# Patient Record
Sex: MALE | Race: WHITE | NOT HISPANIC OR LATINO | Employment: FULL TIME | URBAN - METROPOLITAN AREA
[De-identification: names, ages, dates, MRNs, and addresses within clinical notes are randomized per-mention and may not be internally consistent; named-entity substitution may affect disease eponyms.]

---

## 2017-01-03 ENCOUNTER — ALLSCRIPTS OFFICE VISIT (OUTPATIENT)
Dept: OTHER | Facility: OTHER | Age: 37
End: 2017-01-03

## 2017-01-09 ENCOUNTER — ANESTHESIA EVENT (OUTPATIENT)
Dept: PERIOP | Facility: HOSPITAL | Age: 37
End: 2017-01-09
Payer: COMMERCIAL

## 2017-01-09 ENCOUNTER — HOSPITAL ENCOUNTER (OUTPATIENT)
Facility: HOSPITAL | Age: 37
Setting detail: OUTPATIENT SURGERY
Discharge: HOME | End: 2017-01-09
Attending: SURGERY | Admitting: SURGERY
Payer: COMMERCIAL

## 2017-01-09 ENCOUNTER — ANESTHESIA (OUTPATIENT)
Dept: PERIOP | Facility: HOSPITAL | Age: 37
End: 2017-01-09
Payer: COMMERCIAL

## 2017-01-09 VITALS
BODY MASS INDEX: 34.22 KG/M2 | TEMPERATURE: 98.4 F | RESPIRATION RATE: 18 BRPM | DIASTOLIC BLOOD PRESSURE: 74 MMHG | OXYGEN SATURATION: 99 % | WEIGHT: 239 LBS | HEART RATE: 103 BPM | SYSTOLIC BLOOD PRESSURE: 120 MMHG | HEIGHT: 70 IN

## 2017-01-09 DIAGNOSIS — K80.20 CALCULUS OF GALLBLADDER WITHOUT CHOLECYSTITIS WITHOUT OBSTRUCTION: ICD-10-CM

## 2017-01-09 LAB
ALBUMIN SERPL BCP-MCNC: 4.4 G/DL (ref 3.5–5)
ALP SERPL-CCNC: 96 U/L (ref 46–116)
ALT SERPL W P-5'-P-CCNC: 50 U/L (ref 12–78)
AST SERPL W P-5'-P-CCNC: 52 U/L (ref 5–45)
BILIRUB DIRECT SERPL-MCNC: 0.09 MG/DL (ref 0–0.2)
BILIRUB SERPL-MCNC: 0.82 MG/DL (ref 0.2–1)
PROT SERPL-MCNC: 8.1 G/DL (ref 6.4–8.2)

## 2017-01-09 PROCEDURE — 88304 TISSUE EXAM BY PATHOLOGIST: CPT | Performed by: SURGERY

## 2017-01-09 PROCEDURE — 80076 HEPATIC FUNCTION PANEL: CPT | Performed by: SURGERY

## 2017-01-09 RX ORDER — FENTANYL CITRATE 50 UG/ML
INJECTION, SOLUTION INTRAMUSCULAR; INTRAVENOUS AS NEEDED
Status: DISCONTINUED | OUTPATIENT
Start: 2017-01-09 | End: 2017-01-09 | Stop reason: SURG

## 2017-01-09 RX ORDER — LIDOCAINE HYDROCHLORIDE 20 MG/ML
INJECTION, SOLUTION EPIDURAL; INFILTRATION; INTRACAUDAL; PERINEURAL AS NEEDED
Status: DISCONTINUED | OUTPATIENT
Start: 2017-01-09 | End: 2017-01-09 | Stop reason: SURG

## 2017-01-09 RX ORDER — ROCURONIUM BROMIDE 10 MG/ML
INJECTION, SOLUTION INTRAVENOUS AS NEEDED
Status: DISCONTINUED | OUTPATIENT
Start: 2017-01-09 | End: 2017-01-09 | Stop reason: SURG

## 2017-01-09 RX ORDER — MORPHINE SULFATE 2 MG/ML
2 INJECTION, SOLUTION INTRAMUSCULAR; INTRAVENOUS
Status: DISCONTINUED | OUTPATIENT
Start: 2017-01-09 | End: 2017-01-09 | Stop reason: HOSPADM

## 2017-01-09 RX ORDER — PROPOFOL 10 MG/ML
INJECTION, EMULSION INTRAVENOUS AS NEEDED
Status: DISCONTINUED | OUTPATIENT
Start: 2017-01-09 | End: 2017-01-09 | Stop reason: SURG

## 2017-01-09 RX ORDER — SODIUM CHLORIDE, SODIUM LACTATE, POTASSIUM CHLORIDE, CALCIUM CHLORIDE 600; 310; 30; 20 MG/100ML; MG/100ML; MG/100ML; MG/100ML
125 INJECTION, SOLUTION INTRAVENOUS CONTINUOUS
Status: DISCONTINUED | OUTPATIENT
Start: 2017-01-09 | End: 2017-01-09 | Stop reason: HOSPADM

## 2017-01-09 RX ORDER — MEPERIDINE HYDROCHLORIDE 50 MG/ML
12.5 INJECTION INTRAMUSCULAR; INTRAVENOUS; SUBCUTANEOUS AS NEEDED
Status: CANCELLED | OUTPATIENT
Start: 2017-01-09

## 2017-01-09 RX ORDER — MAGNESIUM HYDROXIDE 1200 MG/15ML
LIQUID ORAL AS NEEDED
Status: DISCONTINUED | OUTPATIENT
Start: 2017-01-09 | End: 2017-01-09 | Stop reason: HOSPADM

## 2017-01-09 RX ORDER — KETOROLAC TROMETHAMINE 30 MG/ML
INJECTION, SOLUTION INTRAMUSCULAR; INTRAVENOUS AS NEEDED
Status: DISCONTINUED | OUTPATIENT
Start: 2017-01-09 | End: 2017-01-09 | Stop reason: SURG

## 2017-01-09 RX ORDER — MIDAZOLAM HYDROCHLORIDE 1 MG/ML
INJECTION INTRAMUSCULAR; INTRAVENOUS AS NEEDED
Status: DISCONTINUED | OUTPATIENT
Start: 2017-01-09 | End: 2017-01-09 | Stop reason: SURG

## 2017-01-09 RX ORDER — FENTANYL CITRATE/PF 50 MCG/ML
50 SYRINGE (ML) INJECTION
Status: DISCONTINUED | OUTPATIENT
Start: 2017-01-09 | End: 2017-01-09 | Stop reason: HOSPADM

## 2017-01-09 RX ORDER — BUPIVACAINE HYDROCHLORIDE AND EPINEPHRINE 2.5; 5 MG/ML; UG/ML
INJECTION, SOLUTION EPIDURAL; INFILTRATION; INTRACAUDAL; PERINEURAL AS NEEDED
Status: DISCONTINUED | OUTPATIENT
Start: 2017-01-09 | End: 2017-01-09 | Stop reason: HOSPADM

## 2017-01-09 RX ORDER — SODIUM CHLORIDE 9 MG/ML
125 INJECTION, SOLUTION INTRAVENOUS CONTINUOUS
Status: DISCONTINUED | OUTPATIENT
Start: 2017-01-09 | End: 2017-01-09 | Stop reason: HOSPADM

## 2017-01-09 RX ORDER — FENTANYL CITRATE/PF 50 MCG/ML
50 SYRINGE (ML) INJECTION
Status: CANCELLED | OUTPATIENT
Start: 2017-01-09

## 2017-01-09 RX ORDER — HYDROCODONE BITARTRATE AND ACETAMINOPHEN 5; 325 MG/1; MG/1
2 TABLET ORAL EVERY 6 HOURS PRN
Status: DISCONTINUED | OUTPATIENT
Start: 2017-01-09 | End: 2017-01-09 | Stop reason: HOSPADM

## 2017-01-09 RX ORDER — HYDROCODONE BITARTRATE AND ACETAMINOPHEN 5; 325 MG/1; MG/1
1-2 TABLET ORAL EVERY 6 HOURS PRN
Qty: 40 TABLET | Refills: 0 | Status: SHIPPED | OUTPATIENT
Start: 2017-01-09 | End: 2017-01-19

## 2017-01-09 RX ORDER — ONDANSETRON 2 MG/ML
INJECTION INTRAMUSCULAR; INTRAVENOUS AS NEEDED
Status: DISCONTINUED | OUTPATIENT
Start: 2017-01-09 | End: 2017-01-09 | Stop reason: SURG

## 2017-01-09 RX ORDER — MEPERIDINE HYDROCHLORIDE 50 MG/ML
12.5 INJECTION INTRAMUSCULAR; INTRAVENOUS; SUBCUTANEOUS AS NEEDED
Status: DISCONTINUED | OUTPATIENT
Start: 2017-01-09 | End: 2017-01-09 | Stop reason: HOSPADM

## 2017-01-09 RX ORDER — GLYCOPYRROLATE 0.2 MG/ML
INJECTION INTRAMUSCULAR; INTRAVENOUS AS NEEDED
Status: DISCONTINUED | OUTPATIENT
Start: 2017-01-09 | End: 2017-01-09 | Stop reason: SURG

## 2017-01-09 RX ORDER — HYDROCODONE BITARTRATE AND ACETAMINOPHEN 5; 325 MG/1; MG/1
1 TABLET ORAL EVERY 4 HOURS PRN
Status: DISCONTINUED | OUTPATIENT
Start: 2017-01-09 | End: 2017-01-09 | Stop reason: HOSPADM

## 2017-01-09 RX ADMIN — FENTANYL CITRATE 50 MCG: 50 INJECTION, SOLUTION INTRAMUSCULAR; INTRAVENOUS at 09:10

## 2017-01-09 RX ADMIN — HYDROCODONE BITARTRATE AND ACETAMINOPHEN 1 TABLET: 5; 325 TABLET ORAL at 13:37

## 2017-01-09 RX ADMIN — LIDOCAINE HYDROCHLORIDE 100 MG: 20 INJECTION, SOLUTION EPIDURAL; INFILTRATION; INTRACAUDAL; PERINEURAL at 07:36

## 2017-01-09 RX ADMIN — SODIUM CHLORIDE 125 ML/HR: 0.9 INJECTION, SOLUTION INTRAVENOUS at 12:15

## 2017-01-09 RX ADMIN — FENTANYL CITRATE 50 MCG: 50 INJECTION, SOLUTION INTRAMUSCULAR; INTRAVENOUS at 08:34

## 2017-01-09 RX ADMIN — PROPOFOL 200 MG: 10 INJECTION, EMULSION INTRAVENOUS at 07:36

## 2017-01-09 RX ADMIN — METRONIDAZOLE 500 MG: 500 INJECTION, SOLUTION INTRAVENOUS at 07:46

## 2017-01-09 RX ADMIN — MIDAZOLAM HYDROCHLORIDE 2 MG: 1 INJECTION, SOLUTION INTRAMUSCULAR; INTRAVENOUS at 07:27

## 2017-01-09 RX ADMIN — DEXAMETHASONE SODIUM PHOSPHATE 4 MG: 10 INJECTION INTRAMUSCULAR; INTRAVENOUS at 07:50

## 2017-01-09 RX ADMIN — KETOROLAC TROMETHAMINE 30 MG: 30 INJECTION, SOLUTION INTRAMUSCULAR at 08:50

## 2017-01-09 RX ADMIN — CEFAZOLIN SODIUM 2000 MG: 2 SOLUTION INTRAVENOUS at 07:41

## 2017-01-09 RX ADMIN — ONDANSETRON HYDROCHLORIDE 4 MG: 2 INJECTION, SOLUTION INTRAVENOUS at 07:50

## 2017-01-09 RX ADMIN — HYDROCODONE BITARTRATE AND ACETAMINOPHEN 1 TABLET: 5; 325 TABLET ORAL at 14:13

## 2017-01-09 RX ADMIN — ROCURONIUM BROMIDE 40 MG: 10 INJECTION, SOLUTION INTRAVENOUS at 07:36

## 2017-01-09 RX ADMIN — NEOSTIGMINE METHYLSULFATE 2.5 MG: 1 INJECTION INTRAMUSCULAR; INTRAVENOUS; SUBCUTANEOUS at 08:51

## 2017-01-09 RX ADMIN — FENTANYL CITRATE 150 MCG: 50 INJECTION, SOLUTION INTRAMUSCULAR; INTRAVENOUS at 07:36

## 2017-01-09 RX ADMIN — SODIUM CHLORIDE 125 ML/HR: 0.9 INJECTION, SOLUTION INTRAVENOUS at 06:43

## 2017-01-09 RX ADMIN — FENTANYL CITRATE 100 MCG: 50 INJECTION, SOLUTION INTRAMUSCULAR; INTRAVENOUS at 07:28

## 2017-01-09 RX ADMIN — GLYCOPYRROLATE 0.4 MG: 0.2 INJECTION, SOLUTION INTRAMUSCULAR; INTRAVENOUS at 08:51

## 2017-01-24 ENCOUNTER — ALLSCRIPTS OFFICE VISIT (OUTPATIENT)
Dept: OTHER | Facility: OTHER | Age: 37
End: 2017-01-24

## 2017-05-31 ENCOUNTER — ALLSCRIPTS OFFICE VISIT (OUTPATIENT)
Dept: OTHER | Facility: OTHER | Age: 37
End: 2017-05-31

## 2018-01-12 VITALS
DIASTOLIC BLOOD PRESSURE: 76 MMHG | HEART RATE: 76 BPM | TEMPERATURE: 98.3 F | BODY MASS INDEX: 34.22 KG/M2 | HEIGHT: 70 IN | SYSTOLIC BLOOD PRESSURE: 120 MMHG | RESPIRATION RATE: 16 BRPM | WEIGHT: 239.04 LBS

## 2018-01-12 VITALS
BODY MASS INDEX: 33.79 KG/M2 | HEIGHT: 70 IN | HEART RATE: 76 BPM | SYSTOLIC BLOOD PRESSURE: 120 MMHG | RESPIRATION RATE: 16 BRPM | DIASTOLIC BLOOD PRESSURE: 76 MMHG | WEIGHT: 236 LBS | TEMPERATURE: 97.5 F

## 2018-01-12 VITALS
SYSTOLIC BLOOD PRESSURE: 118 MMHG | RESPIRATION RATE: 18 BRPM | BODY MASS INDEX: 32.69 KG/M2 | DIASTOLIC BLOOD PRESSURE: 84 MMHG | HEART RATE: 72 BPM | WEIGHT: 229.8 LBS

## 2018-03-09 ENCOUNTER — OFFICE VISIT (OUTPATIENT)
Dept: FAMILY MEDICINE CLINIC | Facility: CLINIC | Age: 38
End: 2018-03-09
Payer: COMMERCIAL

## 2018-03-09 VITALS
BODY MASS INDEX: 30.78 KG/M2 | HEIGHT: 71 IN | RESPIRATION RATE: 18 BRPM | SYSTOLIC BLOOD PRESSURE: 120 MMHG | DIASTOLIC BLOOD PRESSURE: 86 MMHG | WEIGHT: 219.9 LBS | HEART RATE: 72 BPM

## 2018-03-09 DIAGNOSIS — R07.9 CHEST PAIN, UNSPECIFIED TYPE: Primary | ICD-10-CM

## 2018-03-09 PROCEDURE — 93000 ELECTROCARDIOGRAM COMPLETE: CPT | Performed by: PHYSICIAN ASSISTANT

## 2018-03-09 PROCEDURE — 99214 OFFICE O/P EST MOD 30 MIN: CPT | Performed by: PHYSICIAN ASSISTANT

## 2018-03-09 NOTE — PROGRESS NOTES
Assessment/Plan:    1  Chest pain, unspecified type    - EKG normal, will order CXR, get labs done  Most likely muscular in nature  Will call with results  - POCT ECG - NSR no change from previous  - Lipid Panel with Direct LDL reflex; Future  - Comprehensive metabolic panel; Future  - CBC and differential; Future  - TSH, 3rd generation with T4 reflex; Future  - XR chest pa & lateral; Future      F/u as needed  F/u physical at age 34      Subjective:   Chief Complaint   Patient presents with    Chest Pain     since mid January occuring more frequently      Patient ID: Scar Ojeda II is a 42 y o  male  Patient had a history of atypical chest pain, would come and go and then had cholecystectomy and symptoms resolved for over a year  Then January they returned  Happens every 3rd day, now happens daily at least once sometimes twice  Feels like a pressing feeling dull pain, lasts about 5-10 minutes  No relationship to food, activity  Can happen at rest, home, work  Random  Has recently taken up volleyball and has no pain with playing and no pain after, no pain after eating like before  Denies shortness of breath, radiation, cough  Tried Prilosec for two weeks with no change at all  Has lost 40 lbs in past two years by watching portion size and playing volleyball  Dad smoker in house, patient never smoked  Dad had multiple MI, paternal grandfather MI          The following portions of the patient's history were reviewed and updated as appropriate: allergies, current medications, past family history, past medical history, past social history, past surgical history and problem list     Past Medical History:   Diagnosis Date    Cholelithiasis     GERD (gastroesophageal reflux disease)     Herniation of intervertebral disc of lumbar spine     IBS (irritable bowel syndrome)      Past Surgical History:   Procedure Laterality Date    COLONOSCOPY      [place] LAP,CHOLECYSTECTOMY N/A 1/9/2017 Procedure: CHOLECYSTECTOMY LAPAROSCOPIC;  Surgeon: Tiffani Graham MD;  Location: [facility] OR;  Service: General    WISDOM TOOTH EXTRACTION       Family History   Problem Relation Age of Onset    Nephrolithiasis Mother     Heart attack Father     Coronary artery disease Maternal Grandfather      Social History     Social History    Marital status: Single     Spouse name: N/A    Number of children: N/A    Years of education: N/A     Occupational History    Not on file  Social History Main Topics    Smoking status: Never Smoker    Smokeless tobacco: Never Used    Alcohol use No    Drug use: No    Sexual activity: Not on file     Other Topics Concern    Not on file     Social History Narrative    No narrative on file     No current outpatient prescriptions on file  Review of Systems          Objective:    Vitals:    03/09/18 1303   BP: 120/86   BP Location: Left arm   Patient Position: Sitting   Cuff Size: Adult   Pulse: 72   Resp: 18   Weight: 99 7 kg (219 lb 14 4 oz)   Height: 5' 10 5" (1 791 m)        Physical Exam   Constitutional: He is oriented to person, place, and time  He appears well-developed and well-nourished  Neck: Neck supple  Cardiovascular: Normal rate, regular rhythm and normal heart sounds  Pulmonary/Chest: Effort normal and breath sounds normal    Chest wall nontender in perceived areas   Neurological: He is alert and oriented to person, place, and time  Skin: Skin is warm  Psychiatric: He has a normal mood and affect

## 2018-03-12 ENCOUNTER — LAB (OUTPATIENT)
Dept: LAB | Facility: CLINIC | Age: 38
End: 2018-03-12
Payer: COMMERCIAL

## 2018-03-12 DIAGNOSIS — R07.9 CHEST PAIN, UNSPECIFIED TYPE: ICD-10-CM

## 2018-03-12 LAB
ALBUMIN SERPL BCP-MCNC: 4.3 G/DL (ref 3.5–5)
ALP SERPL-CCNC: 99 U/L (ref 46–116)
ALT SERPL W P-5'-P-CCNC: 66 U/L (ref 12–78)
ANION GAP SERPL CALCULATED.3IONS-SCNC: 8 MMOL/L (ref 4–13)
AST SERPL W P-5'-P-CCNC: 24 U/L (ref 5–45)
BASOPHILS # BLD AUTO: 0.03 THOUSANDS/ΜL (ref 0–0.1)
BASOPHILS NFR BLD AUTO: 1 % (ref 0–1)
BILIRUB SERPL-MCNC: 1.2 MG/DL (ref 0.2–1)
BUN SERPL-MCNC: 13 MG/DL (ref 5–25)
CALCIUM SERPL-MCNC: 8.9 MG/DL (ref 8.3–10.1)
CHLORIDE SERPL-SCNC: 104 MMOL/L (ref 100–108)
CHOLEST SERPL-MCNC: 181 MG/DL (ref 50–200)
CO2 SERPL-SCNC: 28 MMOL/L (ref 21–32)
CREAT SERPL-MCNC: 1.07 MG/DL (ref 0.6–1.3)
EOSINOPHIL # BLD AUTO: 0.09 THOUSAND/ΜL (ref 0–0.61)
EOSINOPHIL NFR BLD AUTO: 2 % (ref 0–6)
ERYTHROCYTE [DISTWIDTH] IN BLOOD BY AUTOMATED COUNT: 13.5 % (ref 11.6–15.1)
GFR SERPL CREATININE-BSD FRML MDRD: 87 ML/MIN/1.73SQ M
GLUCOSE P FAST SERPL-MCNC: 85 MG/DL (ref 65–99)
HCT VFR BLD AUTO: 45.8 % (ref 36.5–49.3)
HDLC SERPL-MCNC: 47 MG/DL (ref 40–60)
HGB BLD-MCNC: 15.9 G/DL (ref 12–17)
LDLC SERPL CALC-MCNC: 115 MG/DL (ref 0–100)
LYMPHOCYTES # BLD AUTO: 1.52 THOUSANDS/ΜL (ref 0.6–4.47)
LYMPHOCYTES NFR BLD AUTO: 26 % (ref 14–44)
MCH RBC QN AUTO: 29.8 PG (ref 26.8–34.3)
MCHC RBC AUTO-ENTMCNC: 34.7 G/DL (ref 31.4–37.4)
MCV RBC AUTO: 86 FL (ref 82–98)
MONOCYTES # BLD AUTO: 0.43 THOUSAND/ΜL (ref 0.17–1.22)
MONOCYTES NFR BLD AUTO: 7 % (ref 4–12)
NEUTROPHILS # BLD AUTO: 3.7 THOUSANDS/ΜL (ref 1.85–7.62)
NEUTS SEG NFR BLD AUTO: 64 % (ref 43–75)
NRBC BLD AUTO-RTO: 0 /100 WBCS
PLATELET # BLD AUTO: 290 THOUSANDS/UL (ref 149–390)
PMV BLD AUTO: 10.9 FL (ref 8.9–12.7)
POTASSIUM SERPL-SCNC: 4.4 MMOL/L (ref 3.5–5.3)
PROT SERPL-MCNC: 7.5 G/DL (ref 6.4–8.2)
RBC # BLD AUTO: 5.33 MILLION/UL (ref 3.88–5.62)
SODIUM SERPL-SCNC: 140 MMOL/L (ref 136–145)
TRIGL SERPL-MCNC: 97 MG/DL
TSH SERPL DL<=0.05 MIU/L-ACNC: 3.35 UIU/ML (ref 0.36–3.74)
WBC # BLD AUTO: 5.78 THOUSAND/UL (ref 4.31–10.16)

## 2018-03-12 PROCEDURE — 84443 ASSAY THYROID STIM HORMONE: CPT

## 2018-03-12 PROCEDURE — 80061 LIPID PANEL: CPT

## 2018-03-12 PROCEDURE — 80053 COMPREHEN METABOLIC PANEL: CPT

## 2018-03-12 PROCEDURE — 36415 COLL VENOUS BLD VENIPUNCTURE: CPT

## 2018-03-12 PROCEDURE — 85025 COMPLETE CBC W/AUTO DIFF WBC: CPT

## 2018-03-14 ENCOUNTER — APPOINTMENT (OUTPATIENT)
Dept: RADIOLOGY | Facility: CLINIC | Age: 38
End: 2018-03-14
Payer: COMMERCIAL

## 2018-03-14 DIAGNOSIS — R07.9 CHEST PAIN, UNSPECIFIED TYPE: ICD-10-CM

## 2018-03-14 PROCEDURE — 71046 X-RAY EXAM CHEST 2 VIEWS: CPT

## 2018-08-22 ENCOUNTER — TRANSCRIBE ORDERS (OUTPATIENT)
Dept: ADMINISTRATIVE | Facility: HOSPITAL | Age: 38
End: 2018-08-22

## 2018-08-22 ENCOUNTER — OFFICE VISIT (OUTPATIENT)
Dept: CARDIOLOGY CLINIC | Facility: CLINIC | Age: 38
End: 2018-08-22
Payer: COMMERCIAL

## 2018-08-22 VITALS
DIASTOLIC BLOOD PRESSURE: 86 MMHG | HEART RATE: 86 BPM | SYSTOLIC BLOOD PRESSURE: 130 MMHG | HEIGHT: 72 IN | WEIGHT: 223.8 LBS | BODY MASS INDEX: 30.31 KG/M2

## 2018-08-22 DIAGNOSIS — R07.9 CHEST PAIN, UNSPECIFIED TYPE: Primary | ICD-10-CM

## 2018-08-22 PROCEDURE — 93000 ELECTROCARDIOGRAM COMPLETE: CPT | Performed by: INTERNAL MEDICINE

## 2018-08-22 PROCEDURE — 99243 OFF/OP CNSLTJ NEW/EST LOW 30: CPT | Performed by: INTERNAL MEDICINE

## 2018-08-22 NOTE — PROGRESS NOTES
Cardiology Consultation     Moni Mccord  [id number]  1980  HEART & VASCULAR  [facility] Emperatriz Arredondo  [address] [place] [address]  1  Chest pain, unspecified type       Patient Active Problem List   Diagnosis    Acid reflux    Irritable bowel syndrome       HPI patient is here for a cardiology evaluation  He has been having issues with dull chest discomfort  There is a family history of coronary disease  His father has had 2 heart attacks and  after his third heart attack at the age of 71  He had also had a stroke  His paternal grandfather had a heart attack and  after his second at the age of 67  Megha Madden His father was a remote tobacco user  Patient had a lipid profile in March which demonstrated a total cholesterol of 181 with an HDL of 47 and an LDL of 115  The patient's EKG today demonstrates sinus rhythm and is normal   His blood pressure is 130/86 with a pulse of 86  Patient has been having intermittent left upper pectoral discomfort  It occurs randomly  It occurs on a daily basis  It is not related to anything in particular  He is here for device in reference to further management  He does recall having a cardiac workup about three years ago and eventually was found to have gallbladder disease but was not having this type of discomfort at that time  PMH-  Past Medical History:   Diagnosis Date    Cholelithiasis     GERD (gastroesophageal reflux disease)     Herniation of intervertebral disc of lumbar spine     IBS (irritable bowel syndrome)         SOCIAL HISTORY-  Social History     Social History    Marital status: Single     Spouse name: N/A    Number of children: N/A    Years of education: N/A     Occupational History    Not on file       Social History Main Topics    Smoking status: Never Smoker    Smokeless tobacco: Never Used    Alcohol use No    Drug use: No    Sexual activity: Not on file Other Topics Concern    Not on file     Social History Narrative    No narrative on file        FAMILY HISTORY-  Family History   Problem Relation Age of Onset    Nephrolithiasis Mother     Heart attack Father     Coronary artery disease Maternal Grandfather        SURGICAL HISTORY-  Past Surgical History:   Procedure Laterality Date    COLONOSCOPY      [place] LAP,CHOLECYSTECTOMY N/A 1/9/2017    Procedure: CHOLECYSTECTOMY LAPAROSCOPIC;  Surgeon: Deandra Crocker MD;  Location: [facility] OR;  Service: General    WISDOM TOOTH EXTRACTION         No current outpatient prescriptions on file  Allergies   Allergen Reactions    Other      Annotation - [id number]: poison ivy/oak     Vitals:    08/22/18 1431   Weight: 102 kg (223 lb 12 8 oz)   Height: 6' (1 829 m)         Review of Systems:  Review of Systems   Cardiovascular: Positive for chest pain  All other systems reviewed and are negative  Physical Exam:  Physical Exam   Constitutional: He is oriented to person, place, and time  He appears well-developed and well-nourished  HENT:   Head: Normocephalic and atraumatic  Eyes: Conjunctivae are normal  Pupils are equal, round, and reactive to light  Neck: Normal range of motion  Neck supple  Cardiovascular: Normal rate and normal heart sounds  Pulmonary/Chest: Effort normal and breath sounds normal    Neurological: He is alert and oriented to person, place, and time  Skin: Skin is warm and dry  Psychiatric: He has a normal mood and affect  Vitals reviewed  Discussion/Summary:  I will schedule a standard treadmill test and an echocardiogram   I have asked the patient to call in the interim if there is a problem otherwise I will see him in follow-up in six months time

## 2018-08-22 NOTE — PATIENT INSTRUCTIONS
I will check a stress test and echocardiogram   Please call if there is a problem in the interim otherwise I will see you at the follow-up visit

## 2018-12-05 ENCOUNTER — HOSPITAL ENCOUNTER (OUTPATIENT)
Dept: NON INVASIVE DIAGNOSTICS | Facility: CLINIC | Age: 38
Discharge: HOME | End: 2018-12-05
Payer: COMMERCIAL

## 2018-12-05 DIAGNOSIS — R07.9 CHEST PAIN, UNSPECIFIED TYPE: ICD-10-CM

## 2018-12-05 PROCEDURE — 93306 TTE W/DOPPLER COMPLETE: CPT

## 2018-12-05 PROCEDURE — 93306 TTE W/DOPPLER COMPLETE: CPT | Performed by: INTERNAL MEDICINE

## 2018-12-05 PROCEDURE — 93018 CV STRESS TEST I&R ONLY: CPT | Performed by: INTERNAL MEDICINE

## 2018-12-05 PROCEDURE — 93016 CV STRESS TEST SUPVJ ONLY: CPT | Performed by: INTERNAL MEDICINE

## 2018-12-05 PROCEDURE — 93017 CV STRESS TEST TRACING ONLY: CPT

## 2018-12-13 LAB
CHEST PAIN STATEMENT: NORMAL
MAX DIASTOLIC BP: 80 MMHG
MAX HEART RATE: 184 BPM
MAX PREDICTED HEART RATE: 180 BPM
MAX. SYSTOLIC BP: 190 MMHG
PROTOCOL NAME: NORMAL
REASON FOR TERMINATION: NORMAL
TARGET HR FORMULA: NORMAL
TEST INDICATION: NORMAL
TIME IN EXERCISE PHASE: NORMAL

## 2019-02-09 NOTE — PROGRESS NOTES
Cardiology Follow Up    Garett Letters  1980  [id number]  [address] [facility] [place] [address]  [phone]    1  Chest pain, unspecified type         Interval History:  Patient is here for a follow-up visit  He was most recently seen by me in August of last year  Since that visit with me he had an exercise treadmill test done December 6th which showed no evidence of prognostically important ischemia  He exercised for 10 minutes and 35 seconds  An echocardiogram demonstrated preserved LV systolic function and no significant valvular heart disease  He had a family history of coronary disease and at that time was having atypical chest discomfort  He has been fine  He has had no chest pain or significant dyspnea      Patient Active Problem List   Diagnosis    Acid reflux    Irritable bowel syndrome     Past Medical History:   Diagnosis Date    Cholelithiasis     GERD (gastroesophageal reflux disease)     Herniation of intervertebral disc of lumbar spine     IBS (irritable bowel syndrome)      Social History     Socioeconomic History    Marital status: Single     Spouse name: Not on file    Number of children: Not on file    Years of education: Not on file    Highest education level: Not on file   Occupational History    Not on file   Social Needs    Financial resource strain: Not on file    Food insecurity:     Worry: Not on file     Inability: Not on file    Transportation needs:     Medical: Not on file     Non-medical: Not on file   Tobacco Use    Smoking status: Never Smoker    Smokeless tobacco: Never Used   Substance and Sexual Activity    Alcohol use: No    Drug use: No    Sexual activity: Not on file   Lifestyle    Physical activity:     Days per week: Not on file     Minutes per session: Not on file    Stress: Not on file   Relationships    Social connections: Talks on phone: Not on file     Gets together: Not on file     Attends [employer] service: Not on file     Active member of club or organization: Not on file     Attends meetings of clubs or organizations: Not on file     Relationship status: Not on file    Intimate partner violence:     Fear of current or ex partner: Not on file     Emotionally abused: Not on file     Physically abused: Not on file     Forced sexual activity: Not on file   Other Topics Concern    Not on file   Social History Narrative    Not on file      Family History   Problem Relation Age of Onset    Nephrolithiasis Mother     Arthritis Mother     Hyperlipidemia Mother     Heart attack Father     Hypertension Father     Stroke Father     Sudden death Father     Hyperlipidemia Father     Coronary artery disease Maternal Grandfather      Past Surgical History:   Procedure Laterality Date    COLONOSCOPY      [place] LAP,CHOLECYSTECTOMY N/A 1/9/2017    Procedure: CHOLECYSTECTOMY LAPAROSCOPIC;  Surgeon: Farhan Walker MD;  [facility];  Service: General    WISDOM TOOTH EXTRACTION       No current outpatient medications on file  Allergies   Allergen Reactions    Other      Annotation - [id number]: poison ivy/oak       Labs:not applicable  Imaging: No results found  Review of Systems:  Review of Systems   All other systems reviewed and are negative  Physical Exam:  /82 (BP Location: Left arm, Patient Position: Sitting, Cuff Size: Large)   Pulse 84   Ht 6' (1 829 m)   Wt 106 kg (234 lb)   BMI 31 74 kg/m²   Physical Exam   Constitutional: He is oriented to person, place, and time  He appears well-developed and well-nourished  HENT:   Head: Normocephalic and atraumatic  Eyes: Pupils are equal, round, and reactive to light  Conjunctivae are normal    Neck: Normal range of motion  Neck supple  Cardiovascular: Normal rate and normal heart sounds     Pulmonary/Chest: Effort normal and breath sounds normal  Neurological: He is alert and oriented to person, place, and time  Skin: Skin is warm and dry  Psychiatric: He has a normal mood and affect  Vitals reviewed  Discussion/Summary:I will continue the patient's present medical regimen  Patient appears well compensated  I have asked the patient to call if there is a problem in the interim otherwise I will see the patient in one years time

## 2019-02-13 ENCOUNTER — OFFICE VISIT (OUTPATIENT)
Dept: CARDIOLOGY CLINIC | Facility: CLINIC | Age: 39
End: 2019-02-13
Payer: COMMERCIAL

## 2019-02-13 VITALS
SYSTOLIC BLOOD PRESSURE: 122 MMHG | DIASTOLIC BLOOD PRESSURE: 82 MMHG | WEIGHT: 234 LBS | HEART RATE: 84 BPM | HEIGHT: 72 IN | BODY MASS INDEX: 31.69 KG/M2

## 2019-02-13 DIAGNOSIS — R07.9 CHEST PAIN, UNSPECIFIED TYPE: Primary | ICD-10-CM

## 2019-02-13 PROCEDURE — 99214 OFFICE O/P EST MOD 30 MIN: CPT | Performed by: INTERNAL MEDICINE

## 2019-03-25 ENCOUNTER — OFFICE VISIT (OUTPATIENT)
Dept: FAMILY MEDICINE CLINIC | Facility: CLINIC | Age: 39
End: 2019-03-25
Payer: COMMERCIAL

## 2019-03-25 VITALS
HEART RATE: 78 BPM | DIASTOLIC BLOOD PRESSURE: 82 MMHG | SYSTOLIC BLOOD PRESSURE: 120 MMHG | RESPIRATION RATE: 14 BRPM | HEIGHT: 72 IN | WEIGHT: 234 LBS | BODY MASS INDEX: 31.69 KG/M2
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
## 2019-03-25 DIAGNOSIS — S70.361A TICK BITE OF THIGH, RIGHT, INITIAL ENCOUNTER: Primary | ICD-10-CM

## 2019-03-25 DIAGNOSIS — R07.89 OTHER CHEST PAIN: ICD-10-CM

## 2019-03-25 PROBLEM — S80.862A TICK BITE OF LEFT LOWER LEG: Status: ACTIVE | Noted: 2019-03-25

## 2019-03-25 PROCEDURE — 99214 OFFICE O/P EST MOD 30 MIN: CPT | Performed by: PHYSICIAN ASSISTANT

## 2019-03-25 PROCEDURE — 3008F BODY MASS INDEX DOCD: CPT | Performed by: PHYSICIAN ASSISTANT

## 2019-03-25 RX ORDER — DOXYCYCLINE HYCLATE 100 MG/1
100 CAPSULE ORAL EVERY 12 HOURS SCHEDULED
Qty: 2 CAPSULE | Refills: 0 | Status: SHIPPED | OUTPATIENT
Start: 2019-03-25 | End: 2019-03-26

## 2019-03-25 NOTE — PROGRESS NOTES
Assessment/Plan:    1  Tick bite of thigh, right, initial encounter    - tick removed by patient, wound care to area covered with bandaid, doxy 200 mg now with food then check titers in 1 month  - doxycycline hyclate (VIBRAMYCIN) 100 mg capsule; Take 1 capsule (100 mg total) by mouth every 12 (twelve) hours for 1 day  Dispense: 2 capsule; Refill: 0  - Lyme Antibody Profile with reflex to WB; Future    2  Other chest pain    - cleared by cardiology, will refer to GI  - Ambulatory referral to Gastroenterology; Future    F/u as needed    Subjective:   Chief Complaint   Patient presents with    embedded tick      Patient ID: Kin Arellano is a 34 y o  male  Patient here noted a tick on leg, tried to remove him and only got half out  Looking to get rid of rest of it  Also was cleared by cardiology but still gets random chest pain, squeezing, comes and goes  Sometimes related to food, has tried omeprazole in the past, had gallbladder removed but still with chest pain  Wondering next step "before volleyball season begins"  Denies cough, wheeze, sob, sob with exertion         The following portions of the patient's history were reviewed and updated as appropriate: allergies, current medications, past family history, past medical history, past social history, past surgical history and problem list     Past Medical History:   Diagnosis Date    Cholelithiasis     GERD (gastroesophageal reflux disease)     Herniation of intervertebral disc of lumbar spine     IBS (irritable bowel syndrome)      Past Surgical History:   Procedure Laterality Date    COLONOSCOPY      [place] LAP,CHOLECYSTECTOMY N/A 1/9/2017    Procedure: CHOLECYSTECTOMY LAPAROSCOPIC;  Surgeon: Tara Duong MD;  [facility];  Service: General    WISDOM TOOTH EXTRACTION       Family History   Problem Relation Age of Onset    Nephrolithiasis Mother    Bend Birgit Arthritis Mother     Hyperlipidemia Mother     Heart attack Father     Hypertension Father     Stroke Father     Sudden death Father     Hyperlipidemia Father     Coronary artery disease Maternal Grandfather      Social History     Socioeconomic History    Marital status: Single     Spouse name: Not on file    Number of children: Not on file    Years of education: Not on file    Highest education level: Not on file   Occupational History    Not on file   Social Needs    Financial resource strain: Not on file    Food insecurity:     Worry: Not on file     Inability: Not on file    Transportation needs:     Medical: Not on file     Non-medical: Not on file   Tobacco Use    Smoking status: Never Smoker    Smokeless tobacco: Never Used   Substance and Sexual Activity    Alcohol use: No    Drug use: No    Sexual activity: Not on file   Lifestyle    Physical activity:     Days per week: Not on file     Minutes per session: Not on file    Stress: Not on file   Relationships    Social connections:     Talks on phone: Not on file     Gets together: Not on file     Attends [employer] service: Not on file     Active member of club or organization: Not on file     Attends meetings of clubs or organizations: Not on file     Relationship status: Not on file    Intimate partner violence:     Fear of current or ex partner: Not on file     Emotionally abused: Not on file     Physically abused: Not on file     Forced sexual activity: Not on file   Other Topics Concern    Not on file   Social History Narrative    Not on file     No current outpatient medications on file  Review of Systems          Objective:    Vitals:    03/25/19 1217   BP: 120/82   BP Location: Left arm   Patient Position: Sitting   Pulse: 78   Resp: 14   Weight: 106 kg (234 lb)   Height: 6' (1 829 m)        Physical Exam   Constitutional: He is oriented to person, place, and time  He appears well-developed and well-nourished  Neck: Neck supple     Cardiovascular: Normal rate, regular rhythm, normal heart sounds and intact distal pulses  Pulmonary/Chest: Effort normal and breath sounds normal    Musculoskeletal: He exhibits no edema  Neurological: He is alert and oriented to person, place, and time  Skin: Skin is warm  Right upper inner thigh with excoriated 2 mm lesion, no obvious tick remainders   Psychiatric: He has a normal mood and affect

## 2019-04-02 ENCOUNTER — CONSULT (OUTPATIENT)
Dept: GASTROENTEROLOGY | Facility: CLINIC | Age: 39
End: 2019-04-02
Payer: COMMERCIAL

## 2019-04-02 VITALS
HEART RATE: 73 BPM | SYSTOLIC BLOOD PRESSURE: 135 MMHG | TEMPERATURE: 97.7 F | HEIGHT: 71 IN | BODY MASS INDEX: 33.1 KG/M2 | WEIGHT: 236.4 LBS | DIASTOLIC BLOOD PRESSURE: 90 MMHG

## 2019-04-02 DIAGNOSIS — K58.0 IRRITABLE BOWEL SYNDROME WITH DIARRHEA: ICD-10-CM

## 2019-04-02 DIAGNOSIS — R07.89 OTHER CHEST PAIN: ICD-10-CM

## 2019-04-02 DIAGNOSIS — K21.9 GASTROESOPHAGEAL REFLUX DISEASE, ESOPHAGITIS PRESENCE NOT SPECIFIED: Primary | ICD-10-CM

## 2019-04-02 PROCEDURE — 99244 OFF/OP CNSLTJ NEW/EST MOD 40: CPT | Performed by: INTERNAL MEDICINE

## 2019-04-22 ENCOUNTER — ANESTHESIA EVENT (OUTPATIENT)
Dept: PERIOP | Facility: AMBULARY SURGERY CENTER | Age: 39
End: 2019-04-22
Payer: COMMERCIAL

## 2019-04-29 LAB
B BURGDOR AB SER IA-ACNC: <0.9 INDEX
IGA SERPL-MCNC: 219 MG/DL (ref 81–463)
TTG IGA SER-ACNC: 1 U/ML

## 2019-04-30 ENCOUNTER — TELEPHONE (OUTPATIENT)
Dept: FAMILY MEDICINE CLINIC | Facility: CLINIC | Age: 39
End: 2019-04-30

## 2019-05-02 ENCOUNTER — ANESTHESIA (OUTPATIENT)
Dept: PERIOP | Facility: AMBULARY SURGERY CENTER | Age: 39
End: 2019-05-02
Payer: COMMERCIAL

## 2019-05-02 ENCOUNTER — HOSPITAL ENCOUNTER (OUTPATIENT)
Facility: AMBULARY SURGERY CENTER | Age: 39
Setting detail: OUTPATIENT SURGERY
Discharge: HOME | End: 2019-05-02
Attending: INTERNAL MEDICINE | Admitting: INTERNAL MEDICINE
Payer: COMMERCIAL

## 2019-05-02 VITALS
HEART RATE: 95 BPM | TEMPERATURE: 97.5 F | WEIGHT: 228 LBS | BODY MASS INDEX: 31.92 KG/M2 | OXYGEN SATURATION: 96 % | DIASTOLIC BLOOD PRESSURE: 85 MMHG | RESPIRATION RATE: 18 BRPM | SYSTOLIC BLOOD PRESSURE: 127 MMHG | HEIGHT: 71 IN

## 2019-05-02 DIAGNOSIS — K21.9 GASTROESOPHAGEAL REFLUX DISEASE, ESOPHAGITIS PRESENCE NOT SPECIFIED: ICD-10-CM

## 2019-05-02 DIAGNOSIS — K58.0 IRRITABLE BOWEL SYNDROME WITH DIARRHEA: ICD-10-CM

## 2019-05-02 DIAGNOSIS — R07.89 OTHER CHEST PAIN: ICD-10-CM

## 2019-05-02 PROCEDURE — NC001 PR NO CHARGE: Performed by: INTERNAL MEDICINE

## 2019-05-02 PROCEDURE — 88305 TISSUE EXAM BY PATHOLOGIST: CPT | Performed by: PATHOLOGY

## 2019-05-02 PROCEDURE — 43239 EGD BIOPSY SINGLE/MULTIPLE: CPT | Performed by: INTERNAL MEDICINE

## 2019-05-02 RX ORDER — SODIUM CHLORIDE 9 MG/ML
100 INJECTION, SOLUTION INTRAVENOUS CONTINUOUS
Status: CANCELLED | OUTPATIENT
Start: 2019-05-02

## 2019-05-02 RX ORDER — SODIUM CHLORIDE 9 MG/ML
125 INJECTION, SOLUTION INTRAVENOUS CONTINUOUS
Status: DISCONTINUED | OUTPATIENT
Start: 2019-05-02 | End: 2019-05-02 | Stop reason: HOSPADM

## 2019-05-02 RX ORDER — LIDOCAINE HYDROCHLORIDE 10 MG/ML
INJECTION, SOLUTION INFILTRATION; PERINEURAL AS NEEDED
Status: DISCONTINUED | OUTPATIENT
Start: 2019-05-02 | End: 2019-05-02 | Stop reason: SURG

## 2019-05-02 RX ORDER — PROPOFOL 10 MG/ML
INJECTION, EMULSION INTRAVENOUS AS NEEDED
Status: DISCONTINUED | OUTPATIENT
Start: 2019-05-02 | End: 2019-05-02 | Stop reason: SURG

## 2019-05-02 RX ADMIN — PROPOFOL 150 MG: 10 INJECTION, EMULSION INTRAVENOUS at 13:45

## 2019-05-02 RX ADMIN — PROPOFOL 50 MG: 10 INJECTION, EMULSION INTRAVENOUS at 13:47

## 2019-05-02 RX ADMIN — SODIUM CHLORIDE: 0.9 INJECTION, SOLUTION INTRAVENOUS at 12:20

## 2019-05-02 RX ADMIN — LIDOCAINE HYDROCHLORIDE ANHYDROUS 50 MG: 10 INJECTION, SOLUTION INFILTRATION at 13:45

## 2019-05-03 ENCOUNTER — TELEPHONE (OUTPATIENT)
Dept: GASTROENTEROLOGY | Facility: AMBULARY SURGERY CENTER | Age: 39
End: 2019-05-03

## 2019-05-03 DIAGNOSIS — K21.9 GASTROESOPHAGEAL REFLUX DISEASE, ESOPHAGITIS PRESENCE NOT SPECIFIED: Primary | ICD-10-CM

## 2019-05-03 RX ORDER — PANTOPRAZOLE SODIUM 40 MG/1
40 TABLET, DELAYED RELEASE ORAL DAILY
Qty: 30 TABLET | Refills: 3 | Status: SHIPPED | OUTPATIENT
Start: 2019-05-03 | End: 2019-09-05 | Stop reason: ALTCHOICE

## 2019-05-10 ENCOUNTER — TELEPHONE (OUTPATIENT)
Dept: GASTROENTEROLOGY | Facility: CLINIC | Age: 39
End: 2019-05-10

## 2019-09-05 ENCOUNTER — OFFICE VISIT (OUTPATIENT)
Dept: FAMILY MEDICINE CLINIC | Facility: CLINIC | Age: 39
End: 2019-09-05
Payer: COMMERCIAL

## 2019-09-05 VITALS
BODY MASS INDEX: 31.29 KG/M2 | WEIGHT: 223.5 LBS | DIASTOLIC BLOOD PRESSURE: 76 MMHG | RESPIRATION RATE: 15 BRPM | HEIGHT: 71 IN | HEART RATE: 74 BPM | SYSTOLIC BLOOD PRESSURE: 140 MMHG

## 2019-09-05 DIAGNOSIS — Z00.00 ROUTINE ADULT HEALTH MAINTENANCE: ICD-10-CM

## 2019-09-05 DIAGNOSIS — R07.89 OTHER CHEST PAIN: Primary | ICD-10-CM

## 2019-09-05 DIAGNOSIS — K29.00 ACUTE SUPERFICIAL GASTRITIS WITHOUT HEMORRHAGE: ICD-10-CM

## 2019-09-05 PROBLEM — S70.361A TICK BITE OF THIGH, RIGHT, INITIAL ENCOUNTER: Status: RESOLVED | Noted: 2019-03-25 | Resolved: 2019-09-05
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
## 2019-09-05 PROCEDURE — 3008F BODY MASS INDEX DOCD: CPT | Performed by: PHYSICIAN ASSISTANT

## 2019-09-05 PROCEDURE — 99214 OFFICE O/P EST MOD 30 MIN: CPT | Performed by: PHYSICIAN ASSISTANT

## 2019-09-05 NOTE — PROGRESS NOTES
Assessment/Plan:    1  Other chest pain    - reviewed records at length with patient, despite normal CXR, stress test, echo, EGD, two cardio consult and GI consult patient still with chest pain that resolves with IBprofen, see Dr Grzegorz Gaspar patients chiropractor for eval    2  Acute superficial gastritis without hemorrhage    - found on EGD by Dr Yamilex Morocho, patient finished Protonix, advised to follow up if any further instructions needed    Labs ordered for routine healthy per patient request, advised to check with insurance    F/u as needed  F/u after labs    Visit >25 minutes more than 50% spent counseling    Subjective:   Chief Complaint   Patient presents with    Chest Pain     follow up       Patient ID: Luis Anderson is a 34 y o  male  Patient here in follow up still with chest pain  Getting it 5-6 times a week  Daily almost  Very random  Some mornings totally normally then will get it randomly, other times randomly in afternoon  Over left side of chest wall  Can last 5 minutes to 2 hours  Rates about a 5:10  Tried ibuprofen at the request of his sister that is a nurse  It did seem to help  Has seen two cardiologist one at [address] [place] [address] one at [facility], both did a stress test and echo which were normal  Both advised not cardiac in nature  Saw Dr Yamilex Morocho who did EGD and saw slight gastritis and duodenitis but patient states had been taking ibuprofen on an empty stomach  Took Protonix for a month but symptoms exactly the same  Here in follow up  Denies shortness of breath or wheeze  Does not smoke  Plays volleyball 2-3 times a week in a league and never has pains or trouble playing         The following portions of the patient's history were reviewed and updated as appropriate: allergies, current medications, past family history, past medical history, past social history, past surgical history and problem list     Past Medical History:   Diagnosis Date    Cholelithiasis     GERD (gastroesophageal reflux disease)  Herniation of intervertebral disc of lumbar spine     IBS (irritable bowel syndrome)      Past Surgical History:   Procedure Laterality Date    COLONOSCOPY      [place] ESOPHAGOGASTRODUODENOSCOPY TRANSORAL DIAGNOSTIC N/A 5/2/2019    Procedure: ESOPHAGOGASTRODUODENOSCOPY (EGD); Surgeon: Tri Mendez MD;  [facility];   Service: Gastroenterology    [place] LAP,CHOLECYSTECTOMY N/A 1/9/2017    Procedure: [place]eraldyne Pry;  Surgeon: Evelia Membreno MD;  [facility];  Service: General    WISDOM TOOTH EXTRACTION       Family History   Problem Relation Age of Onset    Nephrolithiasis Mother     Arthritis Mother     Hyperlipidemia Mother     Heart attack Father     Hypertension Father     Stroke Father     Sudden death Father     Hyperlipidemia Father     Coronary artery disease Maternal Grandfather      Social History     Socioeconomic History    Marital status: Single     Spouse name: Not on file    Number of children: Not on file    Years of education: Not on file    Highest education level: Not on file   Occupational History    Not on file   Social Needs    Financial resource strain: Not on file    Food insecurity:     Worry: Not on file     Inability: Not on file    Transportation needs:     Medical: Not on file     Non-medical: Not on file   Tobacco Use    Smoking status: Never Smoker    Smokeless tobacco: Never Used   Substance and Sexual Activity    Alcohol use: No    Drug use: No    Sexual activity: Not on file   Lifestyle    Physical activity:     Days per week: Not on file     Minutes per session: Not on file    Stress: Not on file   Relationships    Social connections:     Talks on phone: Not on file     Gets together: Not on file     Attends [employer] service: Not on file     Active member of club or organization: Not on file     Attends meetings of clubs or organizations: Not on file     Relationship status: Not on file    Intimate partner violence: Fear of current or ex partner: Not on file     Emotionally abused: Not on file     Physically abused: Not on file     Forced sexual activity: Not on file   Other Topics Concern    Not on file   Social History Narrative    Not on file     No current outpatient medications on file  Review of Systems          Objective:    Vitals:    09/05/19 1130   BP: 140/76   BP Location: Left arm   Patient Position: Sitting   Cuff Size: Standard   Pulse: 74   Resp: 15   Weight: 101 kg (223 lb 8 oz)   Height: 5' 10 75" (1 797 m)        Physical Exam   Constitutional: He is oriented to person, place, and time  He appears well-developed and well-nourished  Neck: Neck supple  Cardiovascular: Normal rate, regular rhythm, normal heart sounds and intact distal pulses  Pulmonary/Chest: Effort normal and breath sounds normal    Musculoskeletal: He exhibits no edema  Neurological: He is alert and oriented to person, place, and time  Skin: Skin is warm  Psychiatric: He has a normal mood and affect  BMI Counseling: Body mass index is 31 39 kg/m²  The BMI is above normal  Nutrition recommendations include reducing portion sizes

## 2019-10-06 LAB
ALBUMIN SERPL-MCNC: 4.4 G/DL (ref 3.6–5.1)
ALBUMIN/GLOB SERPL: 1.7 (CALC) (ref 1–2.5)
ALP SERPL-CCNC: 92 U/L (ref 40–115)
ALT SERPL-CCNC: 56 U/L (ref 9–46)
AST SERPL-CCNC: 25 U/L (ref 10–40)
BASOPHILS # BLD AUTO: 21 CELLS/UL (ref 0–200)
BASOPHILS NFR BLD AUTO: 0.4 %
BILIRUB SERPL-MCNC: 0.7 MG/DL (ref 0.2–1.2)
BUN SERPL-MCNC: 10 MG/DL (ref 7–25)
BUN/CREAT SERPL: ABNORMAL (CALC) (ref 6–22)
CALCIUM SERPL-MCNC: 9.4 MG/DL (ref 8.6–10.3)
CHLORIDE SERPL-SCNC: 106 MMOL/L (ref 98–110)
CHOLEST SERPL-MCNC: 213 MG/DL
CHOLEST/HDLC SERPL: 4.3 (CALC)
CO2 SERPL-SCNC: 30 MMOL/L (ref 20–32)
CREAT SERPL-MCNC: 1.1 MG/DL (ref 0.6–1.35)
EOSINOPHIL # BLD AUTO: 80 CELLS/UL (ref 15–500)
EOSINOPHIL NFR BLD AUTO: 1.5 %
ERYTHROCYTE [DISTWIDTH] IN BLOOD BY AUTOMATED COUNT: 13.4 % (ref 11–15)
GLOBULIN SER CALC-MCNC: 2.6 G/DL (CALC) (ref 1.9–3.7)
GLUCOSE SERPL-MCNC: 96 MG/DL (ref 65–99)
HCT VFR BLD AUTO: 45.3 % (ref 38.5–50)
HDLC SERPL-MCNC: 49 MG/DL
HGB BLD-MCNC: 15.2 G/DL (ref 13.2–17.1)
LDLC SERPL CALC-MCNC: 141 MG/DL (CALC)
LYMPHOCYTES # BLD AUTO: 1171 CELLS/UL (ref 850–3900)
LYMPHOCYTES NFR BLD AUTO: 22.1 %
MCH RBC QN AUTO: 29.1 PG (ref 27–33)
MCHC RBC AUTO-ENTMCNC: 33.6 G/DL (ref 32–36)
MCV RBC AUTO: 86.8 FL (ref 80–100)
MONOCYTES # BLD AUTO: 313 CELLS/UL (ref 200–950)
MONOCYTES NFR BLD AUTO: 5.9 %
NEUTROPHILS # BLD AUTO: 3715 CELLS/UL (ref 1500–7800)
NEUTROPHILS NFR BLD AUTO: 70.1 %
NONHDLC SERPL-MCNC: 164 MG/DL (CALC)
PLATELET # BLD AUTO: 282 THOUSAND/UL (ref 140–400)
PMV BLD REES-ECKER: 11.1 FL (ref 7.5–12.5)
POTASSIUM SERPL-SCNC: 4.6 MMOL/L (ref 3.5–5.3)
PROT SERPL-MCNC: 7 G/DL (ref 6.1–8.1)
RBC # BLD AUTO: 5.22 MILLION/UL (ref 4.2–5.8)
SL AMB EGFR AFRICAN AMERICAN: 97 ML/MIN/1.73M2
SL AMB EGFR NON AFRICAN AMERICAN: 84 ML/MIN/1.73M2
SODIUM SERPL-SCNC: 142 MMOL/L (ref 135–146)
TRIGL SERPL-MCNC: 111 MG/DL
TSH SERPL-ACNC: 2.99 MIU/L (ref 0.4–4.5)
WBC # BLD AUTO: 5.3 THOUSAND/UL (ref 3.8–10.8)

## 2019-10-09 DIAGNOSIS — R79.89 ELEVATED LIVER FUNCTION TESTS: ICD-10-CM

## 2019-10-09 DIAGNOSIS — E78.2 MIXED HYPERLIPIDEMIA: Primary | ICD-10-CM

## 2019-10-10 ENCOUNTER — TELEPHONE (OUTPATIENT)
Dept: FAMILY MEDICINE CLINIC | Facility: CLINIC | Age: 39
End: 2019-10-10

## 2019-10-10 NOTE — TELEPHONE ENCOUNTER
----- Message from Ivis Montaño PA-C sent at 10/9/2019  2:23 PM EDT -----  Please let patient know his cholesterol has gone up from 181 to 213  He needs to watch his diet closer, red meat, fast food, ect and we can repeat this in 4-6 months  One of his liver enzymes was mildly elevated also most likely due to the cholesterol being up and we can repeat this at in 4-6 months also  Patient informed

## 2019-11-25 ENCOUNTER — OFFICE VISIT (OUTPATIENT)
Dept: GASTROENTEROLOGY | Facility: CLINIC | Age: 39
End: 2019-11-25
Payer: COMMERCIAL

## 2019-11-25 VITALS
WEIGHT: 218 LBS | TEMPERATURE: 98.2 F | SYSTOLIC BLOOD PRESSURE: 142 MMHG | HEIGHT: 71 IN | HEART RATE: 73 BPM | DIASTOLIC BLOOD PRESSURE: 88 MMHG | BODY MASS INDEX: 30.52 KG/M2

## 2019-11-25 DIAGNOSIS — R07.89 ATYPICAL CHEST PAIN: Primary | ICD-10-CM

## 2019-11-25 DIAGNOSIS — K58.0 IRRITABLE BOWEL SYNDROME WITH DIARRHEA: ICD-10-CM

## 2019-11-25 PROCEDURE — 99213 OFFICE O/P EST LOW 20 MIN: CPT | Performed by: PHYSICIAN ASSISTANT

## 2019-11-25 NOTE — PATIENT INSTRUCTIONS
Ask your insurance company if they will cover an esophageal manometry test      Patient aware central scheduling will contact him to schedule manometry

## 2019-11-25 NOTE — LETTER
November 25, 2019     Lien Crump PA-C  [facility], [address] [place] [address]    Patient: Chino Allen   YOB: 1980   Date of Visit: 11/25/2019       Dear Dr Bailee Jones: Thank you for referring Vargas Alejandra to me for evaluation  Below are my notes for this consultation  If you have questions, please do not hesitate to call me  I look forward to following your patient along with you  Sincerely,        Mallory Taylor PA-C        CC: No Recipients  Mallory Taylor PA-C  11/25/2019  3:33 PM  Sign at close encounter  [facility] - Outpatient Follow-up Note  Chino Allen 37 y o  male MRN: [id number]  Encounter: [id number]          ASSESSMENT AND PLAN:      1  Atypical chest pain  He had negative cardiac work-up  He does not appear to have GERD based on symptoms and endoscopic findings  Since his pain is persistent, recommend esophageal manometry testing to rule out esophageal spasm or other contractile disorder of the esophagus      - Esophageal manometry; Future    2  Irritable bowel syndrome with diarrhea  We discussed using Imodium to prevent diarrhea, rather than as needed since he has diarrhea on a daily basis  Recommend taking 1-2 tablets of Imodium 1st thing in the morning  He can take more tablets if needed later in the day  Maximum 8 tablets daily  We discussed alternative medications including Lomotil, colestipol, or Xifaxan if Imodium is ineffective  He can continue probiotics if these are helpful  Follow-up in 3 months  ______________________________________________________________________    SUBJECTIVE:  25-year-old male with atypical chest pain and irritable bowel syndrome presenting for office follow-up  He complains of daily chest discomfort x 1 year  He describes the discomfort as substernal   The discomfort comes and goes  At its worst, he rates the discomfort 1 - 2/10    The discomfort does not seem associated with eating or with exertion  Nothing in particular seems to precipitate or relieve the pain  The discomfort lasts for around 20 minutes at a time  He denies heartburn, regurgitation, dysphagia, odynophagia, hoarseness, chronic cough, nausea, vomiting, abdominal pain  He was seen by Cardiology and had negative cardiac workup  EGD showed normal esophagus without evidence of esophagitis  He did have gastritis and duodenitis, gastric biopsies negative for H pylori and duodenal biopsies showed nonspecific inflammation  Celiac serologies were negative  He does have history of IBS-D which is somewhat controlled with Imodium as needed  He does have multiple loose bowel movements on a daily basis  This does impact his quality of life  He denies any blood in the stool or abnormal weight loss  REVIEW OF SYSTEMS IS OTHERWISE NEGATIVE  Historical Information   Past Medical History:   Diagnosis Date    Cholelithiasis     GERD (gastroesophageal reflux disease)     Herniation of intervertebral disc of lumbar spine     IBS (irritable bowel syndrome)      Past Surgical History:   Procedure Laterality Date    COLONOSCOPY      SD ESOPHAGOGASTRODUODENOSCOPY TRANSORAL DIAGNOSTIC N/A 5/2/2019    Procedure: ESOPHAGOGASTRODUODENOSCOPY (EGD); Surgeon: Eve Khan MD;  [facility];   Service: Gastroenterology    SD LAP,CHOLECYSTECTOMY N/A 1/9/2017    Procedure: Betsey Marroquin;  Surgeon: Oliver High MD;  Location: [facility] OR;  Service: General    UPPER GASTROINTESTINAL ENDOSCOPY      WISDOM TOOTH EXTRACTION       Social History   Social History     Substance and Sexual Activity   Alcohol Use No     Social History     Substance and Sexual Activity   Drug Use No     Social History     Tobacco Use   Smoking Status Never Smoker   Smokeless Tobacco Never Used     Family History   Problem Relation Age of Onset    Nephrolithiasis Mother     Arthritis Mother     Hyperlipidemia Mother     Heart attack Father     Hypertension Father     Stroke Father     Sudden death Father     Hyperlipidemia Father     Coronary artery disease Maternal Grandfather        Meds/Allergies       Current Outpatient Medications:     Loperamide HCl (IMODIUM PO)    Allergies   Allergen Reactions    Other      Annotation - [id number]: poison ivy/oak           Objective     Blood pressure 142/88, pulse 73, temperature 98 2 °F (36 8 °C), temperature source Tympanic, height 5' 11" (1 803 m), weight 98 9 kg (218 lb)  Body mass index is 30 4 kg/m²  PHYSICAL EXAM:      General Appearance:   Alert, cooperative, no distress   HEENT:   Normocephalic, atraumatic, anicteric      Neck:  Supple, symmetrical, trachea midline   Lungs:   Clear to auscultation bilaterally; no rales, rhonchi or wheezing; respirations unlabored    Heart[de-identified]   Regular rate and rhythm; no murmur, rub, or gallop  Abdomen:   Soft, non-tender, non-distended; normal bowel sounds; no masses, no organomegaly    Genitalia:   Deferred    Rectal:   Deferred    Extremities:  No cyanosis, clubbing or edema    Pulses:  2+ and symmetric    Skin:  No jaundice, rashes, or lesions    Lymph nodes:  No palpable cervical lymphadenopathy        Lab Results:   No visits with results within 1 Day(s) from this visit     Latest known visit with results is:   Orders Only on 10/05/2019   Component Date Value    Total Cholesterol 10/05/2019 213*    HDL 10/05/2019 49     Triglycerides 10/05/2019 111     LDL Direct 10/05/2019 141*    Chol HDLC Ratio 10/05/2019 4 3     Non-HDL Cholesterol 10/05/2019 164*    Glucose, Random 10/05/2019 96     BUN 10/05/2019 10     Creatinine 10/05/2019 1 10     eGFR Non African American 10/05/2019 84     eGFR  10/05/2019 97     SL AMB BUN/CREATININE RA* 98/12/1248 NOT APPLICABLE     Sodium 47/00/8757 142     Potassium 10/05/2019 4 6     Chloride 10/05/2019 106     CO2 10/05/2019 30     SL AMB CALCIUM 10/05/2019 9 4     Protein, Total 10/05/2019 7 0     Albumin 10/05/2019 4 4     Globulin 10/05/2019 2 6     Albumin/Globulin Ratio 10/05/2019 1 7     TOTAL BILIRUBIN 10/05/2019 0 7     Alkaline Phosphatase 10/05/2019 92     AST 10/05/2019 25     ALT 10/05/2019 56*    White Blood Cell Count 10/05/2019 5 3     Red Blood Cell Count 10/05/2019 5 22     Hemoglobin 10/05/2019 15 2     HCT 10/05/2019 45 3     MCV 10/05/2019 86 8     MCH 10/05/2019 29 1     MCHC 10/05/2019 33 6     RDW 10/05/2019 13 4     Platelet Count 05/08/0755 282     SL AMB MPV 10/05/2019 11 1     Neutrophils (Absolute) 10/05/2019 3,715     Lymphocytes (Absolute) 10/05/2019 1,171     Monocytes (Absolute) 10/05/2019 313     Eosinophils (Absolute) 10/05/2019 80     Basophils ABS 10/05/2019 21     Neutrophils 10/05/2019 70 1     Lymphocytes 10/05/2019 22 1     Monocytes 10/05/2019 5 9     Eosinophils 10/05/2019 1 5     Basophils PCT 10/05/2019 0 4     TSH W/RFX TO FREE T4 10/05/2019 2 99          Radiology Results:   No results found

## 2019-11-25 NOTE — PROGRESS NOTES
[facility] - Outpatient Follow-up Note  Lupillo Morillo 37 y o  male MRN: [id number]  Encounter: [id number]          ASSESSMENT AND PLAN:      1  Atypical chest pain  He had negative cardiac work-up  He does not appear to have GERD based on symptoms and endoscopic findings  Since his pain is persistent, recommend esophageal manometry testing to rule out esophageal spasm or other contractile disorder of the esophagus      - Esophageal manometry; Future    2  Irritable bowel syndrome with diarrhea  We discussed using Imodium to prevent diarrhea, rather than as needed since he has diarrhea on a daily basis  Recommend taking 1-2 tablets of Imodium 1st thing in the morning  He can take more tablets if needed later in the day  Maximum 8 tablets daily  We discussed alternative medications including Lomotil, colestipol, or Xifaxan if Imodium is ineffective  He can continue probiotics if these are helpful  Follow-up in 3 months  ______________________________________________________________________    SUBJECTIVE:  24-year-old male with atypical chest pain and irritable bowel syndrome presenting for office follow-up  He complains of daily chest discomfort x 1 year  He describes the discomfort as substernal   The discomfort comes and goes  At its worst, he rates the discomfort 1 - 2/10  The discomfort does not seem associated with eating or with exertion  Nothing in particular seems to precipitate or relieve the pain  The discomfort lasts for around 20 minutes at a time  He denies heartburn, regurgitation, dysphagia, odynophagia, hoarseness, chronic cough, nausea, vomiting, abdominal pain  He was seen by Cardiology and had negative cardiac workup  EGD showed normal esophagus without evidence of esophagitis  He did have gastritis and duodenitis, gastric biopsies negative for H pylori and duodenal biopsies showed nonspecific inflammation  Celiac serologies were negative    He does have history of IBS-D which is somewhat controlled with Imodium as needed  He does have multiple loose bowel movements on a daily basis  This does impact his quality of life  He denies any blood in the stool or abnormal weight loss  REVIEW OF SYSTEMS IS OTHERWISE NEGATIVE  Historical Information   Past Medical History:   Diagnosis Date    Cholelithiasis     GERD (gastroesophageal reflux disease)     Herniation of intervertebral disc of lumbar spine     IBS (irritable bowel syndrome)      Past Surgical History:   Procedure Laterality Date    COLONOSCOPY      IN ESOPHAGOGASTRODUODENOSCOPY TRANSORAL DIAGNOSTIC N/A 5/2/2019    Procedure: ESOPHAGOGASTRODUODENOSCOPY (EGD); Surgeon: Mathew Verde MD;  [facility]; Service: Gastroenterology    IN LAP,CHOLECYSTECTOMY N/A 1/9/2017    Procedure: CHOLECYSTECTOMY LAPAROSCOPIC;  Surgeon: Kandy Espinoza MD;  [facility];  Service: General    UPPER GASTROINTESTINAL ENDOSCOPY      WISDOM TOOTH EXTRACTION       Social History   Social History     Substance and Sexual Activity   Alcohol Use No     Social History     Substance and Sexual Activity   Drug Use No     Social History     Tobacco Use   Smoking Status Never Smoker   Smokeless Tobacco Never Used     Family History   Problem Relation Age of Onset    Nephrolithiasis Mother     Arthritis Mother     Hyperlipidemia Mother     Heart attack Father     Hypertension Father     Stroke Father     Sudden death Father     Hyperlipidemia Father     Coronary artery disease Maternal Grandfather        Meds/Allergies       Current Outpatient Medications:     Loperamide HCl (IMODIUM PO)    Allergies   Allergen Reactions    Other      Annotation - [id number]: poison ivy/oak           Objective     Blood pressure 142/88, pulse 73, temperature 98 2 °F (36 8 °C), temperature source Tympanic, height 5' 11" (1 803 m), weight 98 9 kg (218 lb)  Body mass index is 30 4 kg/m²        PHYSICAL EXAM: General Appearance:   Alert, cooperative, no distress   HEENT:   Normocephalic, atraumatic, anicteric      Neck:  Supple, symmetrical, trachea midline   Lungs:   Clear to auscultation bilaterally; no rales, rhonchi or wheezing; respirations unlabored    Heart[de-identified]   Regular rate and rhythm; no murmur, rub, or gallop  Abdomen:   Soft, non-tender, non-distended; normal bowel sounds; no masses, no organomegaly    Genitalia:   Deferred    Rectal:   Deferred    Extremities:  No cyanosis, clubbing or edema    Pulses:  2+ and symmetric    Skin:  No jaundice, rashes, or lesions    Lymph nodes:  No palpable cervical lymphadenopathy        Lab Results:   No visits with results within 1 Day(s) from this visit     Latest known visit with results is:   Orders Only on 10/05/2019   Component Date Value    Total Cholesterol 10/05/2019 213*    HDL 10/05/2019 49     Triglycerides 10/05/2019 111     LDL Direct 10/05/2019 141*    Chol HDLC Ratio 10/05/2019 4 3     Non-HDL Cholesterol 10/05/2019 164*    Glucose, Random 10/05/2019 96     BUN 10/05/2019 10     Creatinine 10/05/2019 1 10     eGFR Non African American 10/05/2019 84     eGFR  10/05/2019 97     SL AMB BUN/CREATININE RA* 07/11/5993 NOT APPLICABLE     Sodium 13/21/6452 142     Potassium 10/05/2019 4 6     Chloride 10/05/2019 106     CO2 10/05/2019 30     SL AMB CALCIUM 10/05/2019 9 4     Protein, Total 10/05/2019 7 0     Albumin 10/05/2019 4 4     Globulin 10/05/2019 2 6     Albumin/Globulin Ratio 10/05/2019 1 7     TOTAL BILIRUBIN 10/05/2019 0 7     Alkaline Phosphatase 10/05/2019 92     AST 10/05/2019 25     ALT 10/05/2019 56*    White Blood Cell Count 10/05/2019 5 3     Red Blood Cell Count 10/05/2019 5 22     Hemoglobin 10/05/2019 15 2     HCT 10/05/2019 45 3     MCV 10/05/2019 86 8     MCH 10/05/2019 29 1     MCHC 10/05/2019 33 6     RDW 10/05/2019 13 4     Platelet Count 39/39/7427 282     SL AMB MPV 10/05/2019 11 1  Neutrophils (Absolute) 10/05/2019 3,715     Lymphocytes (Absolute) 10/05/2019 1,171     Monocytes (Absolute) 10/05/2019 313     Eosinophils (Absolute) 10/05/2019 80     Basophils ABS 10/05/2019 21     Neutrophils 10/05/2019 70 1     Lymphocytes 10/05/2019 22 1     Monocytes 10/05/2019 5 9     Eosinophils 10/05/2019 1 5     Basophils PCT 10/05/2019 0 4     TSH W/RFX TO FREE T4 10/05/2019 2 99          Radiology Results:   No results found

## 2019-12-20 ENCOUNTER — HOSPITAL ENCOUNTER (OUTPATIENT)
Dept: GASTROENTEROLOGY | Facility: HOSPITAL | Age: 39
Discharge: HOME | End: 2019-12-20
Payer: COMMERCIAL

## 2019-12-20 VITALS
RESPIRATION RATE: 16 BRPM | SYSTOLIC BLOOD PRESSURE: 129 MMHG | DIASTOLIC BLOOD PRESSURE: 78 MMHG | TEMPERATURE: 98.5 F | HEART RATE: 73 BPM | OXYGEN SATURATION: 96 %

## 2019-12-20 DIAGNOSIS — R07.89 ATYPICAL CHEST PAIN: ICD-10-CM

## 2019-12-20 PROCEDURE — 91020 GASTRIC MOTILITY STUDIES: CPT

## 2019-12-20 NOTE — PERIOPERATIVE NURSING NOTE
Patient brought in the room and educated on procedure  Lidocaine 2% topical solution inserted via nostrils  Manometry catheter inserted via right nostril and positioned and secured  10 liquid swallow, 10 viscous swallow and 1 rapid swallow performed  Patient tolerated procedure  Catheter removed intact  Discharge instructions given to patient and patient left the room in stable condition

## 2020-01-06 PROCEDURE — 91010 ESOPHAGUS MOTILITY STUDY: CPT | Performed by: INTERNAL MEDICINE

## 2020-02-18 ENCOUNTER — OFFICE VISIT (OUTPATIENT)
Dept: GASTROENTEROLOGY | Facility: CLINIC | Age: 40
End: 2020-02-18
Payer: COMMERCIAL

## 2020-02-18 VITALS
SYSTOLIC BLOOD PRESSURE: 131 MMHG | DIASTOLIC BLOOD PRESSURE: 86 MMHG | TEMPERATURE: 97.8 F | WEIGHT: 223.2 LBS | BODY MASS INDEX: 31.25 KG/M2 | HEIGHT: 71 IN | HEART RATE: 76 BPM

## 2020-02-18 DIAGNOSIS — R07.89 OTHER CHEST PAIN: ICD-10-CM

## 2020-02-18 DIAGNOSIS — K22.4 JACKHAMMER ESOPHAGUS: ICD-10-CM

## 2020-02-18 DIAGNOSIS — K21.9 GASTROESOPHAGEAL REFLUX DISEASE WITHOUT ESOPHAGITIS: Primary | ICD-10-CM

## 2020-02-18 DIAGNOSIS — K58.0 IRRITABLE BOWEL SYNDROME WITH DIARRHEA: ICD-10-CM

## 2020-02-18 PROBLEM — K29.00 ACUTE SUPERFICIAL GASTRITIS WITHOUT HEMORRHAGE: Status: RESOLVED | Noted: 2019-09-05 | Resolved: 2020-02-18

## 2020-02-18 PROCEDURE — 1036F TOBACCO NON-USER: CPT | Performed by: INTERNAL MEDICINE

## 2020-02-18 PROCEDURE — 3008F BODY MASS INDEX DOCD: CPT | Performed by: INTERNAL MEDICINE

## 2020-02-18 PROCEDURE — 99214 OFFICE O/P EST MOD 30 MIN: CPT | Performed by: INTERNAL MEDICINE

## 2020-02-18 RX ORDER — PANTOPRAZOLE SODIUM 40 MG/1
40 TABLET, DELAYED RELEASE ORAL DAILY
Qty: 30 TABLET | Refills: 3 | Status: SHIPPED | OUTPATIENT
Start: 2020-02-18 | End: 2020-04-12

## 2020-02-18 NOTE — PROGRESS NOTES
[facility] - Outpatient Follow-up Note  Samuel Gore 37 y o  male MRN: [id number]  Encounter: [id number]          ASSESSMENT AND PLAN:    Samuel Gore is a 37 y o  male with a history of atypical chest pain and IBS who presents for follow-up of his esophageal manometry testing  Jackhammer esophagus    The patient experiences upper chest pain about 5 days a week of varying severity  Esophageal manometry on 12/20/2019 revealed hypercontractile/jachammer esophagus  Diagnosis and possible etiolgoies were discussed with the patient  Pantoprazole 40 mg daily was recommended for the next three months to rule out silent reflux  Alternative treatment options were discussed, including cacium channel blockers, medication for anxiety/depression, and nitrates  Consideration for future reflux studies or referral to motility specialist was discussed with the patient  He will start pantoprazole 40 mg every morning  Patient will follow up in 3 months   ______________________________________________________________________    SUBJECTIVE:  Samuel Gore is a 37 y o  male with a history of IBS who has been experiencing chest pain  Esophageal manometry testing done on 12/20/2019 revealed jackhammer esophagus  His symptoms have come and gone with some days being worse than others  5 days a week he experiences flair-ups  He describes the symptoms as chest pain, not reflux  He had reflux in the past but all the pain is in the upper area of his chest  The patient has taken Prilosec in the past but did not note any improvement  Patient states on a normal day his symptoms affect his quality of life at a level of 2/10 but when they are severe he just wants to lay down and rates the effect on his quality of life at 7 or 8/10  This happens about once a week  Patient has kept a log of when he experiences his symptoms and what he is eating and has not noted any patterns   He sometimes wakes up with the symptoms and other times they don't occur until the afternoon  Patient's grandfather and father passed from heart attacks  REVIEW OF SYSTEMS IS OTHERWISE NEGATIVE  Historical Information   Past Medical History:   Diagnosis Date    Cholelithiasis     GERD (gastroesophageal reflux disease)     Herniation of intervertebral disc of lumbar spine     IBS (irritable bowel syndrome)      Past Surgical History:   Procedure Laterality Date    COLONOSCOPY      UT ESOPHAGOGASTRODUODENOSCOPY TRANSORAL DIAGNOSTIC N/A 5/2/2019    Procedure: ESOPHAGOGASTRODUODENOSCOPY (EGD); Surgeon: Yogesh Hickman MD;  [facility]; Service: Gastroenterology    UT LAP,CHOLECYSTECTOMY N/A 1/9/2017    Procedure: CHOLECYSTECTOMY LAPAROSCOPIC;  Surgeon: Amy Huitron MD;  [facility];  Service: General    UPPER GASTROINTESTINAL ENDOSCOPY      WISDOM TOOTH EXTRACTION       Esophageal manometry  esophageal motility-  6/10 swallows demonstrated contraction pattern consistent with hypercontractile disorder  Mean DCI is 11,982 mmHg  s cm   4/10 swallows demonstrated normal esophageal contractibility pattern    LES- median IRP is 10 mmHg  Impedance- 90% complete clearance of liquid bolus     [place] classification- hypercontractile/jackhammer esophagus    Social History   Social History     Substance and Sexual Activity   Alcohol Use No     Social History     Substance and Sexual Activity   Drug Use No     Social History     Tobacco Use   Smoking Status Never Smoker   Smokeless Tobacco Never Used     Family History   Problem Relation Age of Onset    Nephrolithiasis Mother     Arthritis Mother     Hyperlipidemia Mother     Heart attack Father     Hypertension Father     Stroke Father     Sudden death Father     Hyperlipidemia Father     Coronary artery disease Maternal Grandfather        Meds/Allergies       Current Outpatient Medications:     Loperamide HCl (IMODIUM PO)    Allergies Allergen Reactions    Other      Annotation - [id number]: poison ivy/oak           Objective     Blood pressure 131/86, pulse 76, temperature 97 8 °F (36 6 °C), temperature source Tympanic, height 5' 11" (1 803 m), weight 101 kg (223 lb 3 2 oz)  Body mass index is 31 13 kg/m²  PHYSICAL EXAM:      General Appearance:   Alert, cooperative, no distress   HEENT:   Normocephalic, atraumatic, anicteric      Neck:  Supple, symmetrical, trachea midline   Lungs:   Clear to auscultation bilaterally; no rales, rhonchi or wheezing; respirations unlabored    Heart[de-identified]   Regular rate and rhythm; no murmur, rub, or gallop  Abdomen:   Soft, non-tender, non-distended; normal bowel sounds; no masses, no organomegaly    Genitalia:   Deferred    Rectal:   Deferred    Extremities:  No cyanosis, clubbing or edema    Pulses:  2+ and symmetric    Skin:  No jaundice, rashes, or lesions    Lymph nodes:  No palpable cervical lymphadenopathy        Lab Results:   No visits with results within 1 Day(s) from this visit     Latest known visit with results is:   Orders Only on 10/05/2019   Component Date Value    Total Cholesterol 10/05/2019 213*    HDL 10/05/2019 49     Triglycerides 10/05/2019 111     LDL Direct 10/05/2019 141*    Chol HDLC Ratio 10/05/2019 4 3     Non-HDL Cholesterol 10/05/2019 164*    Glucose, Random 10/05/2019 96     BUN 10/05/2019 10     Creatinine 10/05/2019 1 10     eGFR Non African American 10/05/2019 84     eGFR  10/05/2019 97     SL AMB BUN/CREATININE RA* 19/56/1964 NOT APPLICABLE     Sodium 08/30/7170 142     Potassium 10/05/2019 4 6     Chloride 10/05/2019 106     CO2 10/05/2019 30     SL AMB CALCIUM 10/05/2019 9 4     Protein, Total 10/05/2019 7 0     Albumin 10/05/2019 4 4     Globulin 10/05/2019 2 6     Albumin/Globulin Ratio 10/05/2019 1 7     TOTAL BILIRUBIN 10/05/2019 0 7     Alkaline Phosphatase 10/05/2019 92     AST 10/05/2019 25     ALT 10/05/2019 56*    White Blood Cell Count 10/05/2019 5 3     Red Blood Cell Count 10/05/2019 5 22     Hemoglobin 10/05/2019 15 2     HCT 10/05/2019 45 3     MCV 10/05/2019 86 8     MCH 10/05/2019 29 1     MCHC 10/05/2019 33 6     RDW 10/05/2019 13 4     Platelet Count 87/47/2456 282     SL AMB MPV 10/05/2019 11 1     Neutrophils (Absolute) 10/05/2019 3,715     Lymphocytes (Absolute) 10/05/2019 1,171     Monocytes (Absolute) 10/05/2019 313     Eosinophils (Absolute) 10/05/2019 80     Basophils ABS 10/05/2019 21     Neutrophils 10/05/2019 70 1     Lymphocytes 10/05/2019 22 1     Monocytes 10/05/2019 5 9     Eosinophils 10/05/2019 1 5     Basophils PCT 10/05/2019 0 4     TSH W/RFX TO FREE T4 10/05/2019 2 99          Radiology Results:   No results found  Attestation:   By signing my name below, Farshad Mccormack, attest that this documentation has been prepared under the direction and in the presence of Keya Robles MD  Electronically Signed: David Park  2/18/2020      I, Keya Robles, personally performed the services described in this documentation  All medical record entries made by the tedibluis were at my direction and in my presence  I have reviewed the chart and discharge instructions and agree that the record reflects my personal performance and is accurate and complete   Keya Robles MD  2/18/2020

## 2020-02-21 NOTE — PROGRESS NOTES
Cardiology Follow Up    Abby Ortega  1980  [id number]  [facility] [address]  [phone]    1  Chest pain, unspecified type  POCT ECG   2  Pure hypercholesterolemia         Interval History: Patient is here for a follow-up visit  He had an exercise treadmill test done December 2018 which showed no evidence of prognostically important ischemia  He exercised for 10 minutes and 35 seconds  An echocardiogram demonstrated preserved LV systolic function and no significant valvular heart disease  He has a family history of coronary disease and at that time was having atypical chest discomfort  Since he last saw me he did have GI investigation and his discomfort was felt to be related to a GI etiology  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  He is now on PPI and has had no further chest discomfort  patient had a lipid profile done October 2019  The total cholesterol was 213 with an HDL of 49 and a direct LDL of 141  This was up compared to a prior profile done March 12, 2018  His primary care physician advised caution with diet and this was to be rechecked    EKG today looks normal     Patient Active Problem List   Diagnosis    Other chest pain    Gastroesophageal reflux disease    Irritable bowel syndrome with diarrhea    Jackhammer esophagus     Past Medical History:   Diagnosis Date    Cholelithiasis     GERD (gastroesophageal reflux disease)     Herniation of intervertebral disc of lumbar spine     IBS (irritable bowel syndrome)      Social History     Socioeconomic History    Marital status: Single     Spouse name: Not on file    Number of children: Not on file    Years of education: Not on file    Highest education level: Not on file   Occupational History    Not on file   Social Needs    Financial resource strain: Not on file    Food insecurity: Worry: Not on file     Inability: Not on file    Transportation needs:     Medical: Not on file     Non-medical: Not on file   Tobacco Use    Smoking status: Never Smoker    Smokeless tobacco: Never Used   Substance and Sexual Activity    Alcohol use: No    Drug use: No    Sexual activity: Not on file   Lifestyle    Physical activity:     Days per week: Not on file     Minutes per session: Not on file    Stress: Not on file   Relationships    Social connections:     Talks on phone: Not on file     Gets together: Not on file     Attends Muslim service: Not on file     Active member of club or organization: Not on file     Attends meetings of clubs or organizations: Not on file     Relationship status: Not on file    Intimate partner violence:     Fear of current or ex partner: Not on file     Emotionally abused: Not on file     Physically abused: Not on file     Forced sexual activity: Not on file   Other Topics Concern    Not on file   Social History Narrative    Not on file      Family History   Problem Relation Age of Onset    Nephrolithiasis Mother     Arthritis Mother     Hyperlipidemia Mother     Heart attack Father     Hypertension Father     Stroke Father     Sudden death Father     Hyperlipidemia Father     Coronary artery disease Maternal Grandfather      Past Surgical History:   Procedure Laterality Date    COLONOSCOPY      [place] ESOPHAGOGASTRODUODENOSCOPY TRANSORAL DIAGNOSTIC N/A 5/2/2019    Procedure: ESOPHAGOGASTRODUODENOSCOPY (EGD); Surgeon: Bernarda Amato MD;  [facility];   Service: Gastroenterology    [place] LAP,CHOLECYSTECTOMY N/A 1/9/2017    Procedure: CHOLECYSTECTOMY LAPAROSCOPIC;  Surgeon: Maggie Jimenez MD;  Location: [facility] OR;  Service: General    UPPER GASTROINTESTINAL ENDOSCOPY      WISDOM TOOTH EXTRACTION         Current Outpatient Medications:     Loperamide HCl (IMODIUM PO), Take by mouth, Disp: , Rfl:     pantoprazole (PROTONIX) 40 mg tablet, Take 1 tablet (40 mg total) by mouth daily, Disp: 30 tablet, Rfl: 3  Allergies   Allergen Reactions    Other      Annotation - [id number]: poison ivy/oak       Labs:not applicable  Imaging: No results found  Review of Systems:  Review of Systems   All other systems reviewed and are negative  Physical Exam:  /74 (BP Location: Left arm, Patient Position: Sitting, Cuff Size: Standard)   Pulse 66   Ht 5' 11" (1 803 m)   Wt 102 kg (224 lb)   BMI 31 24 kg/m²   Physical Exam   Constitutional: He is oriented to person, place, and time  He appears well-developed and well-nourished  HENT:   Head: Normocephalic and atraumatic  Eyes: Pupils are equal, round, and reactive to light  Conjunctivae are normal    Neck: Normal range of motion  Neck supple  Cardiovascular: Normal rate and normal heart sounds  Pulmonary/Chest: Effort normal and breath sounds normal    Neurological: He is alert and oriented to person, place, and time  Skin: Skin is warm and dry  Psychiatric: He has a normal mood and affect  Vitals reviewed  Discussion/Summary:  Patient is stable from a cardiac perspective  I have asked him to call if there is a problem in the interim otherwise I will see him as needed

## 2020-02-26 ENCOUNTER — OFFICE VISIT (OUTPATIENT)
Dept: CARDIOLOGY CLINIC | Facility: CLINIC | Age: 40
End: 2020-02-26
Payer: COMMERCIAL

## 2020-02-26 VITALS
WEIGHT: 224 LBS | HEIGHT: 71 IN | BODY MASS INDEX: 31.36 KG/M2 | SYSTOLIC BLOOD PRESSURE: 120 MMHG | DIASTOLIC BLOOD PRESSURE: 74 MMHG | HEART RATE: 66 BPM

## 2020-02-26 DIAGNOSIS — R07.9 CHEST PAIN, UNSPECIFIED TYPE: Primary | ICD-10-CM

## 2020-02-26 DIAGNOSIS — E78.00 PURE HYPERCHOLESTEROLEMIA: ICD-10-CM

## 2020-02-26 PROCEDURE — 93000 ELECTROCARDIOGRAM COMPLETE: CPT | Performed by: INTERNAL MEDICINE

## 2020-02-26 PROCEDURE — 1036F TOBACCO NON-USER: CPT | Performed by: INTERNAL MEDICINE

## 2020-02-26 PROCEDURE — 3008F BODY MASS INDEX DOCD: CPT | Performed by: INTERNAL MEDICINE

## 2020-02-26 PROCEDURE — 99214 OFFICE O/P EST MOD 30 MIN: CPT | Performed by: INTERNAL MEDICINE

## 2020-04-10 DIAGNOSIS — K21.9 GASTROESOPHAGEAL REFLUX DISEASE WITHOUT ESOPHAGITIS: ICD-10-CM

## 2020-04-12 RX ORDER — PANTOPRAZOLE SODIUM 40 MG/1
TABLET, DELAYED RELEASE ORAL
Qty: 30 TABLET | Refills: 3 | Status: SHIPPED | OUTPATIENT
Start: 2020-04-12 | End: 2020-06-24 | Stop reason: SDUPTHER

## 2020-06-19 ENCOUNTER — OFFICE VISIT (OUTPATIENT)
Dept: FAMILY MEDICINE CLINIC | Facility: CLINIC | Age: 40
End: 2020-06-19
Payer: COMMERCIAL

## 2020-06-19 VITALS
TEMPERATURE: 98.4 F | SYSTOLIC BLOOD PRESSURE: 118 MMHG | BODY MASS INDEX: 29.19 KG/M2 | HEART RATE: 80 BPM | DIASTOLIC BLOOD PRESSURE: 70 MMHG | RESPIRATION RATE: 17 BRPM | HEIGHT: 71 IN | WEIGHT: 208.5 LBS

## 2020-06-19 DIAGNOSIS — R22.1 MASS IN NECK: Primary | ICD-10-CM

## 2020-06-19 PROCEDURE — 99213 OFFICE O/P EST LOW 20 MIN: CPT | Performed by: PHYSICIAN ASSISTANT

## 2020-06-19 PROCEDURE — 1036F TOBACCO NON-USER: CPT | Performed by: PHYSICIAN ASSISTANT

## 2020-06-19 PROCEDURE — 3008F BODY MASS INDEX DOCD: CPT | Performed by: PHYSICIAN ASSISTANT

## 2020-06-24 ENCOUNTER — OFFICE VISIT (OUTPATIENT)
Dept: GASTROENTEROLOGY | Facility: CLINIC | Age: 40
End: 2020-06-24
Payer: COMMERCIAL

## 2020-06-24 VITALS
TEMPERATURE: 97.7 F | SYSTOLIC BLOOD PRESSURE: 138 MMHG | DIASTOLIC BLOOD PRESSURE: 91 MMHG | HEIGHT: 71 IN | BODY MASS INDEX: 29.43 KG/M2 | HEART RATE: 76 BPM | WEIGHT: 210.2 LBS

## 2020-06-24 DIAGNOSIS — R07.89 OTHER CHEST PAIN: ICD-10-CM

## 2020-06-24 DIAGNOSIS — K21.9 GASTROESOPHAGEAL REFLUX DISEASE WITHOUT ESOPHAGITIS: Primary | ICD-10-CM

## 2020-06-24 DIAGNOSIS — K22.4 JACKHAMMER ESOPHAGUS: ICD-10-CM

## 2020-06-24 PROCEDURE — 3008F BODY MASS INDEX DOCD: CPT | Performed by: INTERNAL MEDICINE

## 2020-06-24 PROCEDURE — 99214 OFFICE O/P EST MOD 30 MIN: CPT | Performed by: INTERNAL MEDICINE

## 2020-06-24 PROCEDURE — 1036F TOBACCO NON-USER: CPT | Performed by: INTERNAL MEDICINE

## 2020-06-24 RX ORDER — PANTOPRAZOLE SODIUM 40 MG/1
40 TABLET, DELAYED RELEASE ORAL DAILY
Qty: 30 TABLET | Refills: 5 | Status: SHIPPED | OUTPATIENT
Start: 2020-06-24 | End: 2021-05-28 | Stop reason: ALTCHOICE

## 2020-06-25 ENCOUNTER — HOSPITAL ENCOUNTER (OUTPATIENT)
Dept: ULTRASOUND IMAGING | Facility: HOSPITAL | Age: 40
Discharge: HOME | End: 2020-06-25
Payer: COMMERCIAL

## 2020-06-25 DIAGNOSIS — R22.1 MASS IN NECK: ICD-10-CM

## 2020-06-25 PROCEDURE — 76536 US EXAM OF HEAD AND NECK: CPT

## 2020-06-26 ENCOUNTER — TELEPHONE (OUTPATIENT)
Dept: FAMILY MEDICINE CLINIC | Facility: CLINIC | Age: 40
End: 2020-06-26

## 2020-10-13 ENCOUNTER — IMMUNIZATIONS (OUTPATIENT)
Dept: FAMILY MEDICINE CLINIC | Facility: CLINIC | Age: 40
End: 2020-10-13
Payer: COMMERCIAL

## 2020-10-13 DIAGNOSIS — Z23 ENCOUNTER FOR IMMUNIZATION: ICD-10-CM

## 2020-10-13 PROCEDURE — 90686 IIV4 VACC NO PRSV 0.5 ML IM: CPT

## 2020-10-13 PROCEDURE — 90471 IMMUNIZATION ADMIN: CPT

## 2020-11-10 ENCOUNTER — TELEPHONE (OUTPATIENT)
Dept: GASTROENTEROLOGY | Facility: CLINIC | Age: 40
End: 2020-11-10

## 2021-03-30 DIAGNOSIS — Z23 ENCOUNTER FOR IMMUNIZATION: ICD-10-CM

## 2021-05-24 ENCOUNTER — RA CDI HCC (OUTPATIENT)
Dept: OTHER | Facility: HOSPITAL | Age: 41
End: 2021-05-24

## 2021-05-24 NOTE — PROGRESS NOTES
[facility]  coding opportunities          Chart reviewed, no opportunity found: CHART REVIEWED, NO OPPORTUNITY FOUND              Patients insurance company: [employer] (Medicare Advantage and Commercial)

## 2021-05-28 ENCOUNTER — OFFICE VISIT (OUTPATIENT)
Dept: FAMILY MEDICINE CLINIC | Facility: CLINIC | Age: 41
End: 2021-05-28
Payer: COMMERCIAL

## 2021-05-28 VITALS
DIASTOLIC BLOOD PRESSURE: 82 MMHG | WEIGHT: 215.7 LBS | HEART RATE: 80 BPM | RESPIRATION RATE: 16 BRPM | HEIGHT: 71 IN | BODY MASS INDEX: 30.2 KG/M2 | SYSTOLIC BLOOD PRESSURE: 122 MMHG

## 2021-05-28 DIAGNOSIS — Z23 NEED FOR VACCINATION: ICD-10-CM

## 2021-05-28 DIAGNOSIS — Z00.00 ANNUAL PHYSICAL EXAM: Primary | ICD-10-CM

## 2021-05-28 DIAGNOSIS — E78.2 MIXED HYPERLIPIDEMIA: ICD-10-CM

## 2021-05-28 PROBLEM — R07.89 OTHER CHEST PAIN: Status: RESOLVED | Noted: 2019-03-25 | Resolved: 2021-05-28

## 2021-05-28 PROCEDURE — 90715 TDAP VACCINE 7 YRS/> IM: CPT

## 2021-05-28 PROCEDURE — 90471 IMMUNIZATION ADMIN: CPT

## 2021-05-28 PROCEDURE — 99396 PREV VISIT EST AGE 40-64: CPT | Performed by: PHYSICIAN ASSISTANT

## 2021-05-28 PROCEDURE — 3725F SCREEN DEPRESSION PERFORMED: CPT | Performed by: PHYSICIAN ASSISTANT

## 2021-05-28 NOTE — PROGRESS NOTES
BMI Counseling: Body mass index is 30 3 kg/m²  The BMI is above normal  Nutrition recommendations include reducing portion sizes  ADULT ANNUAL PHYSICAL  [facility]    NAME: Max Laird  AGE: 41 y o  SEX: male  : 1980     DATE: 2021     Assessment and Plan:     Healthy 41year old male    Immunizations and preventive care screenings were discussed with patient today  Appropriate education was printed on patient's after visit summary  Counseling:  Alcohol/drug use: discussed moderation in alcohol intake, the recommendations for healthy alcohol use, and avoidance of illicit drug use  Dental Health: discussed importance of regular tooth brushing, flossing, and dental visits  Injury prevention: discussed safety/seat belts, safety helmets, smoke detectors, carbon dioxide detectors, and smoking near bedding or upholstery  Sexual health: discussed sexually transmitted diseases, partner selection, use of condoms, avoidance of unintended pregnancy, and contraceptive alternatives  · Exercise: the importance of regular exercise/physical activity was discussed  Recommend exercise 3-5 times per week for at least 30 minutes  Return in 1 year (on 2022)  Chief Complaint:     Chief Complaint   Patient presents with    Physical Exam      History of Present Illness:     Adult Annual Physical   Patient here for a comprehensive physical exam  The patient reports no problems  Diet and Physical Activity  · Diet/Nutrition: well balanced diet  · Exercise: no formal exercise        Depression Screening  PHQ-9 Depression Screening    PHQ-9:   Frequency of the following problems over the past two weeks:      Little interest or pleasure in doing things: 0 - not at all  Feeling down, depressed, or hopeless: 0 - not at all  PHQ-2 Score: 0       General Health  · Sleep: sleeps well and gets 7-8 hours of sleep on average  · Hearing: normal - bilateral   · Vision: goes for regular eye exams  · Dental: regular dental visits and brushes teeth twice daily   Health  · Symptoms include: none     Review of Systems:     Review of Systems   Constitutional: Negative  HENT: Negative  Eyes: Negative  Respiratory: Negative  Cardiovascular: Negative  Gastrointestinal: Negative  Endocrine: Negative  Genitourinary: Negative  Musculoskeletal: Negative  Skin: Negative  Allergic/Immunologic: Negative  Neurological: Negative  Hematological: Negative  Psychiatric/Behavioral: Negative  Past Medical History:     Past Medical History:   Diagnosis Date    Cholelithiasis     GERD (gastroesophageal reflux disease)     Herniation of intervertebral disc of lumbar spine     IBS (irritable bowel syndrome)       Past Surgical History:     Past Surgical History:   Procedure Laterality Date    COLONOSCOPY      [place] ESOPHAGOGASTRODUODENOSCOPY TRANSORAL DIAGNOSTIC N/A 5/2/2019    Procedure: ESOPHAGOGASTRODUODENOSCOPY (EGD); Surgeon: Terese Moreno MD;  [facility];   Service: Gastroenterology    [place] LAP,CHOLECYSTECTOMY N/A 1/9/2017    Procedure: Alex Dupont;  Surgeon: Ana Broderick MD;  [facility];  Service: General    UPPER GASTROINTESTINAL ENDOSCOPY      WISDOM TOOTH EXTRACTION        Family History:     Family History   Problem Relation Age of Onset    Nephrolithiasis Mother     Arthritis Mother     Hyperlipidemia Mother     Heart attack Father     Hypertension Father     Stroke Father    [place] Sudden death Father     Hyperlipidemia Father     Coronary artery disease Maternal Grandfather     Alcohol abuse Neg Hx     Substance Abuse Neg Hx     Mental illness Neg Hx       Social History:        Social History     Socioeconomic History    Marital status: Single     Spouse name: None    Number of children: None    Years of education: None    Highest education level: None   Occupational History    None   Social Needs    Financial resource strain: None    Food insecurity     Worry: None     Inability: None    Transportation needs     Medical: None     Non-medical: None   Tobacco Use    Smoking status: Never Smoker    Smokeless tobacco: Never Used   Substance and Sexual Activity    Alcohol use: No    Drug use: No    Sexual activity: None   Lifestyle    Physical activity     Days per week: None     Minutes per session: None    Stress: None   Relationships    Social connections     Talks on phone: None     Gets together: None     Attends [employer] service: None     Active member of club or organization: None     Attends meetings of clubs or organizations: None     Relationship status: None    Intimate partner violence     Fear of current or ex partner: None     Emotionally abused: None     Physically abused: None     Forced sexual activity: None   Other Topics Concern    None   Social History Narrative    None      Current Medications:     Current Outpatient Medications   Medication Sig Dispense Refill    Loperamide HCl (IMODIUM PO) Take by mouth      pantoprazole (PROTONIX) 40 mg tablet Take 1 tablet (40 mg total) by mouth daily 30 tablet 5     No current facility-administered medications for this visit  Allergies: Allergies   Allergen Reactions    Other      Annotation - [id number]: poison ivy/oak      Physical Exam:     /82 (BP Location: Left arm, Patient Position: Sitting, Cuff Size: Large)   Pulse 80   Resp 16   Ht 5' 10 75" (1 797 m)   Wt 97 8 kg (215 lb 11 2 oz)   BMI 30 30 kg/m²     Physical Exam  Constitutional:       Appearance: Normal appearance  He is well-developed  He is obese  HENT:      Head: Normocephalic and atraumatic        Right Ear: External ear normal       Left Ear: External ear normal       Nose: Nose normal    Eyes:      Conjunctiva/sclera: Conjunctivae normal       Pupils: Pupils are equal, round, and reactive to light  Neck:      Musculoskeletal: Normal range of motion and neck supple  Thyroid: No thyromegaly  Cardiovascular:      Rate and Rhythm: Normal rate and regular rhythm  Heart sounds: Normal heart sounds  No murmur  Pulmonary:      Effort: Pulmonary effort is normal       Breath sounds: Normal breath sounds  No wheezing or rales  Abdominal:      General: Abdomen is flat  Bowel sounds are normal       Palpations: Abdomen is soft  There is no mass  Tenderness: There is no abdominal tenderness  There is no rebound  Musculoskeletal: Normal range of motion  Lymphadenopathy:      Cervical: No cervical adenopathy  Skin:     General: Skin is warm  Neurological:      General: No focal deficit present  Mental Status: He is alert and oriented to person, place, and time  Cranial Nerves: No cranial nerve deficit  Deep Tendon Reflexes: Reflexes normal    Psychiatric:         Mood and Affect: Mood normal          Behavior: Behavior normal          Thought Content: Thought content normal          Judgment: Judgment normal           Ryan Mora PA-C     BMI Counseling: Body mass index is 30 3 kg/m²  The BMI is above normal  Nutrition recommendations include reducing portion sizes    [place]

## 2021-05-28 NOTE — PATIENT INSTRUCTIONS

## 2021-06-07 ENCOUNTER — OFFICE VISIT (OUTPATIENT)
Dept: FAMILY MEDICINE CLINIC | Facility: CLINIC | Age: 41
End: 2021-06-07
Payer: COMMERCIAL

## 2021-06-07 ENCOUNTER — APPOINTMENT (OUTPATIENT)
Dept: LAB | Facility: CLINIC | Age: 41
End: 2021-06-07
Payer: COMMERCIAL

## 2021-06-07 VITALS
HEART RATE: 70 BPM | RESPIRATION RATE: 18 BRPM | WEIGHT: 215.3 LBS | SYSTOLIC BLOOD PRESSURE: 120 MMHG | TEMPERATURE: 98.6 F | DIASTOLIC BLOOD PRESSURE: 78 MMHG | BODY MASS INDEX: 30.14 KG/M2 | HEIGHT: 71 IN

## 2021-06-07 DIAGNOSIS — Z00.00 ANNUAL PHYSICAL EXAM: ICD-10-CM

## 2021-06-07 DIAGNOSIS — R31.0 GROSS HEMATURIA: Primary | ICD-10-CM

## 2021-06-07 DIAGNOSIS — R39.9 URINARY SYMPTOM OR SIGN: ICD-10-CM

## 2021-06-07 LAB
ALBUMIN SERPL BCP-MCNC: 4.5 G/DL (ref 3.5–5)
ALP SERPL-CCNC: 94 U/L (ref 46–116)
ALT SERPL W P-5'-P-CCNC: 94 U/L (ref 12–78)
ANION GAP SERPL CALCULATED.3IONS-SCNC: 6 MMOL/L (ref 4–13)
AST SERPL W P-5'-P-CCNC: 69 U/L (ref 5–45)
BASOPHILS # BLD AUTO: 0.03 THOUSANDS/ΜL (ref 0–0.1)
BASOPHILS NFR BLD AUTO: 1 % (ref 0–1)
BILIRUB SERPL-MCNC: 1.25 MG/DL (ref 0.2–1)
BUN SERPL-MCNC: 12 MG/DL (ref 5–25)
CALCIUM SERPL-MCNC: 9.4 MG/DL (ref 8.3–10.1)
CHLORIDE SERPL-SCNC: 110 MMOL/L (ref 100–108)
CHOLEST SERPL-MCNC: 197 MG/DL (ref 50–200)
CO2 SERPL-SCNC: 27 MMOL/L (ref 21–32)
CREAT SERPL-MCNC: 1.05 MG/DL (ref 0.6–1.3)
EOSINOPHIL # BLD AUTO: 0.02 THOUSAND/ΜL (ref 0–0.61)
EOSINOPHIL NFR BLD AUTO: 0 % (ref 0–6)
ERYTHROCYTE [DISTWIDTH] IN BLOOD BY AUTOMATED COUNT: 13.2 % (ref 11.6–15.1)
GFR SERPL CREATININE-BSD FRML MDRD: 87 ML/MIN/1.73SQ M
GLUCOSE P FAST SERPL-MCNC: 85 MG/DL (ref 65–99)
HCT VFR BLD AUTO: 44.2 % (ref 36.5–49.3)
HDLC SERPL-MCNC: 62 MG/DL
HGB BLD-MCNC: 15 G/DL (ref 12–17)
IMM GRANULOCYTES # BLD AUTO: 0.02 THOUSAND/UL (ref 0–0.2)
IMM GRANULOCYTES NFR BLD AUTO: 0 % (ref 0–2)
LDLC SERPL CALC-MCNC: 123 MG/DL (ref 0–100)
LYMPHOCYTES # BLD AUTO: 1 THOUSANDS/ΜL (ref 0.6–4.47)
LYMPHOCYTES NFR BLD AUTO: 18 % (ref 14–44)
MCH RBC QN AUTO: 30.2 PG (ref 26.8–34.3)
MCHC RBC AUTO-ENTMCNC: 33.9 G/DL (ref 31.4–37.4)
MCV RBC AUTO: 89 FL (ref 82–98)
MONOCYTES # BLD AUTO: 0.34 THOUSAND/ΜL (ref 0.17–1.22)
MONOCYTES NFR BLD AUTO: 6 % (ref 4–12)
NEUTROPHILS # BLD AUTO: 4.06 THOUSANDS/ΜL (ref 1.85–7.62)
NEUTS SEG NFR BLD AUTO: 75 % (ref 43–75)
NRBC BLD AUTO-RTO: 0 /100 WBCS
PLATELET # BLD AUTO: 294 THOUSANDS/UL (ref 149–390)
PMV BLD AUTO: 11.2 FL (ref 8.9–12.7)
POTASSIUM SERPL-SCNC: 3.9 MMOL/L (ref 3.5–5.3)
PROT SERPL-MCNC: 7.5 G/DL (ref 6.4–8.2)
RBC # BLD AUTO: 4.97 MILLION/UL (ref 3.88–5.62)
SL AMB  POCT GLUCOSE, UA: NEGATIVE
SL AMB LEUKOCYTE ESTERASE,UA: NEGATIVE
SL AMB POCT BILIRUBIN,UA: NEGATIVE
SL AMB POCT BLOOD,UA: ABNORMAL
SL AMB POCT CLARITY,UA: ABNORMAL
SL AMB POCT COLOR,UA: YELLOW
SL AMB POCT KETONES,UA: NEGATIVE
SL AMB POCT NITRITE,UA: NEGATIVE
SL AMB POCT PH,UA: 5
SL AMB POCT SPECIFIC GRAVITY,UA: 1.03
SL AMB POCT URINE PROTEIN: NEGATIVE
SL AMB POCT UROBILINOGEN: 0.2
SODIUM SERPL-SCNC: 143 MMOL/L (ref 136–145)
TRIGL SERPL-MCNC: 60 MG/DL
TSH SERPL DL<=0.05 MIU/L-ACNC: 2.78 UIU/ML (ref 0.36–3.74)
WBC # BLD AUTO: 5.47 THOUSAND/UL (ref 4.31–10.16)

## 2021-06-07 PROCEDURE — 99214 OFFICE O/P EST MOD 30 MIN: CPT | Performed by: PHYSICIAN ASSISTANT

## 2021-06-07 PROCEDURE — 3008F BODY MASS INDEX DOCD: CPT | Performed by: PHYSICIAN ASSISTANT

## 2021-06-07 PROCEDURE — 81002 URINALYSIS NONAUTO W/O SCOPE: CPT | Performed by: PHYSICIAN ASSISTANT

## 2021-06-07 PROCEDURE — 84443 ASSAY THYROID STIM HORMONE: CPT

## 2021-06-07 PROCEDURE — 80053 COMPREHEN METABOLIC PANEL: CPT

## 2021-06-07 PROCEDURE — 85025 COMPLETE CBC W/AUTO DIFF WBC: CPT

## 2021-06-07 PROCEDURE — 1036F TOBACCO NON-USER: CPT | Performed by: PHYSICIAN ASSISTANT

## 2021-06-07 PROCEDURE — 36415 COLL VENOUS BLD VENIPUNCTURE: CPT

## 2021-06-07 PROCEDURE — 80061 LIPID PANEL: CPT

## 2021-06-07 NOTE — PROGRESS NOTES
Assessment/Plan:    1  Gross hematuria    - painless gross hematuria, will send urine for culture, check CMP, order u/s kidney and bladder, stressed the importance of hydration with WATER, call if any changes  - US kidney and bladder; Future    F/u as needed    Subjective:   Chief Complaint   Patient presents with    Blood in Urine      Patient ID: Garett Jeffrey is a 41 y o  male  Patricia Cohen went to urinate and urine looked like coca cola, woke up Sunday morning urinated twice normally, went out side did some work and urine coca cola again at 2 pm  Before dinner at 6:30 pm and was light again but brown  Notes he wiped his penis with toilette paper and thought that looked like blood on the white paper  This morning light urine  No pain  Denies fever, chills, dysuria, abdominal pain, flank pain, nausea, vomiting, diarrhea, constipation  No history of stones  Althought both mom and dad get kidney stones  No history of liver disease      The following portions of the patient's history were reviewed and updated as appropriate: allergies, current medications, past family history, past medical history, past social history, past surgical history and problem list     Past Medical History:   Diagnosis Date    Cholelithiasis     GERD (gastroesophageal reflux disease)     Herniation of intervertebral disc of lumbar spine     IBS (irritable bowel syndrome)      Past Surgical History:   Procedure Laterality Date    COLONOSCOPY      [place] ESOPHAGOGASTRODUODENOSCOPY TRANSORAL DIAGNOSTIC N/A 5/2/2019    Procedure: ESOPHAGOGASTRODUODENOSCOPY (EGD); Surgeon: Tamra Daugherty MD;  [facility];   Service: Gastroenterology    [place] LAP,CHOLECYSTECTOMY N/A 1/9/2017    Procedure: Harrison Luna;  Surgeon: Zahraa Heller MD;  [facility];  Service: General    UPPER GASTROINTESTINAL ENDOSCOPY      WISDOM TOOTH EXTRACTION       Family History   Problem Relation Age of Onset    Nephrolithiasis Mother     Arthritis Mother     Hyperlipidemia Mother     Heart attack Father     Hypertension Father     Stroke Father     Sudden death Father     Hyperlipidemia Father     Coronary artery disease Maternal Grandfather     Alcohol abuse Neg Hx     Substance Abuse Neg Hx     Mental illness Neg Hx      Social History     Socioeconomic History    Marital status: Single     Spouse name: Not on file    Number of children: Not on file    Years of education: Not on file    Highest education level: Not on file   Occupational History    Not on file   Social Needs    Financial resource strain: Not on file    Food insecurity     Worry: Not on file     Inability: Not on file    Transportation needs     Medical: Not on file     Non-medical: Not on file   Tobacco Use    Smoking status: Never Smoker    Smokeless tobacco: Never Used   Substance and Sexual Activity    Alcohol use: No    Drug use: No    Sexual activity: Not on file   Lifestyle    Physical activity     Days per week: Not on file     Minutes per session: Not on file    Stress: Not on file   Relationships    Social connections     Talks on phone: Not on file     Gets together: Not on file     Attends Restoration service: Not on file     Active member of club or organization: Not on file     Attends meetings of clubs or organizations: Not on file     Relationship status: Not on file    Intimate partner violence     Fear of current or ex partner: Not on file     Emotionally abused: Not on file     Physically abused: Not on file     Forced sexual activity: Not on file   Other Topics Concern    Not on file   Social History Narrative    Not on file       Current Outpatient Medications:     Loperamide HCl (IMODIUM PO), Take by mouth, Disp: , Rfl:     Review of Systems          Objective:    Vitals:    06/07/21 1044   BP: 120/78   BP Location: Left arm   Patient Position: Sitting   Cuff Size: Standard   Pulse: 70   Resp: 18   Temp: 98 6 °F (37 °C) TempSrc: Oral   Weight: 97 7 kg (215 lb 4 8 oz)   Height: 5' 10 75" (1 797 m)        Physical Exam  Constitutional:       Appearance: Normal appearance  Cardiovascular:      Rate and Rhythm: Normal rate and regular rhythm  Pulses: Normal pulses  Heart sounds: Normal heart sounds  Pulmonary:      Effort: Pulmonary effort is normal       Breath sounds: Normal breath sounds  Neurological:      General: No focal deficit present  Mental Status: He is alert and oriented to person, place, and time  Psychiatric:         Mood and Affect: Mood normal          Behavior: Behavior normal          Thought Content:  Thought content normal          Judgment: Judgment normal            Office Visit on 06/07/2021   Component Date Value Ref Range Status    LEUKOCYTE ESTERASE,UA 06/07/2021 NEGATIVE   Final    NITRITE,UA 06/07/2021 NEGATIVE   Final    SL AMB POCT UROBILINOGEN 06/07/2021 0 2   Final    POCT URINE PROTEIN 06/07/2021 NEGATIVE   Final     PH,UA 06/07/2021 5 0   Final    BLOOD,UA 06/07/2021 LARGE   Final    SPECIFIC GRAVITY,UA 06/07/2021 1 030   Final    KETONES,UA 06/07/2021 NEGATIVE   Final    BILIRUBIN,UA 06/07/2021 NEGATIVE   Final    GLUCOSE, UA 06/07/2021 NEGATIVE   Final     COLOR,UA 06/07/2021 YELLOW   Final    CLARITY,UA 06/07/2021 DARK   Final   ]

## 2021-06-09 ENCOUNTER — TELEPHONE (OUTPATIENT)
Dept: FAMILY MEDICINE CLINIC | Facility: CLINIC | Age: 41
End: 2021-06-09

## 2021-06-09 ENCOUNTER — HOSPITAL ENCOUNTER (OUTPATIENT)
Dept: ULTRASOUND IMAGING | Facility: HOSPITAL | Age: 41
Discharge: HOME | End: 2021-06-09
Payer: COMMERCIAL

## 2021-06-09 DIAGNOSIS — R79.89 ELEVATED LFTS: Primary | ICD-10-CM

## 2021-06-09 DIAGNOSIS — R31.0 GROSS HEMATURIA: ICD-10-CM

## 2021-06-09 PROCEDURE — 76770 US EXAM ABDO BACK WALL COMP: CPT

## 2021-06-09 NOTE — TELEPHONE ENCOUNTER
Patient was informed  He states he feels good and he is noticing that 1 out of 4 urines are coming back dark   MRP

## 2021-06-09 NOTE — TELEPHONE ENCOUNTER
----- Message from Emiliano France PA-C sent at 6/9/2021  1:34 PM EDT -----  Please let patient know his liver function tests were slightly elevated, his urine culture was negative for bacteria  I want him to go to get an u/s of his liver  How is his urine? Any more symptoms?

## 2021-06-10 ENCOUNTER — TELEPHONE (OUTPATIENT)
Dept: FAMILY MEDICINE CLINIC | Facility: CLINIC | Age: 41
End: 2021-06-10

## 2021-06-10 NOTE — TELEPHONE ENCOUNTER
----- Message from Ryan Mora PA-C sent at 6/9/2021  8:14 PM EDT -----  Please let patient know there is no stone   Needs to get u/s liver done

## 2021-06-14 ENCOUNTER — HOSPITAL ENCOUNTER (OUTPATIENT)
Dept: ULTRASOUND IMAGING | Facility: HOSPITAL | Age: 41
Discharge: HOME | End: 2021-06-14
Payer: COMMERCIAL

## 2021-06-14 DIAGNOSIS — R79.89 ELEVATED LFTS: ICD-10-CM

## 2021-06-14 PROCEDURE — 76705 ECHO EXAM OF ABDOMEN: CPT

## 2021-06-15 ENCOUNTER — TELEPHONE (OUTPATIENT)
Dept: FAMILY MEDICINE CLINIC | Facility: CLINIC | Age: 41
End: 2021-06-15

## 2021-06-15 NOTE — TELEPHONE ENCOUNTER
Lmom regarding decreasing his coffee and soda intake, along with increasing his water intake for a week to see if it helps with the polyuria and urgency  I also gave him the number to [facility] for Urology   MRP

## 2021-06-15 NOTE — TELEPHONE ENCOUNTER
Patient was informed  He states that he is still experiencing polyuria and urgency  He also states that he will make an appointment with GI   MRP

## 2021-06-15 NOTE — TELEPHONE ENCOUNTER
See me please about this  Thanks  Nutrition Assessment warranted for length of stay on 8ICU.  Information obtained from: patient, medical record  Per chart: "76 y.o. F with PMHx of HTN, HLD, lung Ca s/p lobectomy now s/p L2- L5 laminectomy and fusion admitted to SICU for hemodynamic monitoring."

## 2021-06-15 NOTE — TELEPHONE ENCOUNTER
----- Message from [place] Louise PA-C sent at 6/15/2021 12:20 PM EDT -----  Please call patient  Let him know his ultrasound only shows mild fatty liver  Is he still having the urinary symptoms? If so due to that, along with the elevated LFT and mild fatty liver I would like him to see GI for further evaluation

## 2021-06-22 ENCOUNTER — TELEPHONE (OUTPATIENT)
Dept: FAMILY MEDICINE CLINIC | Facility: CLINIC | Age: 41
End: 2021-06-22

## 2021-06-22 NOTE — TELEPHONE ENCOUNTER
Patient wanted you to know that he passed a kidney stone, he has n fever and feels great since that happened   He wants to know what to do next if anything    [phone]

## 2021-06-22 NOTE — TELEPHONE ENCOUNTER
I would just repeat the urine in 2-3 weeks to make sure the urine is normal now  Did he keep the stone? He can bring it in for analyzation  He should still see GI due to elevated LFT  Thanks

## 2021-07-08 ENCOUNTER — CLINICAL SUPPORT (OUTPATIENT)
Dept: FAMILY MEDICINE CLINIC | Facility: CLINIC | Age: 41
End: 2021-07-08

## 2021-07-08 DIAGNOSIS — N20.0 KIDNEY STONE: Primary | ICD-10-CM

## 2021-07-08 DIAGNOSIS — R31.0 GROSS HEMATURIA: ICD-10-CM

## 2021-07-11 LAB
APPEARANCE UR: CLEAR
BILIRUB UR QL STRIP: NEGATIVE
COLOR UR: YELLOW
GLUCOSE UR QL STRIP: NEGATIVE
HGB UR QL STRIP: NEGATIVE
KETONES UR QL STRIP: NEGATIVE
LEUKOCYTE ESTERASE UR QL STRIP: NEGATIVE
NITRITE UR QL STRIP: NEGATIVE
ORIGIN STONE: NORMAL
PH UR STRIP: 5.5 [PH] (ref 5–8)
PROT UR QL STRIP: NEGATIVE
SP GR UR STRIP: 1.02 (ref 1–1.03)
WT STONE: 0.01 G

## 2021-07-14 NOTE — PROGRESS NOTES
7/15/2021    Danielle Finn  1980  [id number]      Assessment  -Nephrolithiasis     Discussion/Plan  Miguel Navarrete is a 41 y o  male who presents in consultation    1  Nephrolithiasis-   We reviewed the results of his recent stone analysis which identified calcium oxalate  Discussed that cause of his recent urinary symptoms were likely secondary to spontaneously passed stone  He is currently asymptomatic  Urine dip in the office today appears negative infection  Review dietary recommendations for preventing future stone episodes  Patient consumes large quantities of [employer]  Recent renal ultrasound showed no evidence of hydronephrosis or additional renal calculi  Prostate cancer screening can begin age 52 years following AUA guidelines  He will follow up with our office on as-needed basis  Patient was instructed to call with any issues      -All questions answered, patient agrees with plan      History of Present Illness  41 y o  male  Who presents in consultation today for evaluation of nephrolithiasis  He was referred by his PCP  Patient initially experienced one episode of painless gross hematuria in early June  Renal ultrasound was performed which was unremarkable, however patient states he spontaneously passed a stone on 06/22/2021  This was sent for stone analysis and confirmed  Patient states he has a strong family history of kidney stones  Both his mother and father suffered from nephrolithiasis  He has been asymptomatic since passing stone  Patient states he drinks at least 3-4 cans of Coca-[employer] daily  He denies any additional urologic history  Patient denies any strong family history of prostate or urologic malignancy  Review of Systems  Review of Systems   Constitutional: Negative  HENT: Negative  Respiratory: Negative  Cardiovascular: Negative  Gastrointestinal: Negative      Genitourinary: Negative for decreased urine volume, difficulty urinating, dysuria, flank pain, frequency, hematuria and urgency  Musculoskeletal: Negative  Skin: Negative  Neurological: Negative  Psychiatric/Behavioral: Negative  AUA SYMPTOM SCORE      Most Recent Value   AUA SYMPTOM SCORE   How often have you had a sensation of not emptying your bladder completely after you finished urinating? 2   How often have you had to urinate again less than two hours after you finished urinating? 3   How often have you found you stopped and started again several times when you urinate?  0   How often have you found it difficult to postpone urination? 0   How often have you had a weak urinary stream?  0   How often have you had to push or strain to begin urination? 0   How many times did you most typically get up to urinate from the time you went to bed at night until the time you got up in the morning? 1   Quality of Life: If you were to spend the rest of your life with your urinary condition just the way it is now, how would you feel about that?  2   AUA SYMPTOM SCORE  6          Past Medical History  Past Medical History:   Diagnosis Date    Cholelithiasis     GERD (gastroesophageal reflux disease)     Herniation of intervertebral disc of lumbar spine     IBS (irritable bowel syndrome)        Past Social History  Past Surgical History:   Procedure Laterality Date    COLONOSCOPY      [place] ESOPHAGOGASTRODUODENOSCOPY TRANSORAL DIAGNOSTIC N/A 5/2/2019    Procedure: ESOPHAGOGASTRODUODENOSCOPY (EGD); Surgeon: Jonathon De Santiago MD;  [facility];   Service: Gastroenterology    [place] LAP,CHOLECYSTECTOMY N/A 1/9/2017    Procedure: Sharonda Yoon;  Surgeon: Lori Monroy MD;  Location: [facility] OR;  Service: General    UPPER GASTROINTESTINAL ENDOSCOPY      WISDOM TOOTH EXTRACTION         Past Family History  Family History   Problem Relation Age of Onset    Nephrolithiasis Mother     Arthritis Mother     Hyperlipidemia Mother     Heart attack Father    Hal Moss Hypertension Father     Stroke Father    Jcarlos Ragnel Sudden death Father     Hyperlipidemia Father     Coronary artery disease Maternal Grandfather     Alcohol abuse Neg Hx     Substance Abuse Neg Hx     Mental illness Neg Hx        Past Social history  Social History     Socioeconomic History    Marital status: Single     Spouse name: Not on file    Number of children: Not on file    Years of education: Not on file    Highest education level: Not on file   Occupational History    Not on file   Tobacco Use    Smoking status: Never Smoker    Smokeless tobacco: Never Used   Vaping Use    Vaping Use: Never used   Substance and Sexual Activity    Alcohol use: No    Drug use: No    Sexual activity: Not on file   Other Topics Concern    Not on file   Social History Narrative    Not on file     Social Determinants of Health     Financial Resource Strain:     Difficulty of Paying Living Expenses:    Food Insecurity:     Worried About Running Out of Food in the Last Year:     Ran Out of Food in the Last Year:    Transportation Needs:     Lack of Transportation (Medical):  Lack of Transportation (Non-Medical):    Physical Activity:     Days of Exercise per Week:     Minutes of Exercise per Session:    Stress:     Feeling of Stress :    Social Connections:     Frequency of Communication with Friends and Family:     Frequency of Social Gatherings with Friends and Family:     Attends Orthodoxy Services:     Active Member of Clubs or Organizations:     Attends Club or Organization Meetings:     Marital Status:    Intimate Partner Violence:     Fear of Current or Ex-Partner:     Emotionally Abused:     Physically Abused:     Sexually Abused:        Current Medications  Current Outpatient Medications   Medication Sig Dispense Refill    Loperamide HCl (IMODIUM PO) Take by mouth       No current facility-administered medications for this visit         Allergies  Allergies   Allergen Reactions    Other Annotation - [id number]: poison ivy/oak       Past Medical History, Social History, Family History, medications and allergies were reviewed  Vitals  Vitals:    07/15/21 1259   BP: 118/80   BP Location: Left arm   Patient Position: Sitting   Cuff Size: Adult   Pulse: 80   Weight: 96 6 kg (213 lb)   Height: 5' 10 75" (1 797 m)       Physical Exam  Physical Exam  Constitutional:       Appearance: Normal appearance  He is well-developed  HENT:      Head: Normocephalic  Eyes:      Pupils: Pupils are equal, round, and reactive to light  Pulmonary:      Effort: Pulmonary effort is normal    Abdominal:      Palpations: Abdomen is soft  Tenderness: There is no right CVA tenderness or left CVA tenderness  Musculoskeletal:         General: Normal range of motion  Cervical back: Normal range of motion  Skin:     General: Skin is warm and dry  Neurological:      General: No focal deficit present  Mental Status: He is alert and oriented to person, place, and time  Psychiatric:         Mood and Affect: Mood normal          Behavior: Behavior normal          Thought Content: Thought content normal          Judgment: Judgment normal          Results    I have personally reviewed all pertinent lab results and reviewed with patient  No results found for: PSA  Lab Results   Component Value Date    CALCIUM 9 4 06/07/2021    K 3 9 06/07/2021    CO2 27 06/07/2021     (H) 06/07/2021    BUN 12 06/07/2021    CREATININE 1 05 06/07/2021     Lab Results   Component Value Date    WBC 5 47 06/07/2021    HGB 15 0 06/07/2021    HCT 44 2 06/07/2021    MCV 89 06/07/2021     06/07/2021     No results found for this or any previous visit (from the past 1 hour(s))

## 2021-07-15 ENCOUNTER — OFFICE VISIT (OUTPATIENT)
Dept: UROLOGY | Facility: HOSPITAL | Age: 41
End: 2021-07-15
Payer: COMMERCIAL

## 2021-07-15 VITALS
WEIGHT: 213 LBS | HEART RATE: 80 BPM | SYSTOLIC BLOOD PRESSURE: 118 MMHG | DIASTOLIC BLOOD PRESSURE: 80 MMHG | HEIGHT: 71 IN | BODY MASS INDEX: 29.82 KG/M2

## 2021-07-15 DIAGNOSIS — N20.0 NEPHROLITHIASIS: Primary | ICD-10-CM

## 2021-07-15 PROCEDURE — 99244 OFF/OP CNSLTJ NEW/EST MOD 40: CPT | Performed by: NURSE PRACTITIONER

## 2021-07-15 PROCEDURE — 3008F BODY MASS INDEX DOCD: CPT | Performed by: NURSE PRACTITIONER

## 2021-10-04 ENCOUNTER — OFFICE VISIT (OUTPATIENT)
Dept: GASTROENTEROLOGY | Facility: CLINIC | Age: 41
End: 2021-10-04
Payer: COMMERCIAL

## 2021-10-04 VITALS
HEIGHT: 71 IN | DIASTOLIC BLOOD PRESSURE: 80 MMHG | BODY MASS INDEX: 29.18 KG/M2 | SYSTOLIC BLOOD PRESSURE: 118 MMHG | WEIGHT: 208.4 LBS

## 2021-10-04 DIAGNOSIS — R79.89 LFT ELEVATION: Primary | ICD-10-CM

## 2021-10-04 DIAGNOSIS — K21.9 GASTROESOPHAGEAL REFLUX DISEASE WITHOUT ESOPHAGITIS: ICD-10-CM

## 2021-10-04 DIAGNOSIS — K58.0 IRRITABLE BOWEL SYNDROME WITH DIARRHEA: ICD-10-CM

## 2021-10-04 PROCEDURE — 99214 OFFICE O/P EST MOD 30 MIN: CPT | Performed by: INTERNAL MEDICINE

## 2021-10-04 PROCEDURE — 3008F BODY MASS INDEX DOCD: CPT | Performed by: INTERNAL MEDICINE

## 2021-10-04 PROCEDURE — 1036F TOBACCO NON-USER: CPT | Performed by: INTERNAL MEDICINE

## 2021-10-05 PROBLEM — R79.89 LFT ELEVATION: Status: ACTIVE | Noted: 2021-10-05

## 2021-10-07 ENCOUNTER — IMMUNIZATIONS (OUTPATIENT)
Dept: FAMILY MEDICINE CLINIC | Facility: CLINIC | Age: 41
End: 2021-10-07
Payer: COMMERCIAL

## 2021-10-07 DIAGNOSIS — Z23 ENCOUNTER FOR IMMUNIZATION: Primary | ICD-10-CM

## 2021-10-07 PROCEDURE — 90471 IMMUNIZATION ADMIN: CPT

## 2021-10-07 PROCEDURE — 90686 IIV4 VACC NO PRSV 0.5 ML IM: CPT

## 2021-10-18 ENCOUNTER — LAB (OUTPATIENT)
Dept: LAB | Facility: CLINIC | Age: 41
End: 2021-10-18
Payer: COMMERCIAL

## 2021-10-18 DIAGNOSIS — R79.89 LFT ELEVATION: ICD-10-CM

## 2021-10-18 LAB
ALBUMIN SERPL BCP-MCNC: 4.1 G/DL (ref 3.5–5)
ALP SERPL-CCNC: 75 U/L (ref 46–116)
ALT SERPL W P-5'-P-CCNC: 45 U/L (ref 12–78)
AST SERPL W P-5'-P-CCNC: 19 U/L (ref 5–45)
BILIRUB DIRECT SERPL-MCNC: 0.21 MG/DL (ref 0–0.2)
BILIRUB SERPL-MCNC: 0.75 MG/DL (ref 0.2–1)
FERRITIN SERPL-MCNC: 180 NG/ML (ref 8–388)
HBV CORE AB SER QL: NORMAL
HBV CORE IGM SER QL: NORMAL
HBV SURFACE AG SER QL: NORMAL
HCV AB SER QL: NORMAL
IRON SATN MFR SERPL: 23 % (ref 20–50)
IRON SERPL-MCNC: 75 UG/DL (ref 65–175)
PROT SERPL-MCNC: 7.4 G/DL (ref 6.4–8.2)
TIBC SERPL-MCNC: 328 UG/DL (ref 250–450)

## 2021-10-18 PROCEDURE — 86235 NUCLEAR ANTIGEN ANTIBODY: CPT

## 2021-10-18 PROCEDURE — 83010 ASSAY OF HAPTOGLOBIN QUANT: CPT

## 2021-10-18 PROCEDURE — 83550 IRON BINDING TEST: CPT

## 2021-10-18 PROCEDURE — 86705 HEP B CORE ANTIBODY IGM: CPT

## 2021-10-18 PROCEDURE — 86038 ANTINUCLEAR ANTIBODIES: CPT

## 2021-10-18 PROCEDURE — 82728 ASSAY OF FERRITIN: CPT

## 2021-10-18 PROCEDURE — 82977 ASSAY OF GGT: CPT

## 2021-10-18 PROCEDURE — 86803 HEPATITIS C AB TEST: CPT

## 2021-10-18 PROCEDURE — 87340 HEPATITIS B SURFACE AG IA: CPT

## 2021-10-18 PROCEDURE — 83883 ASSAY NEPHELOMETRY NOT SPEC: CPT

## 2021-10-18 PROCEDURE — 36415 COLL VENOUS BLD VENIPUNCTURE: CPT

## 2021-10-18 PROCEDURE — 82247 BILIRUBIN TOTAL: CPT

## 2021-10-18 PROCEDURE — 86704 HEP B CORE ANTIBODY TOTAL: CPT

## 2021-10-18 PROCEDURE — 80076 HEPATIC FUNCTION PANEL: CPT

## 2021-10-18 PROCEDURE — 82172 ASSAY OF APOLIPOPROTEIN: CPT

## 2021-10-18 PROCEDURE — 84460 ALANINE AMINO (ALT) (SGPT): CPT

## 2021-10-18 PROCEDURE — 83540 ASSAY OF IRON: CPT

## 2021-10-19 LAB
ACTIN IGG SERPL-ACNC: 11 UNITS (ref 0–19)
RYE IGE QN: NEGATIVE

## 2021-10-21 LAB
A2 MACROGLOB SERPL-MCNC: 142 MG/DL (ref 110–276)
ALT SERPL W P-5'-P-CCNC: 39 IU/L (ref 0–55)
APO A-I SERPL-MCNC: 131 MG/DL (ref 101–178)
BILIRUB SERPL-MCNC: 0.5 MG/DL (ref 0–1.2)
COMMENT: ABNORMAL
FIBROSIS SCORING:: ABNORMAL
FIBROSIS STAGE SERPL QL: ABNORMAL
GGT SERPL-CCNC: 99 IU/L (ref 0–65)
HAPTOGLOB SERPL-MCNC: 39 MG/DL (ref 23–355)
INTERPRETATIONS: ABNORMAL
LIVER FIBR SCORE SERPL CALC.FIBROSURE: 0.33 (ref 0–0.21)
NECROINFLAMM ACTIVITY SCORING:: ABNORMAL
NECROINFLAMMATORY ACT GRADE SERPL QL: ABNORMAL
NECROINFLAMMATORY ACT SCORE SERPL: 0.22 (ref 0–0.17)
SERVICE CMNT-IMP: ABNORMAL

## 2021-11-05 ENCOUNTER — HOSPITAL ENCOUNTER (OUTPATIENT)
Dept: ULTRASOUND IMAGING | Facility: HOSPITAL | Age: 41
Discharge: HOME | End: 2021-11-05
Attending: INTERNAL MEDICINE
Payer: COMMERCIAL

## 2021-11-05 DIAGNOSIS — R79.89 LFT ELEVATION: ICD-10-CM

## 2021-11-05 PROCEDURE — 76981 USE PARENCHYMA: CPT

## 2022-03-03 DIAGNOSIS — R79.89 LFT ELEVATION: Primary | ICD-10-CM

## 2022-04-08 ENCOUNTER — LAB (OUTPATIENT)
Dept: LAB | Facility: CLINIC | Age: 42
End: 2022-04-08
Payer: COMMERCIAL

## 2022-04-08 DIAGNOSIS — R79.89 LFT ELEVATION: ICD-10-CM

## 2022-04-08 LAB
ALBUMIN SERPL BCP-MCNC: 4.4 G/DL (ref 3.5–5)
ALP SERPL-CCNC: 76 U/L (ref 46–116)
ALT SERPL W P-5'-P-CCNC: 54 U/L (ref 12–78)
AST SERPL W P-5'-P-CCNC: 26 U/L (ref 5–45)
BILIRUB DIRECT SERPL-MCNC: 0.18 MG/DL (ref 0–0.2)
BILIRUB SERPL-MCNC: 1.14 MG/DL (ref 0.2–1)
PROT SERPL-MCNC: 7.4 G/DL (ref 6.4–8.2)

## 2022-04-08 PROCEDURE — 36415 COLL VENOUS BLD VENIPUNCTURE: CPT

## 2022-04-08 PROCEDURE — 80076 HEPATIC FUNCTION PANEL: CPT

## 2022-05-26 ENCOUNTER — RA CDI HCC (OUTPATIENT)
Dept: OTHER | Facility: HOSPITAL | Age: 42
End: 2022-05-26

## 2022-05-26 NOTE — PROGRESS NOTES
[facility]  coding opportunities       Chart reviewed, no opportunity found: CHART REVIEWED, NO OPPORTUNITY FOUND        Patients Insurance        Commercial Insurance: [address] [place] [address]

## 2022-06-03 ENCOUNTER — OFFICE VISIT (OUTPATIENT)
Dept: FAMILY MEDICINE CLINIC | Facility: CLINIC | Age: 42
End: 2022-06-03
Payer: COMMERCIAL

## 2022-06-03 VITALS
WEIGHT: 219.1 LBS | OXYGEN SATURATION: 98 % | BODY MASS INDEX: 30.67 KG/M2 | HEIGHT: 71 IN | SYSTOLIC BLOOD PRESSURE: 110 MMHG | HEART RATE: 70 BPM | DIASTOLIC BLOOD PRESSURE: 78 MMHG | RESPIRATION RATE: 15 BRPM

## 2022-06-03 DIAGNOSIS — E78.2 MIXED HYPERLIPIDEMIA: ICD-10-CM

## 2022-06-03 DIAGNOSIS — Z00.00 ANNUAL PHYSICAL EXAM: Primary | ICD-10-CM

## 2022-06-03 DIAGNOSIS — R79.89 LFT ELEVATION: ICD-10-CM

## 2022-06-03 PROCEDURE — 3725F SCREEN DEPRESSION PERFORMED: CPT | Performed by: PHYSICIAN ASSISTANT

## 2022-06-03 PROCEDURE — 99396 PREV VISIT EST AGE 40-64: CPT | Performed by: PHYSICIAN ASSISTANT

## 2022-06-03 NOTE — PATIENT INSTRUCTIONS

## 2022-06-03 NOTE — PROGRESS NOTES
ADULT ANNUAL PHYSICAL  [facility] PR[facility]    NAME: Naz Peacock  AGE: 35 y o  SEX: male  : 1980     DATE: 6/3/2022     Assessment and Plan:     Healthy 35year old male    Immunizations and preventive care screenings were discussed with patient today  Appropriate education was printed on patient's after visit summary  Counseling:  Alcohol/drug use: discussed moderation in alcohol intake, the recommendations for healthy alcohol use, and avoidance of illicit drug use  Dental Health: discussed importance of regular tooth brushing, flossing, and dental visits  Injury prevention: discussed safety/seat belts, safety helmets, smoke detectors, carbon dioxide detectors, and smoking near bedding or upholstery  Sexual health: discussed sexually transmitted diseases, partner selection, use of condoms, avoidance of unintended pregnancy, and contraceptive alternatives  · Exercise: the importance of regular exercise/physical activity was discussed  Recommend exercise 3-5 times per week for at least 30 minutes  Return in 1 year (on 6/3/2023)  Chief Complaint:     Chief Complaint   Patient presents with    Physical Exam      History of Present Illness:     Adult Annual Physical   Patient here for a comprehensive physical exam  The patient reports no problems  Diet and Physical Activity  · Diet/Nutrition: well balanced diet  · Exercise: no formal exercise  Depression Screening  PHQ-2/9 Depression Screening    Little interest or pleasure in doing things: 0 - not at all  Feeling down, depressed, or hopeless: 0 - not at all  PHQ-2 Score: 0  PHQ-2 Interpretation: Negative depression screen       General Health  · Sleep: sleeps well  · Hearing: normal - bilateral   · Vision: goes for regular eye exams and most recent eye exam >1 year ago  · Dental: regular dental visits and brushes teeth twice daily          Health  · Symptoms include: none     Review of Systems:     Review of Systems   Constitutional: Negative  HENT: Negative  Eyes: Negative  Respiratory: Negative  Cardiovascular: Negative  Gastrointestinal: Negative  Endocrine: Negative  Genitourinary: Negative  Musculoskeletal: Negative  Skin: Negative  Allergic/Immunologic: Negative  Neurological: Negative  Hematological: Negative  Psychiatric/Behavioral: Negative  Past Medical History:     Past Medical History:   Diagnosis Date    Cholelithiasis     GERD (gastroesophageal reflux disease)     Herniation of intervertebral disc of lumbar spine     IBS (irritable bowel syndrome)       Past Surgical History:     Past Surgical History:   Procedure Laterality Date    COLONOSCOPY      [place] ESOPHAGOGASTRODUODENOSCOPY TRANSORAL DIAGNOSTIC N/A 5/2/2019    Procedure: ESOPHAGOGASTRODUODENOSCOPY (EGD); Surgeon: Aleksandr Alaniz MD;  [facility];   Service: Gastroenterology    [place] LAP,CHOLECYSTECTOMY N/A 1/9/2017    Procedure: Bernardine Actis;  Surgeon: Carmen Frye MD;  Location: [facility];  Service: General    UPPER GASTROINTESTINAL ENDOSCOPY      WISDOM TOOTH EXTRACTION        Family History:     Family History   Problem Relation Age of Onset    Nephrolithiasis Mother     Arthritis Mother     Hyperlipidemia Mother     Heart attack Father     Hypertension Father     Stroke Father     Sudden death Father     Hyperlipidemia Father     Coronary artery disease Maternal Grandfather     Alcohol abuse Neg Hx     Substance Abuse Neg Hx     Mental illness Neg Hx     Colon cancer Neg Hx     Colon polyps Neg Hx       Social History:     Social History     Socioeconomic History    Marital status: Single     Spouse name: None    Number of children: None    Years of education: None    Highest education level: None   Occupational History    None   Tobacco Use    Smoking status: Never Smoker    Smokeless tobacco: Never Used   Vaping Use    Vaping Use: Never used   Substance and Sexual Activity    Alcohol use: No    Drug use: No    Sexual activity: None   Other Topics Concern    None   Social History Narrative    None     Social Determinants of Health     Financial Resource Strain: Not on file   Food Insecurity: Not on file   Transportation Needs: Not on file   Physical Activity: Not on file   Stress: Not on file   Social Connections: Not on file   Intimate Partner Violence: Not on file   Housing Stability: Not on file      Current Medications:     Current Outpatient Medications   Medication Sig Dispense Refill    Loperamide HCl (IMODIUM PO) Take by mouth       No current facility-administered medications for this visit  Allergies: Allergies   Allergen Reactions    Other      Annotation - [id number]: poison ivy/oak      Physical Exam:     /80   Pulse 70   Resp 15   Ht 5' 11" (1 803 m)   Wt 99 4 kg (219 lb 1 6 oz)   SpO2 98%   BMI 30 56 kg/m²     Physical Exam  Constitutional:       Appearance: Normal appearance  He is well-developed and normal weight  HENT:      Head: Normocephalic and atraumatic  Eyes:      Extraocular Movements: Extraocular movements intact  Conjunctiva/sclera: Conjunctivae normal       Pupils: Pupils are equal, round, and reactive to light  Neck:      Thyroid: No thyromegaly  Cardiovascular:      Rate and Rhythm: Normal rate and regular rhythm  Pulses: Normal pulses  Heart sounds: Normal heart sounds  No murmur heard  Pulmonary:      Effort: Pulmonary effort is normal       Breath sounds: Normal breath sounds  No wheezing or rales  Abdominal:      General: Bowel sounds are normal       Palpations: Abdomen is soft  There is no mass  Tenderness: There is no abdominal tenderness  There is no rebound  Musculoskeletal:         General: Normal range of motion  Cervical back: Normal range of motion and neck supple  Lymphadenopathy:      Cervical: No cervical adenopathy  Skin:     General: Skin is warm  Neurological:      General: No focal deficit present  Mental Status: He is alert and oriented to person, place, and time  Cranial Nerves: No cranial nerve deficit  Deep Tendon Reflexes: Reflexes normal    Psychiatric:         Mood and Affect: Mood normal          Behavior: Behavior normal          Thought Content:  Thought content normal          Judgment: Judgment normal           CRYSTAL Alva

## 2022-06-24 ENCOUNTER — OFFICE VISIT (OUTPATIENT)
Dept: FAMILY MEDICINE CLINIC | Facility: CLINIC | Age: 42
End: 2022-06-24
Payer: COMMERCIAL

## 2022-06-24 ENCOUNTER — TELEPHONE (OUTPATIENT)
Dept: OTHER | Facility: OTHER | Age: 42
End: 2022-06-24

## 2022-06-24 VITALS
SYSTOLIC BLOOD PRESSURE: 122 MMHG | DIASTOLIC BLOOD PRESSURE: 74 MMHG | RESPIRATION RATE: 18 BRPM | BODY MASS INDEX: 30.93 KG/M2 | HEIGHT: 71 IN | WEIGHT: 220.9 LBS | OXYGEN SATURATION: 97 % | HEART RATE: 80 BPM

## 2022-06-24 DIAGNOSIS — R10.32 LEFT GROIN PAIN: Primary | ICD-10-CM

## 2022-06-24 PROCEDURE — 99214 OFFICE O/P EST MOD 30 MIN: CPT | Performed by: FAMILY MEDICINE

## 2022-06-24 NOTE — PATIENT INSTRUCTIONS
1  Please make an appointment with Dr Kevin Marquis in discuss with him this left groin pain which most likely is hernia  Also discuss treatment for the future

## 2022-06-24 NOTE — TELEPHONE ENCOUNTER
Needs an appointment with Dr Carli Riggins for possible hernia  Referred by Dr Caroline Aguirre  Please call to schedule

## 2022-06-24 NOTE — PROGRESS NOTES
Assessment/Plan:    Left groin pain  Most likely a direct hernia  Will send to surgery for an evaluation and reduction and surgery in the near future        BMI Counseling: Body mass index is 30 81 kg/m²  The BMI is above normal  Nutrition recommendations include decreasing portion sizes and encouraging healthy choices of fruits and vegetables  Exercise recommendations include exercising 3-5 times per week  Rationale for BMI follow-up plan is due to patient being overweight or obese  Subjective:   Zoraida Walls is a 35 y o male  Chief Complaint   Patient presents with    Abdominal Pain     Down to testicle      About 3 weeks ago patient is walking his dog and the dog was significant around retaining wall that was about 2-1/2 ft tall  Patient lifted his left leg to lift himself over the wall and felt a sudden dull pain in his left testicle area as though his been hunched in that area  It felt uncomfortable most of the time afterward like he had pulled a muscle or something    In the last 2-3 days it has gone from annoying to painful  He cannot tell if it is in his testicle Co sometimes when he sits it feels like it is in his testicle or if it is in his groin  It seems to come and go  Yesterday he sneezed and really got very bad  After length for 20-30 minutes, the pain goes away      Past medical history, social history, and family history reviewed as appropriate for the complaint of this patient  MEDICATIONS REVIEWED AND UPDATED    10 point review of systems performed, the remainder of the ROS is negative except for what is noted in the history of chief complaint    Objective:    Vitals:    06/24/22 1521   BP: 122/74   Pulse: 80   Resp: 18   SpO2: 97%     Body mass index is 30 81 kg/m²      Physical Exam    General  Patient in no acute distress, well appearing, well nourished and appears stated age    Mental status  Good judgment and insight, oriented to time person and place, recent and remote memory is intact, mood and affect are normal, cooperative, and patient is reasonable    Left groin patient has the same reproducible pain in the distal 2/3 of his groin    There is a open defect about a finger tip with a bulging with Valsalva  Testes are descended bilaterally with no masses no tenderness  Spermatic cords are normal without any problems or nodule

## 2022-06-27 ENCOUNTER — OFFICE VISIT (OUTPATIENT)
Dept: SURGERY | Facility: CLINIC | Age: 42
End: 2022-06-27
Payer: COMMERCIAL

## 2022-06-27 VITALS
DIASTOLIC BLOOD PRESSURE: 80 MMHG | HEIGHT: 71 IN | BODY MASS INDEX: 31.36 KG/M2 | WEIGHT: 224 LBS | SYSTOLIC BLOOD PRESSURE: 127 MMHG | HEART RATE: 77 BPM

## 2022-06-27 DIAGNOSIS — R10.32 LEFT GROIN PAIN: ICD-10-CM

## 2022-06-27 PROCEDURE — 3008F BODY MASS INDEX DOCD: CPT | Performed by: SURGERY

## 2022-06-27 PROCEDURE — 99242 OFF/OP CONSLTJ NEW/EST SF 20: CPT | Performed by: SURGERY

## 2022-06-27 PROCEDURE — 1036F TOBACCO NON-USER: CPT | Performed by: SURGERY

## 2022-06-27 NOTE — PROGRESS NOTES
Assessment/Plan:  Patient complains of left lower quadrant abdominal bulge  Is been present over the last 4 weeks  This occurred after some mild activity  He states he can limit his activity  The discomfort occurs daily  Exertion or sneezing exacerbate this discomfort  Examination reveals no overt evidence for a left or right inguinal hernia  There is no overlying erythema or induration  He is no adenopathy  I am favoring a musculoskeletal strain  I recommended an ultrasound of the abdominal wall as a 2nd opinion  Patient is agreeable  Diagnoses and all orders for this visit:    Left groin pain  -     Ambulatory referral to General Surgery        Subjective:      Patient ID: Jose Zapien is a 35 y o  male  Patient presents for left inguinal hernia consult  States he felt a sharp pain LLQ 4 weeks ago  Denies bulge  Limits his activities  The following portions of the patient's history were reviewed and updated as appropriate:     He  has a past medical history of Cholelithiasis, GERD (gastroesophageal reflux disease), Herniation of intervertebral disc of lumbar spine, and IBS (irritable bowel syndrome)  He  has a past surgical history that includes [place] tooth extraction; Colonoscopy; pr lap,cholecystectomy (N/A, 1/9/2017); pr esophagogastroduodenoscopy transoral diagnostic (N/A, 5/2/2019); and Upper gastrointestinal endoscopy  His family history includes Arthritis in his mother; Coronary artery disease in his maternal grandfather; Heart attack in his father; Hyperlipidemia in his father and mother; Hypertension in his father; Nephrolithiasis in his mother; Stroke in his father; Sudden death in his father  He  reports that he has never smoked  He has never used smokeless tobacco  He reports that he does not drink alcohol and does not use drugs    Current Outpatient Medications   Medication Sig Dispense Refill    Loperamide HCl (IMODIUM PO) Take by mouth       No current facility-administered medications for this visit  He is allergic to other       Review of Systems   Constitutional: Negative  Negative for activity change  HENT: Negative  Eyes: Negative  Respiratory: Negative  Cardiovascular: Negative  Gastrointestinal: Positive for abdominal pain  Endocrine: Negative  Genitourinary: Negative  Musculoskeletal: Negative  Skin: Negative  Allergic/Immunologic: Negative  Neurological: Negative  Psychiatric/Behavioral: Negative for agitation, behavioral problems and confusion  The patient is not nervous/anxious  All other systems reviewed and are negative  Objective:      /80   Pulse 77   Ht 5' 11" (1 803 m)   Wt 102 kg (224 lb)   BMI 31 24 kg/m²          Physical Exam  Constitutional:       Appearance: He is well-developed  He is not diaphoretic  HENT:      Head: Normocephalic and atraumatic  Eyes:      General: No scleral icterus  Right eye: No discharge  Left eye: No discharge  Extraocular Movements: Extraocular movements intact  Conjunctiva/sclera: Conjunctivae normal    Neck:      Thyroid: No thyromegaly  Trachea: No tracheal deviation  Cardiovascular:      Rate and Rhythm: Normal rate  Heart sounds: No murmur heard  No friction rub  Pulmonary:      Effort: Pulmonary effort is normal  No respiratory distress  Breath sounds: No stridor  No wheezing  Chest:      Chest wall: No tenderness  Abdominal:      General: There is no distension  Palpations: Abdomen is soft  There is no mass  Tenderness: There is no abdominal tenderness  There is no guarding or rebound  Hernia: No hernia is present  Musculoskeletal:         General: No tenderness  Cervical back: Normal range of motion and neck supple  Lymphadenopathy:      Cervical: No cervical adenopathy  Skin:     General: Skin is warm and dry  Findings: No erythema or rash     Neurological: Mental Status: He is alert and oriented to person, place, and time  Cranial Nerves: No cranial nerve deficit        Coordination: Coordination normal    Psychiatric:         Behavior: Behavior normal          Judgment: Judgment normal

## 2022-07-03 ENCOUNTER — HOSPITAL ENCOUNTER (OUTPATIENT)
Dept: ULTRASOUND IMAGING | Facility: HOSPITAL | Age: 42
Discharge: HOME | End: 2022-07-03
Payer: COMMERCIAL

## 2022-07-03 DIAGNOSIS — R10.32 LEFT GROIN PAIN: ICD-10-CM

## 2022-07-03 PROCEDURE — 76705 ECHO EXAM OF ABDOMEN: CPT

## 2022-07-07 NOTE — RESULT ENCOUNTER NOTE
Phone message left  Ultrasound is questioning a small fat containing left inguinal hernia  Usually if a hernia is present, the study is more definitive  Recommend continued observation  Please call the office at [phone] if symptoms persist for an additional 3 weeks

## 2022-07-21 ENCOUNTER — APPOINTMENT (OUTPATIENT)
Dept: LAB | Facility: CLINIC | Age: 42
End: 2022-07-21
Payer: COMMERCIAL

## 2022-07-21 DIAGNOSIS — Z00.00 ANNUAL PHYSICAL EXAM: ICD-10-CM

## 2022-07-21 LAB
ALBUMIN SERPL BCP-MCNC: 4 G/DL (ref 3.5–5)
ALP SERPL-CCNC: 61 U/L (ref 46–116)
ALT SERPL W P-5'-P-CCNC: 41 U/L (ref 12–78)
ANION GAP SERPL CALCULATED.3IONS-SCNC: 7 MMOL/L (ref 4–13)
AST SERPL W P-5'-P-CCNC: 22 U/L (ref 5–45)
BACTERIA UR QL AUTO: ABNORMAL /HPF
BASOPHILS # BLD AUTO: 0.02 THOUSANDS/ΜL (ref 0–0.1)
BASOPHILS NFR BLD AUTO: 0 % (ref 0–1)
BILIRUB SERPL-MCNC: 1.16 MG/DL (ref 0.2–1)
BILIRUB UR QL STRIP: NEGATIVE
BUN SERPL-MCNC: 10 MG/DL (ref 5–25)
CALCIUM SERPL-MCNC: 8.8 MG/DL (ref 8.3–10.1)
CHLORIDE SERPL-SCNC: 104 MMOL/L (ref 96–108)
CHOLEST SERPL-MCNC: 140 MG/DL
CLARITY UR: CLEAR
CO2 SERPL-SCNC: 26 MMOL/L (ref 21–32)
COLOR UR: ABNORMAL
CREAT SERPL-MCNC: 1.1 MG/DL (ref 0.6–1.3)
EOSINOPHIL # BLD AUTO: 0.06 THOUSAND/ΜL (ref 0–0.61)
EOSINOPHIL NFR BLD AUTO: 1 % (ref 0–6)
ERYTHROCYTE [DISTWIDTH] IN BLOOD BY AUTOMATED COUNT: 12.9 % (ref 11.6–15.1)
GFR SERPL CREATININE-BSD FRML MDRD: 81 ML/MIN/1.73SQ M
GLUCOSE P FAST SERPL-MCNC: 89 MG/DL (ref 65–99)
GLUCOSE UR STRIP-MCNC: NEGATIVE MG/DL
HCT VFR BLD AUTO: 43.7 % (ref 36.5–49.3)
HDLC SERPL-MCNC: 42 MG/DL
HGB BLD-MCNC: 14.5 G/DL (ref 12–17)
HGB UR QL STRIP.AUTO: NEGATIVE
IMM GRANULOCYTES # BLD AUTO: 0.01 THOUSAND/UL (ref 0–0.2)
IMM GRANULOCYTES NFR BLD AUTO: 0 % (ref 0–2)
KETONES UR STRIP-MCNC: ABNORMAL MG/DL
LDLC SERPL CALC-MCNC: 84 MG/DL (ref 0–100)
LEUKOCYTE ESTERASE UR QL STRIP: NEGATIVE
LYMPHOCYTES # BLD AUTO: 1.16 THOUSANDS/ΜL (ref 0.6–4.47)
LYMPHOCYTES NFR BLD AUTO: 23 % (ref 14–44)
MCH RBC QN AUTO: 29.1 PG (ref 26.8–34.3)
MCHC RBC AUTO-ENTMCNC: 33.2 G/DL (ref 31.4–37.4)
MCV RBC AUTO: 88 FL (ref 82–98)
MONOCYTES # BLD AUTO: 0.4 THOUSAND/ΜL (ref 0.17–1.22)
MONOCYTES NFR BLD AUTO: 8 % (ref 4–12)
MUCOUS THREADS UR QL AUTO: ABNORMAL
NEUTROPHILS # BLD AUTO: 3.41 THOUSANDS/ΜL (ref 1.85–7.62)
NEUTS SEG NFR BLD AUTO: 68 % (ref 43–75)
NITRITE UR QL STRIP: NEGATIVE
NON-SQ EPI CELLS URNS QL MICRO: ABNORMAL /HPF
NRBC BLD AUTO-RTO: 0 /100 WBCS
PH UR STRIP.AUTO: 6 [PH]
PLATELET # BLD AUTO: 293 THOUSANDS/UL (ref 149–390)
PMV BLD AUTO: 11.5 FL (ref 8.9–12.7)
POTASSIUM SERPL-SCNC: 3.8 MMOL/L (ref 3.5–5.3)
PROT SERPL-MCNC: 6.8 G/DL (ref 6.4–8.4)
PROT UR STRIP-MCNC: ABNORMAL MG/DL
RBC # BLD AUTO: 4.98 MILLION/UL (ref 3.88–5.62)
RBC #/AREA URNS AUTO: ABNORMAL /HPF
SODIUM SERPL-SCNC: 137 MMOL/L (ref 135–147)
SP GR UR STRIP.AUTO: 1.01 (ref 1–1.03)
TRIGL SERPL-MCNC: 72 MG/DL
TSH SERPL DL<=0.05 MIU/L-ACNC: 2.63 UIU/ML (ref 0.45–4.5)
UROBILINOGEN UR STRIP-ACNC: <2 MG/DL
WBC # BLD AUTO: 5.06 THOUSAND/UL (ref 4.31–10.16)
WBC #/AREA URNS AUTO: ABNORMAL /HPF

## 2022-07-21 PROCEDURE — 84443 ASSAY THYROID STIM HORMONE: CPT

## 2022-07-21 PROCEDURE — 85025 COMPLETE CBC W/AUTO DIFF WBC: CPT

## 2022-07-21 PROCEDURE — 80061 LIPID PANEL: CPT

## 2022-07-21 PROCEDURE — 81001 URINALYSIS AUTO W/SCOPE: CPT

## 2022-07-21 PROCEDURE — 36415 COLL VENOUS BLD VENIPUNCTURE: CPT

## 2022-07-21 PROCEDURE — 80053 COMPREHEN METABOLIC PANEL: CPT

## 2022-09-29 DIAGNOSIS — R79.89 LFT ELEVATION: Primary | ICD-10-CM

## 2023-06-15 ENCOUNTER — HOSPITAL ENCOUNTER (EMERGENCY)
Facility: HOSPITAL | Age: 43
Discharge: HOME | End: 2023-06-15
Attending: EMERGENCY MEDICINE
Payer: COMMERCIAL

## 2023-06-15 ENCOUNTER — APPOINTMENT (OUTPATIENT)
Dept: RADIOLOGY | Facility: HOSPITAL | Age: 43
End: 2023-06-15
Payer: COMMERCIAL

## 2023-06-15 VITALS
BODY MASS INDEX: 32.36 KG/M2 | HEART RATE: 80 BPM | TEMPERATURE: 97.8 F | DIASTOLIC BLOOD PRESSURE: 87 MMHG | OXYGEN SATURATION: 98 % | RESPIRATION RATE: 18 BRPM | WEIGHT: 232 LBS | SYSTOLIC BLOOD PRESSURE: 130 MMHG

## 2023-06-15 DIAGNOSIS — S99.922A TOE INJURY, LEFT, INITIAL ENCOUNTER: Primary | ICD-10-CM

## 2023-06-15 PROCEDURE — 73630 X-RAY EXAM OF FOOT: CPT

## 2023-06-15 PROCEDURE — 99283 EMERGENCY DEPT VISIT LOW MDM: CPT

## 2023-06-16 NOTE — ED PROVIDER NOTES
"History  Chief Complaint   Patient presents with   • Foot Pain     Patient complaint of left great toe pain \" dropped metal on it at work around 330pm\"     39 yo M presents to ED with left great toe pain after dropping an object on it at work earlier this afternoon (pipe)  Able to ambulate, but it hurts when bearing weight on that toe  No other injury or complaint  No open wound, just bruising  Nail intact  No meds taken PTA  History provided by:  Patient and medical records   used: No    Foot Laceration  Location:  Toe  Toe laceration location:  L great toe  Laceration mechanism:  Blunt object  Pain details:     Quality:  Aching    Severity:  Moderate    Timing:  Constant    Progression:  Unchanged  Foreign body present:  No foreign bodies  Relieved by:  None tried  Worsened by: Movement and pressure  Ineffective treatments:  None tried  Associated symptoms: redness and swelling    Associated symptoms: no fever, no focal weakness, no numbness, no rash and no streaking        Prior to Admission Medications   Prescriptions Last Dose Informant Patient Reported? Taking? Loperamide HCl (IMODIUM PO)  Self Yes No   Sig: Take by mouth      Facility-Administered Medications: None       Past Medical History:   Diagnosis Date   • Cholelithiasis    • GERD (gastroesophageal reflux disease)    • Herniation of intervertebral disc of lumbar spine    • IBS (irritable bowel syndrome)        Past Surgical History:   Procedure Laterality Date   • COLONOSCOPY     • AL ESOPHAGOGASTRODUODENOSCOPY TRANSORAL DIAGNOSTIC N/A 5/2/2019    Procedure: ESOPHAGOGASTRODUODENOSCOPY (EGD); Surgeon: Shonda Mcknight MD;  Location[facility];   Service: Gastroenterology   • AL LAPAROSCOPY SURG CHOLECYSTECTOMY N/A 1/9/2017    Procedure: CHOLECYSTECTOMY LAPAROSCOPIC;  Surgeon: Stephen Alanis MD;  Location: [facility] OR;  Service: General   • UPPER GASTROINTESTINAL ENDOSCOPY     • WISDOM TOOTH EXTRACTION         Family History " Problem Relation Age of Onset   • Nephrolithiasis Mother    • Arthritis Mother    • Hyperlipidemia Mother    • Heart attack Father    • Hypertension Father    • Stroke Father    • Sudden death Father    • Hyperlipidemia Father    • Coronary artery disease Maternal Grandfather    • Alcohol abuse Neg Hx    • Substance Abuse Neg Hx    • Mental illness Neg Hx    • Colon cancer Neg Hx    • Colon polyps Neg Hx      I have reviewed and agree with the history as documented  E-Cigarette/Vaping   • E-Cigarette Use Never User      E-Cigarette/Vaping Substances   • Nicotine No    • THC No    • CBD No    • Flavoring No    • Other No    • Unknown No      Social History     Tobacco Use   • Smoking status: Never   • Smokeless tobacco: Never   Vaping Use   • Vaping Use: Never used   Substance Use Topics   • Alcohol use: No   • Drug use: No       Review of Systems   Constitutional: Negative for chills, diaphoresis, fatigue, fever and unexpected weight change  HENT: Negative for congestion, ear pain, rhinorrhea, sore throat, trouble swallowing and voice change  Eyes: Negative for pain and visual disturbance  Respiratory: Negative for cough, chest tightness and shortness of breath  Cardiovascular: Negative for chest pain, palpitations and leg swelling  Gastrointestinal: Negative for abdominal pain, blood in stool, constipation, diarrhea, nausea and vomiting  Genitourinary: Negative for difficulty urinating and hematuria  Musculoskeletal: Negative for arthralgias, back pain and neck pain  Skin: Negative for rash  Neurological: Negative for dizziness, focal weakness, syncope, light-headedness and headaches  Psychiatric/Behavioral: Negative for confusion and suicidal ideas  The patient is not nervous/anxious  Physical Exam  Physical Exam  Vitals and nursing note reviewed  Constitutional:       General: He is not in acute distress  Appearance: He is well-developed  He is not diaphoretic     HENT: Head: Normocephalic and atraumatic  Right Ear: External ear normal       Left Ear: External ear normal       Nose: Nose normal    Eyes:      General: No scleral icterus  Right eye: No discharge  Left eye: No discharge  Conjunctiva/sclera: Conjunctivae normal       Pupils: Pupils are equal, round, and reactive to light  Neck:      Vascular: No JVD  Trachea: No tracheal deviation  Cardiovascular:      Rate and Rhythm: Normal rate and regular rhythm  Heart sounds: Normal heart sounds  No murmur heard  No friction rub  No gallop  Pulmonary:      Effort: Pulmonary effort is normal  No respiratory distress  Breath sounds: Normal breath sounds  No stridor  No wheezing or rales  Chest:      Chest wall: No tenderness  Abdominal:      General: Bowel sounds are normal  There is no distension  Palpations: Abdomen is soft  Tenderness: There is no abdominal tenderness  There is no guarding or rebound  Musculoskeletal:         General: Swelling (left great toe is swollen and black and blue  no open wound  nail intact with no subungal hematoma  painful to move  no foot or ankle injury or pain  ) present  No tenderness or deformity  Normal range of motion  Cervical back: Normal range of motion and neck supple  Lymphadenopathy:      Cervical: No cervical adenopathy  Skin:     General: Skin is warm and dry  Findings: No rash  Neurological:      Mental Status: He is alert and oriented to person, place, and time  Cranial Nerves: No cranial nerve deficit  Sensory: No sensory deficit        Coordination: Coordination normal    Psychiatric:         Behavior: Behavior normal          Vital Signs  ED Triage Vitals [06/15/23 2040]   Temperature Pulse Respirations Blood Pressure SpO2   97 8 °F (36 6 °C) 86 18 148/98 98 %      Temp src Heart Rate Source Patient Position - Orthostatic VS BP Location FiO2 (%)   -- -- -- -- --      Pain Score       -- Vitals:    06/15/23 2040   BP: 148/98   Pulse: 86         Visual Acuity      ED Medications  Medications - No data to display    Diagnostic Studies  Results Reviewed     None                 XR foot 3+ views LEFT   ED Interpretation by Delmi Escalera MD (06/15 2208)   No acute fracture or dislocation  Procedures  Procedures         ED Course                               SBIRT 22yo+    Flowsheet Row Most Recent Value   Initial Alcohol Screen: US AUDIT-C     1  How often do you have a drink containing alcohol? 0 Filed at: 06/15/2023 2042   2  How many drinks containing alcohol do you have on a typical day you are drinking? 0 Filed at: 06/15/2023 2042   3a  Male UNDER 65: How often do you have five or more drinks on one occasion? 0 Filed at: 06/15/2023 2042   3b  FEMALE Any Age, or MALE 65+: How often do you have 4 or more drinks on one occassion? 0 Filed at: 06/15/2023 2042   Audit-C Score 0 Filed at: 06/15/2023 2042   JANA: How many times in the past year have you    Used an illegal drug or used a prescription medication for non-medical reasons? Never Filed at: 06/15/2023 2042                    Medical Decision Making  Discussed RICE, naveen tape, and supportive care at home  F/u with PCP  RTED if sx worsen  All questions answered  Toe injury, left, initial encounter: acute illness or injury  Amount and/or Complexity of Data Reviewed  Radiology: ordered and independent interpretation performed  Decision-making details documented in ED Course  Disposition  Final diagnoses:    Toe injury, left, initial encounter     Time reflects when diagnosis was documented in both MDM as applicable and the Disposition within this note     Time User Action Codes Description Comment    6/15/2023 10:15 PM Marichuy Abarca Add [[id number]] Toe injury, left, initial encounter       ED Disposition     ED Disposition   Discharge    Condition   Stable    Date/Time   Thu Kuldeep 15, 2023 10:15 PM Comment   Sheila Morgan discharge to home/self care  Follow-up Information     Follow up With Specialties Details Why Contact Info Additional Information    Daniella Cottrell PA-C Family Medicine, Physician Assistant Schedule an appointment as soon as possible for a visit   [address] [facility], [address] [facility] [address] Emergency Department Emergency Medicine  If symptoms worsen [address] [place], [id number]  [address] [facility] Emergency Emergency Department, [address] [facility], [facility] yarelis [address]          Patient's Medications   Discharge Prescriptions    No medications on file       No discharge procedures on file      PDMP Review     None          ED Provider  Electronically Signed by           Davon Harden MD  06/15/23 5260

## 2023-07-07 ENCOUNTER — RA CDI HCC (OUTPATIENT)
Dept: OTHER | Facility: HOSPITAL | Age: 43
End: 2023-07-07

## 2023-07-07 NOTE — PROGRESS NOTES
[address] [facility] coding opportunities       Chart reviewed, no opportunity found: CHART REVIEWED, NO OPPORTUNITY FOUND        Patients Insurance        Commercial Insurance: Ross Dunn

## 2023-07-14 ENCOUNTER — OFFICE VISIT (OUTPATIENT)
Dept: FAMILY MEDICINE CLINIC | Facility: CLINIC | Age: 43
End: 2023-07-14
Payer: COMMERCIAL

## 2023-07-14 VITALS
RESPIRATION RATE: 16 BRPM | HEART RATE: 83 BPM | TEMPERATURE: 98.1 F | OXYGEN SATURATION: 98 % | BODY MASS INDEX: 32.06 KG/M2 | WEIGHT: 229 LBS | DIASTOLIC BLOOD PRESSURE: 80 MMHG | SYSTOLIC BLOOD PRESSURE: 120 MMHG | HEIGHT: 71 IN

## 2023-07-14 DIAGNOSIS — Z00.00 ANNUAL PHYSICAL EXAM: Primary | ICD-10-CM

## 2023-07-14 PROCEDURE — 99396 PREV VISIT EST AGE 40-64: CPT | Performed by: PHYSICIAN ASSISTANT

## 2023-07-14 NOTE — PROGRESS NOTES
ADULT ANNUAL PHYSICAL  [address] [facility] PRACTICE    NAME: Lucero Pratt  AGE: 38 y.o. SEX: male  : 1980     DATE: 2023     Assessment and Plan:     Healthy 38year old male    Immunizations and preventive care screenings were discussed with patient today. Appropriate education was printed on patient's after visit summary. Discussed risks and benefits of prostate cancer screening. We discussed the controversial history of PSA screening for prostate cancer in the [facility] as well as the risk of over detection and over treatment of prostate cancer by way of PSA screening. The patient understands that PSA blood testing is an imperfect way to screen for prostate cancer and that elevated PSA levels in the blood may also be caused by infection, inflammation, prostatic trauma or manipulation, urological procedures, or by benign prostatic enlargement. The role of the digital rectal examination in prostate cancer screening was also discussed and I discussed with him that there is large interobserver variability in the findings of digital rectal examination. Counseling:  Alcohol/drug use: discussed moderation in alcohol intake, the recommendations for healthy alcohol use, and avoidance of illicit drug use. Dental Health: discussed importance of regular tooth brushing, flossing, and dental visits. Injury prevention: discussed safety/seat belts, safety helmets, smoke detectors, carbon dioxide detectors, and smoking near bedding or upholstery. Sexual health: discussed sexually transmitted diseases, partner selection, use of condoms, avoidance of unintended pregnancy, and contraceptive alternatives. · Exercise: the importance of regular exercise/physical activity was discussed. Recommend exercise 3-5 times per week for at least 30 minutes.    · Will be due colonoscopy 2023  · Labs ordered         Return in 1 year (on 7/14/2024). Chief Complaint:     Chief Complaint   Patient presents with   • Physical Exam      History of Present Illness:     Adult Annual Physical   Patient here for a comprehensive physical exam. The patient reports no problems. Diet and Physical Activity  · Diet/Nutrition: well balanced diet. · Exercise: walking. Depression Screening  PHQ-2/9 Depression Screening    Little interest or pleasure in doing things: 0 - not at all  Feeling down, depressed, or hopeless: 0 - not at all  PHQ-2 Score: 0  PHQ-2 Interpretation: Negative depression screen       General Health  · Sleep: sleeps well. · Hearing: normal - bilateral.  · Vision: goes for regular eye exams. · Dental: regular dental visits.  Health  · Symptoms include: none     Review of Systems:     Review of Systems   Constitutional: Negative. HENT: Negative. Eyes: Negative. Respiratory: Negative. Cardiovascular: Negative. Gastrointestinal: Negative. Endocrine: Negative. Genitourinary: Negative. Musculoskeletal: Negative. Skin: Negative. Allergic/Immunologic: Negative. Neurological: Negative. Hematological: Negative. Psychiatric/Behavioral: Negative. Past Medical History:     Past Medical History:   Diagnosis Date   • Cholelithiasis    • GERD (gastroesophageal reflux disease)    • Herniation of intervertebral disc of lumbar spine    • IBS (irritable bowel syndrome)       Past Surgical History:     Past Surgical History:   Procedure Laterality Date   • COLONOSCOPY     • UT ESOPHAGOGASTRODUODENOSCOPY TRANSORAL DIAGNOSTIC N/A 5/2/2019    Procedure: ESOPHAGOGASTRODUODENOSCOPY (EGD); Surgeon: London Shah MD;  Location: [facility];   Service: Gastroenterology   • UT LAPAROSCOPY SURG CHOLECYSTECTOMY N/A 1/9/2017    Procedure: CHOLECYSTECTOMY LAPAROSCOPIC;  Surgeon: Ely Laird MD;  Location: [facility] OR;  Service: General   • UPPER GASTROINTESTINAL ENDOSCOPY     • WISDOM TOOTH EXTRACTION Family History:     Family History   Problem Relation Age of Onset   • Nephrolithiasis Mother    • Arthritis Mother    • Hyperlipidemia Mother    • Heart attack Father    • Hypertension Father    • Stroke Father    • Sudden death Father    • Hyperlipidemia Father    • Coronary artery disease Maternal Grandfather    • Alcohol abuse Neg Hx    • Substance Abuse Neg Hx    • Mental illness Neg Hx    • Colon cancer Neg Hx    • Colon polyps Neg Hx       Social History:     Social History     Socioeconomic History   • Marital status: Single     Spouse name: None   • Number of children: None   • Years of education: None   • Highest education level: None   Occupational History   • None   Tobacco Use   • Smoking status: Never   • Smokeless tobacco: Never   Vaping Use   • Vaping Use: Never used   Substance and Sexual Activity   • Alcohol use: No   • Drug use: No   • Sexual activity: None   Other Topics Concern   • None   Social History Narrative   • None     Social Determinants of Health     Financial Resource Strain: Not on file   Food Insecurity: Not on file   Transportation Needs: Not on file   Physical Activity: Not on file   Stress: Not on file   Social Connections: Not on file   Intimate Partner Violence: Not on file   Housing Stability: Not on file      Current Medications:     Current Outpatient Medications   Medication Sig Dispense Refill   • Loperamide HCl (IMODIUM PO) Take by mouth       No current facility-administered medications for this visit. Allergies: Allergies   Allergen Reactions   • Other      Annotation - [id number]: poison ivy/oak      Physical Exam:     /80   Pulse 83   Temp 98.1 °F (36.7 °C) (Oral)   Resp 16   Ht 5' 11" (1.803 m)   Wt 104 kg (229 lb)   SpO2 98%   BMI 31.94 kg/m²     Physical Exam  Constitutional:       Appearance: Normal appearance. He is well-developed. HENT:      Head: Normocephalic and atraumatic.    Eyes:      Extraocular Movements: Extraocular movements intact. Conjunctiva/sclera: Conjunctivae normal.      Pupils: Pupils are equal, round, and reactive to light. Neck:      Thyroid: No thyromegaly. Cardiovascular:      Rate and Rhythm: Normal rate and regular rhythm. Heart sounds: Normal heart sounds. No murmur heard. Pulmonary:      Effort: Pulmonary effort is normal.      Breath sounds: Normal breath sounds. No wheezing or rales. Abdominal:      General: Bowel sounds are normal.      Palpations: Abdomen is soft. There is no mass. Tenderness: There is no abdominal tenderness. There is no rebound. Musculoskeletal:         General: Normal range of motion. Cervical back: Normal range of motion and neck supple. Lymphadenopathy:      Cervical: No cervical adenopathy. Skin:     General: Skin is warm. Neurological:      General: No focal deficit present. Mental Status: He is alert and oriented to person, place, and time. Cranial Nerves: No cranial nerve deficit. Deep Tendon Reflexes: Reflexes normal.   Psychiatric:         Mood and Affect: Mood normal.         Behavior: Behavior normal.         Thought Content: Thought content normal.         Judgment: Judgment normal.          Kelly Booth PA-C  [address]     BMI Counseling: Body mass index is 31.94 kg/m². The BMI is above normal. Nutrition recommendations include reducing portion sizes.

## 2023-09-05 ENCOUNTER — APPOINTMENT (OUTPATIENT)
Dept: LAB | Facility: CLINIC | Age: 43
End: 2023-09-05
Payer: COMMERCIAL

## 2023-09-05 DIAGNOSIS — Z00.00 ROUTINE GENERAL MEDICAL EXAMINATION AT A HEALTH CARE FACILITY: ICD-10-CM

## 2023-09-05 LAB
ALBUMIN SERPL BCP-MCNC: 4.2 G/DL (ref 3.5–5)
ALP SERPL-CCNC: 71 U/L (ref 34–104)
ALT SERPL W P-5'-P-CCNC: 48 U/L (ref 7–52)
ANION GAP SERPL CALCULATED.3IONS-SCNC: 10 MMOL/L
AST SERPL W P-5'-P-CCNC: 37 U/L (ref 13–39)
BASOPHILS # BLD AUTO: 0.03 THOUSANDS/ÂΜL (ref 0–0.1)
BASOPHILS NFR BLD AUTO: 1 % (ref 0–1)
BILIRUB SERPL-MCNC: 0.97 MG/DL (ref 0.2–1)
BUN SERPL-MCNC: 11 MG/DL (ref 5–25)
CALCIUM SERPL-MCNC: 9 MG/DL (ref 8.4–10.2)
CHLORIDE SERPL-SCNC: 104 MMOL/L (ref 96–108)
CHOLEST SERPL-MCNC: 171 MG/DL
CO2 SERPL-SCNC: 25 MMOL/L (ref 21–32)
CREAT SERPL-MCNC: 1.04 MG/DL (ref 0.6–1.3)
EOSINOPHIL # BLD AUTO: 0.09 THOUSAND/ÂΜL (ref 0–0.61)
EOSINOPHIL NFR BLD AUTO: 2 % (ref 0–6)
ERYTHROCYTE [DISTWIDTH] IN BLOOD BY AUTOMATED COUNT: 13.1 % (ref 11.6–15.1)
GFR SERPL CREATININE-BSD FRML MDRD: 86 ML/MIN/1.73SQ M
GLUCOSE P FAST SERPL-MCNC: 92 MG/DL (ref 65–99)
HCT VFR BLD AUTO: 42.7 % (ref 36.5–49.3)
HDLC SERPL-MCNC: 44 MG/DL
HGB BLD-MCNC: 14.5 G/DL (ref 12–17)
IMM GRANULOCYTES # BLD AUTO: 0.01 THOUSAND/UL (ref 0–0.2)
IMM GRANULOCYTES NFR BLD AUTO: 0 % (ref 0–2)
LDLC SERPL CALC-MCNC: 111 MG/DL (ref 0–100)
LYMPHOCYTES # BLD AUTO: 1.2 THOUSANDS/ÂΜL (ref 0.6–4.47)
LYMPHOCYTES NFR BLD AUTO: 23 % (ref 14–44)
MCH RBC QN AUTO: 29.8 PG (ref 26.8–34.3)
MCHC RBC AUTO-ENTMCNC: 34 G/DL (ref 31.4–37.4)
MCV RBC AUTO: 88 FL (ref 82–98)
MONOCYTES # BLD AUTO: 0.44 THOUSAND/ÂΜL (ref 0.17–1.22)
MONOCYTES NFR BLD AUTO: 8 % (ref 4–12)
NEUTROPHILS # BLD AUTO: 3.56 THOUSANDS/ÂΜL (ref 1.85–7.62)
NEUTS SEG NFR BLD AUTO: 66 % (ref 43–75)
NRBC BLD AUTO-RTO: 0 /100 WBCS
PLATELET # BLD AUTO: 292 THOUSANDS/UL (ref 149–390)
PMV BLD AUTO: 11.7 FL (ref 8.9–12.7)
POTASSIUM SERPL-SCNC: 4.2 MMOL/L (ref 3.5–5.3)
PROT SERPL-MCNC: 6.7 G/DL (ref 6.4–8.4)
RBC # BLD AUTO: 4.86 MILLION/UL (ref 3.88–5.62)
SODIUM SERPL-SCNC: 139 MMOL/L (ref 135–147)
TRIGL SERPL-MCNC: 81 MG/DL
TSH SERPL DL<=0.05 MIU/L-ACNC: 2.99 UIU/ML (ref 0.45–4.5)
WBC # BLD AUTO: 5.33 THOUSAND/UL (ref 4.31–10.16)

## 2023-09-05 PROCEDURE — 36415 COLL VENOUS BLD VENIPUNCTURE: CPT

## 2023-09-05 PROCEDURE — 85025 COMPLETE CBC W/AUTO DIFF WBC: CPT

## 2023-09-05 PROCEDURE — 84443 ASSAY THYROID STIM HORMONE: CPT

## 2023-09-05 PROCEDURE — 80053 COMPREHEN METABOLIC PANEL: CPT

## 2023-09-05 PROCEDURE — 80061 LIPID PANEL: CPT

## 2023-10-16 ENCOUNTER — OFFICE VISIT (OUTPATIENT)
Dept: SURGERY | Facility: CLINIC | Age: 43
End: 2023-10-16
Payer: COMMERCIAL

## 2023-10-16 VITALS
DIASTOLIC BLOOD PRESSURE: 86 MMHG | BODY MASS INDEX: 32.9 KG/M2 | HEIGHT: 71 IN | WEIGHT: 235 LBS | HEART RATE: 84 BPM | SYSTOLIC BLOOD PRESSURE: 138 MMHG

## 2023-10-16 DIAGNOSIS — R10.32 LEFT GROIN PAIN: Primary | ICD-10-CM

## 2023-10-16 PROCEDURE — 99213 OFFICE O/P EST LOW 20 MIN: CPT | Performed by: SURGERY

## 2023-10-16 NOTE — PROGRESS NOTES
Assessment/Plan: Patient presents in follow-up for evaluation of left inguinal discomfort. He was seen 1 year ago with discomfort that can begin in the left testicle and radiates into the left groin. No herniation was noted. Ultrasound was unremarkable. I favored a musculoskeletal strain at that time. He states that his pain will gradually dissipate. This most prominent in the morning and would improve throughout the day. More recently had more acute onset of left groin and testicular discomfort and therefore a reevaluation was requested. His pain is not worse with coughing or exercise. He simply sat down to a chair which caused an exacerbation of his discomfort. He denies any inguinal bulge. He denies any radicular pain to the leg. Examination of the left and right inguinal region reveals no evidence for herniation. No testicular nodules are noted on exam.    I explained that I am at a loss as to why he has this discomfort. I do not believe that it is a herniation. Musculoskeletal also seems less likely. I urology consult is requested for completeness. Question possibility of prostatitis causing radicular discomfort. There are no diagnoses linked to this encounter. Subjective:      Patient ID: Henny Mckeon is a 38 y.o. male. Patient presents for follow up on possible left inguinal hernia. Ultrasound abdominal wall 7/3/2022   FINDINGS:  Question a small fat-containing left inguinal hernia on cine clips. IMPRESSION:  Question a small fat-containing left inguinal hernia. The following portions of the patient's history were reviewed and updated as appropriate:     He  has a past medical history of Cholelithiasis, GERD (gastroesophageal reflux disease), Herniation of intervertebral disc of lumbar spine, and IBS (irritable bowel syndrome).   He  has a past surgical history that includes [place] tooth extraction; Colonoscopy; pr laparoscopy surg cholecystectomy (N/A, 01/09/2017); pr esophagogastroduodenoscopy transoral diagnostic (N/A, 05/02/2019); Upper gastrointestinal endoscopy; and Cholecystectomy. His family history includes Arthritis in his mother; Coronary artery disease in his maternal grandfather; Heart attack in his father; Heart disease in his father; Hyperlipidemia in his father and mother; Hypertension in his father; Nephrolithiasis in his mother; Stroke in his father; Sudden death in his father. He  reports that he has never smoked. He has never used smokeless tobacco. He reports that he does not drink alcohol and does not use drugs. Current Outpatient Medications   Medication Sig Dispense Refill    Loperamide HCl (IMODIUM PO) Take by mouth       No current facility-administered medications for this visit. He is allergic to other. .    Review of Systems   Constitutional: Negative. Negative for activity change. HENT: Negative. Eyes: Negative. Respiratory: Negative. Cardiovascular: Negative. Gastrointestinal:  Positive for abdominal pain. Endocrine: Negative. Genitourinary: Negative. Musculoskeletal: Negative. Skin: Negative. Allergic/Immunologic: Negative. Neurological: Negative. Psychiatric/Behavioral:  Negative for agitation, behavioral problems and confusion. The patient is not nervous/anxious. All other systems reviewed and are negative. Objective:      /86   Pulse 84   Ht 5' 11" (1.803 m)   Wt 107 kg (235 lb)   BMI 32.78 kg/m²          Physical Exam  Constitutional:       Appearance: He is well-developed. He is not diaphoretic. HENT:      Head: Normocephalic and atraumatic. Eyes:      General: No scleral icterus. Right eye: No discharge. Left eye: No discharge. Extraocular Movements: Extraocular movements intact. Conjunctiva/sclera: Conjunctivae normal.   Neck:      Thyroid: No thyromegaly. Trachea: No tracheal deviation.    Cardiovascular:      Heart sounds: No murmur heard. No friction rub. Pulmonary:      Effort: Pulmonary effort is normal. No respiratory distress. Breath sounds: No stridor. No wheezing. Chest:      Chest wall: No tenderness. Abdominal:      General: There is no distension. Palpations: There is no mass. Tenderness: There is no abdominal tenderness. There is no guarding or rebound. Hernia: No hernia is present. Genitourinary:     Testes: Normal.   Musculoskeletal:         General: No tenderness. Right lower leg: No edema. Left lower leg: No edema. Lymphadenopathy:      Cervical: No cervical adenopathy. Skin:     General: Skin is warm and dry. Findings: No erythema or rash. Neurological:      Mental Status: He is alert and oriented to person, place, and time. Cranial Nerves: No cranial nerve deficit.       Coordination: Coordination normal.   Psychiatric:         Behavior: Behavior normal.         Judgment: Judgment normal.

## 2023-11-29 ENCOUNTER — OFFICE VISIT (OUTPATIENT)
Dept: UROLOGY | Facility: MEDICAL CENTER | Age: 43
End: 2023-11-29
Payer: COMMERCIAL

## 2023-11-29 VITALS
WEIGHT: 231 LBS | BODY MASS INDEX: 32.34 KG/M2 | OXYGEN SATURATION: 98 % | HEIGHT: 71 IN | SYSTOLIC BLOOD PRESSURE: 110 MMHG | DIASTOLIC BLOOD PRESSURE: 80 MMHG | HEART RATE: 72 BPM

## 2023-11-29 DIAGNOSIS — R10.32 LEFT GROIN PAIN: ICD-10-CM

## 2023-11-29 DIAGNOSIS — N20.0 KIDNEY STONE: ICD-10-CM

## 2023-11-29 DIAGNOSIS — N50.812 LEFT TESTICULAR PAIN: Primary | ICD-10-CM

## 2023-11-29 PROCEDURE — 99214 OFFICE O/P EST MOD 30 MIN: CPT | Performed by: STUDENT IN AN ORGANIZED HEALTH CARE EDUCATION/TRAINING PROGRAM

## 2023-11-29 NOTE — PROGRESS NOTES
Urology Ambulatory Progress Note  11/29/2023    Luanne Weaver  1980  [id number]      Problem List Items Addressed This Visit          Other    Left groin pain    Relevant Orders    US scrotum and testicles    Left testicular pain - Primary    Relevant Orders    US scrotum and testicles    US kidney and bladder     Other Visit Diagnoses       Kidney stone        Relevant Orders    US kidney and bladder            Assessment:  Left testicular/groin pain-patient has had chronic left testicular/groin pain of unclear etiology. Thankfully it is not severe. He does have a history of a single kidney stone. We discussed the potential causes of which there are many. I recommend we start with a scrotal ultrasound to ensure there is no intrascrotal pathology. Will also get a kidney and bladder ultrasound. I explained that the ureteral calculus can cause referred pain to the testicle as well. The patient expressed understanding. I explained that there are many times where patients have testicular pain and the cause is not identified. In this case we treated as an inflammatory illness and start with over-the-counter anti-inflammatories plus or minus a cord block. The patient expressed understanding. Plan:  Obtain scrotal ultrasound and renal ultrasound  Return to clinic to discuss results and for possible cord block      Chief Complaint: left groin pain    History of Present Illness  Luanne Weaver is a 37 y.o. male presenting for evaluation of left groin pain. Has pain that feels like its around the L testicle but other times is in the inguinal region. It's unclear where the pain originates/radiates to. Has had this pain intermittently for >1 year. 1st experienced this pain summer 2022. The pain is intermittent and can go away for up to a couple weeks. No exacerbating or alleviating factors. Has been evaluated by PCP and general surgery and no hernia was found.   At the most the pain gets to 2/10. Is a dull pain. No prior trauma. Has had a single kidney stone in the past that passed on its own. Past Medical History  Past Medical History:   Diagnosis Date    Cholelithiasis     GERD (gastroesophageal reflux disease)     Herniation of intervertebral disc of lumbar spine     IBS (irritable bowel syndrome)        Past Surgical History  Past Surgical History:   Procedure Laterality Date    CHOLECYSTECTOMY      COLONOSCOPY      [place] ESOPHAGOGASTRODUODENOSCOPY TRANSORAL DIAGNOSTIC N/A 05/02/2019    Procedure: ESOPHAGOGASTRODUODENOSCOPY (EGD); Surgeon: Jovana Rush MD;  Location: [facility]; Service: Gastroenterology    [place] LAPAROSCOPY SURG CHOLECYSTECTOMY N/A 01/09/2017    Procedure: [place] [employer];  Surgeon: Yareli Cai MD;  Location: [facility];  Service: General    UPPER GASTROINTESTINAL ENDOSCOPY      WISDOM TOOTH EXTRACTION         Physical Exam  /80 (BP Location: Left arm, Patient Position: Sitting, Cuff Size: Large)   Pulse 72   Ht 5' 11" (1.803 m)   Wt 105 kg (231 lb)   SpO2 98%   BMI 32.22 kg/m²     General:  no acute distress. Head:  Normocephalic, atraumatic. Cardiovascular:  Regular rate  Respiratory:  Patient has unlabored respirations. Genitourinary: Normal circumcised phallus, testes are normal to palpation bilaterally and symmetric in size, the cord is palpably normal bilaterally and the vas deferens is easily palpated      Tasha Hines MD  [facility]    Portions of the above record have been created with voice recognition software. Occasional wrong word or "sound alike" substitution may have occurred due to the inherent limitations of voice recognition software. Please read the chart carefully and recognize, using context, where substitution may have occurred. For further clarification, please contact me directly.

## 2023-12-11 ENCOUNTER — HOSPITAL ENCOUNTER (OUTPATIENT)
Dept: ULTRASOUND IMAGING | Facility: HOSPITAL | Age: 43
Discharge: HOME | End: 2023-12-11
Attending: STUDENT IN AN ORGANIZED HEALTH CARE EDUCATION/TRAINING PROGRAM
Payer: COMMERCIAL

## 2023-12-11 DIAGNOSIS — R10.32 LEFT GROIN PAIN: ICD-10-CM

## 2023-12-11 DIAGNOSIS — N50.812 LEFT TESTICULAR PAIN: ICD-10-CM

## 2023-12-11 DIAGNOSIS — N20.0 KIDNEY STONE: ICD-10-CM

## 2023-12-11 PROCEDURE — 76870 US EXAM SCROTUM: CPT

## 2023-12-11 PROCEDURE — 76775 US EXAM ABDO BACK WALL LIM: CPT

## 2023-12-28 ENCOUNTER — OFFICE VISIT (OUTPATIENT)
Dept: UROLOGY | Facility: MEDICAL CENTER | Age: 43
End: 2023-12-28
Payer: COMMERCIAL

## 2023-12-28 VITALS
WEIGHT: 234 LBS | HEART RATE: 77 BPM | DIASTOLIC BLOOD PRESSURE: 82 MMHG | HEIGHT: 71 IN | BODY MASS INDEX: 32.76 KG/M2 | OXYGEN SATURATION: 98 % | SYSTOLIC BLOOD PRESSURE: 120 MMHG

## 2023-12-28 DIAGNOSIS — N50.812 LEFT TESTICULAR PAIN: Primary | ICD-10-CM

## 2023-12-28 DIAGNOSIS — R10.32 LEFT GROIN PAIN: ICD-10-CM

## 2023-12-28 PROCEDURE — 99214 OFFICE O/P EST MOD 30 MIN: CPT | Performed by: STUDENT IN AN ORGANIZED HEALTH CARE EDUCATION/TRAINING PROGRAM

## 2023-12-28 NOTE — PROGRESS NOTES
Urology Ambulatory Progress Note  12/28/2023    Bennie Finn  1980  [id number]      Assessment/Plan:  Problem List Items Addressed This Visit          Other    Left groin pain    Relevant Orders    US groin/inguinal area    Left testicular pain - Primary    Relevant Orders    US groin/inguinal area       I discussed that the treatment of testicular pain can be difficult and a cause can sometimes not be identified.  He had an abdominal wall ultrasound July 2022 that was inconclusive for left inguinal hernia.  In regards to his testicular pain he has not tried anything for the pain to date.  I recommend a trial of NSAIDs since the pain is minor.  I explained that this has both anti-inflammatory and analgesic properties.  I will order a groin ultrasound to reevaluate for possible inguinal hernia.  I explained to him if no hernias found I could offer him a spermatic cord block however this would only address his testicular pain and is unlikely to relieve pain in the groin.  Return to clinic with groin ultrasound prior for symptom recheck.  The patient to try NSAIDs in the interim    Chief Complaint: Follow-up for left groin/testicular pain    History of Present Illness  Bennie Finn is a 43 y.o. male presenting for re-evaluation of left groin/testicular pain and to review results.     Was initially seen by me about a month ago complaining of chronic left testicular/groin pain.  He had been evaluated by general surgeon in July 2022 and no hernia was found on exam.  Ultrasound was obtained but was inconclusive.  I obtained a renal ultrasound as well as testicular ultrasound and the findings were largely normal.  The only finding was a small left hydrocele which does not explain the pain he describes.  He is still having intermittent pain, but denies symptoms today.  The pain is the most bothersome when seated.  The left groin pain that he is experiencing is similar to the pain he he felt a year and a  "half ago that was suggestive of a hernia.  He also started playing volleyball again and is concerned about making a hernia if present worse.    Past Medical History  Past Medical History:   Diagnosis Date    Cholelithiasis     GERD (gastroesophageal reflux disease)     Herniation of intervertebral disc of lumbar spine     IBS (irritable bowel syndrome)        Past Surgical History  Past Surgical History:   Procedure Laterality Date    CHOLECYSTECTOMY      COLONOSCOPY      [place] ESOPHAGOGASTRODUODENOSCOPY TRANSORAL DIAGNOSTIC N/A 05/02/2019    Procedure: ESOPHAGOGASTRODUODENOSCOPY (EGD);  Surgeon: Heber Mckee MD;  Location[facility];  Service: Gastroenterology    AR LAPAROSCOPY SURG CHOLECYSTECTOMY N/A 01/09/2017    Procedure: CHOLECYSTECTOMY LAPAROSCOPIC;  Surgeon: Ming Washington MD;  [facility];  Service: General    UPPER GASTROINTESTINAL ENDOSCOPY      WISDOM TOOTH EXTRACTION         Physical Exam  /82 (BP Location: Left arm, Patient Position: Sitting, Cuff Size: Standard)   Pulse 77   Ht 5' 11\" (1.803 m)   Wt 106 kg (234 lb)   SpO2 98%   BMI 32.64 kg/m²     General:  Healthy appearing male in no acute distress.   Head:  Normocephalic, atraumatic.   Cardiovascular:  Regular rate  Respiratory:  Patient has unlabored respirations.     Results  RENAL ULTRASOUND     INDICATION:   N50.812: Left testicular pain  N20.0: Calculus of kidney.     COMPARISON: 6/9/2021     TECHNIQUE:   Ultrasound of the retroperitoneum was performed with a curvilinear transducer utilizing volumetric sweeps and still imaging techniques.     FINDINGS:     KIDNEYS:  Symmetric and normal size.  Right kidney:  10.1 x 6.6 x 4.6 cm. Volume 159.5 mL  Left kidney:  11.5 x 6.5 x 5.2 cm.  Volume 204.5 mL     Right kidney  Normal echogenicity and contour.  No mass is identified.  No hydronephrosis.  No shadowing calculi.  No perinephric fluid collections.     Left kidney  Normal echogenicity and contour.  No mass is " "identified.  No hydronephrosis.  No shadowing calculi.  No perinephric fluid collections.     URETERS:  Nonvisualized.     BLADDER:  Normally distended.  No focal thickening or mass lesions.  Bilateral ureteral jets detected.           IMPRESSION:     Unremarkable sonographic appearance of the kidneys and urinary bladder.    SCROTAL ULTRASOUND     INDICATION:    R10.32: Left lower quadrant pain  N50.812: Left testicular pain.     COMPARISON: None     TECHNIQUE:   Ultrasound the scrotal contents was performed with a high frequency linear transducer utilizing volumetric sweep imaging as well as standard still image techniques. Imaging performed in longitudinal and transverse orientation. Color and   spectral Doppler evaluation also performed bilaterally.     FINDINGS:     TESTES:  Testes are symmetric and normal in size.     RIGHT testis = 4.0 x 2.2 x 2.3 cm. Volume 10.8 mL  Normal contour with homogeneous smooth echotexture.  No intratesticular mass lesion or calcifications.     LEFT testis = 4.3 x 2.1 x 2.6 cm. Volume 12.8 mL  Normal contour with homogeneous smooth echotexture.  No intratesticular mass lesion or calcifications.     Doppler flow within both testes is present and symmetric.     EPIDIDYMIDES:  Normal Size.  Doppler ultrasound demonstrates normal blood flow.  No epididymal lesions.     HYDROCELE: Small left hydrocele.     VARICOCELE:  None present.     SCROTUM:  Scrotal thickness and appearance within normal limits. No evidence for extratesticular mass or hernia demonstrated.     IMPRESSION:        1. Unremarkable testes.  2. Small left hydrocele.      Adina Falcon MD  [facility]    Portions of the above record have been created with voice recognition software. Occasional wrong word or \"sound alike\" substitution may have occurred due to the inherent limitations of voice recognition software.  Please read the chart carefully and recognize, using context, where substitution may have " occurred.  For further clarification, please contact me directly.

## 2024-01-13 ENCOUNTER — HOSPITAL ENCOUNTER (OUTPATIENT)
Dept: ULTRASOUND IMAGING | Facility: HOSPITAL | Age: 44
Discharge: HOME | End: 2024-01-13
Attending: STUDENT IN AN ORGANIZED HEALTH CARE EDUCATION/TRAINING PROGRAM
Payer: COMMERCIAL

## 2024-01-13 DIAGNOSIS — N50.812 LEFT TESTICULAR PAIN: ICD-10-CM

## 2024-01-13 DIAGNOSIS — R10.32 LEFT GROIN PAIN: ICD-10-CM

## 2024-01-13 PROCEDURE — 76705 ECHO EXAM OF ABDOMEN: CPT

## 2024-01-24 ENCOUNTER — TELEPHONE (OUTPATIENT)
Age: 44
End: 2024-01-24

## 2024-01-24 NOTE — TELEPHONE ENCOUNTER
Spoke to pt and advised:    Please let patient know US did show hernias as previously discussed at office visit. I can put in referral to gen surgery if he would like. Thanks             Electronically signed by Joi Ott PA-C at 1/24/2024 12:22 PM    Pt declined referral stating he has previously been seen by a general surgeon and he will make appt there.

## 2024-01-24 NOTE — TELEPHONE ENCOUNTER
Pt managed by Dr. Falcon   PT called for US results and find out follow up  please review    Pt can be reached at [phone]

## 2024-01-24 NOTE — TELEPHONE ENCOUNTER
Please let patient know US did show hernias as previously discussed at office visit. I can put in referral to gen surgery if he would like. Thanks

## 2024-01-25 ENCOUNTER — PREP FOR PROCEDURE (OUTPATIENT)
Dept: SURGERY | Facility: CLINIC | Age: 44
End: 2024-01-25

## 2024-01-25 DIAGNOSIS — K40.20 BILATERAL INGUINAL HERNIA: Primary | ICD-10-CM

## 2024-01-30 ENCOUNTER — OFFICE VISIT (OUTPATIENT)
Dept: SURGERY | Facility: CLINIC | Age: 44
End: 2024-01-30
Payer: COMMERCIAL

## 2024-01-30 VITALS
SYSTOLIC BLOOD PRESSURE: 139 MMHG | HEIGHT: 71 IN | DIASTOLIC BLOOD PRESSURE: 88 MMHG | BODY MASS INDEX: 33.6 KG/M2 | HEART RATE: 77 BPM | WEIGHT: 240 LBS

## 2024-01-30 DIAGNOSIS — K40.20 BILATERAL INGUINAL HERNIA WITHOUT OBSTRUCTION OR GANGRENE: Primary | ICD-10-CM

## 2024-01-30 PROCEDURE — 99213 OFFICE O/P EST LOW 20 MIN: CPT | Performed by: SURGERY

## 2024-01-30 NOTE — PROGRESS NOTES
Assessment/Plan: Patient presents with bilateral inguinal hernias.  This is noted on ultrasound.  On clinical exam the left is greater than the right.  He has had intermittent left inguinal discomfort.  He desires to undergo repair.    Robotic assisted laparoscopic bilateral inguinal hernia repair is recommended.  Risks and benefits to surgery discussed.  He is in agreement.    He is also not seen by urology for chronic intermittent left testicular pain.  I expressed that I believe this discomfort is not related to the left inguinal hernia.  His pain is worse with sitting and improved with standing.  It is intermittent in nature and may be associated with Valsalva.    There are no diagnoses linked to this encounter.    Subjective:      Patient ID: Bennie Finn is a 43 y.o. male.    Patient presents for follow up.  States he still has pain LLQ.  Denies bulge.  Saw urology 12/28/2023.  Ultrasound groin/inguinal area 1/13/2024   FINDINGS:  There are findings for fat-containing inguinal hernias bilaterally more pronounced with Valsalva maneuver.  On the left, this measures 5.7 x 4.4 x 4.9 cm. Neck measures 2.3 cm.  On the right, this measures 5.3 x 3.0 x 3.5 cm. Neck measures 1.6 cm.  IMPRESSION:  Fat-containing bilateral inguinal hernias as noted above.               The following portions of the patient's history were reviewed and updated as appropriate:     He  has a past medical history of Cholelithiasis, GERD (gastroesophageal reflux disease), Herniation of intervertebral disc of lumbar spine, and IBS (irritable bowel syndrome).  He  has a past surgical history that includes [place] tooth extraction; Colonoscopy; pr laparoscopy surg cholecystectomy (N/A, 01/09/2017); pr esophagogastroduodenoscopy transoral diagnostic (N/A, 05/02/2019); Upper gastrointestinal endoscopy; and Cholecystectomy.  His family history includes Arthritis in his mother; Coronary artery disease in his maternal grandfather; Heart attack  "in his father; Heart disease in his father; Hyperlipidemia in his father and mother; Hypertension in his father; Nephrolithiasis in his mother; Stroke in his father; Sudden death in his father.  He  reports that he has never smoked. He has never used smokeless tobacco. He reports current alcohol use. He reports that he does not use drugs.  Current Outpatient Medications   Medication Sig Dispense Refill    Loperamide HCl (IMODIUM PO) Take by mouth       No current facility-administered medications for this visit.     He is allergic to other..    Review of Systems   Constitutional: Negative.  Negative for activity change.   HENT: Negative.     Eyes: Negative.    Respiratory: Negative.     Cardiovascular: Negative.    Gastrointestinal:  Positive for abdominal pain.   Endocrine: Negative.    Genitourinary:  Positive for testicular pain.   Musculoskeletal: Negative.    Skin: Negative.    Allergic/Immunologic: Negative.    Neurological: Negative.    Psychiatric/Behavioral:  Negative for agitation, behavioral problems and confusion. The patient is not nervous/anxious.    All other systems reviewed and are negative.        Objective:      /88   Pulse 77   Ht 5' 11\" (1.803 m)   Wt 109 kg (240 lb)   BMI 33.47 kg/m²          Physical Exam  Constitutional:       Appearance: Normal appearance.   Eyes:      Extraocular Movements: Extraocular movements intact.   Cardiovascular:      Rate and Rhythm: Normal rate and regular rhythm.   Pulmonary:      Effort: Pulmonary effort is normal.      Breath sounds: Normal breath sounds.   Abdominal:      Hernia: A hernia (Bilateral inguinal hernia.  Left greater than right) is present.   Skin:     General: Skin is warm and dry.         "

## 2024-02-21 NOTE — PRE-PROCEDURE INSTRUCTIONS
Pre-Surgery Instructions:   Medication Instructions    Loperamide HCl (IMODIUM PO) Uses PRN- OK to take day of surgery      Medication instructions for day surgery reviewed. Please use only a sip of water to take your instructed medications. Avoid all over the counter vitamins, supplements and NSAIDS for one week prior to surgery per anesthesia guidelines. Tylenol is ok to take as needed.     You will receive a call one business day prior to surgery with an arrival time and hospital directions. If your surgery is scheduled on a Monday, the hospital will be calling you on the Friday prior to your surgery. If you have not heard from anyone by 8pm, please call the hospital supervisor through the hospital  at [phone]. ([place] [phone] or [place] [phone]).    Do not eat or drink anything after midnight the night before your surgery, including candy, mints, lifesavers, or chewing gum. Do not drink alcohol 24hrs before your surgery. Try not to smoke at least 24hrs before your surgery.       Follow the pre surgery showering instructions as listed in the “My Surgical Experience Booklet” or otherwise provided by your surgeon's office. Do not use a blade to shave the surgical area 1 week before surgery. It is okay to use a clean electric clippers up to 24 hours before surgery. Do not apply any lotions, creams, including makeup, cologne, deodorant, or perfumes after showering on the day of your surgery. Do not use dry shampoo, hair spray, hair gel, or any type of hair products.     No contact lenses, eye make-up, or artificial eyelashes. Remove nail polish, including gel polish, and any artificial, gel, or acrylic nails if possible. Remove all jewelry including rings and body piercing jewelry.     Wear causal clothing that is easy to take on and off. Consider your type of surgery.    Keep any valuables, jewelry, piercings at home. Please bring any specially ordered equipment (sling, braces) if  indicated.    Arrange for a responsible person to drive you to and from the hospital on the day of your surgery. Please confirm the visitor policy for the day of your procedure when you receive your phone call with an arrival time.     Call the surgeon's office with any new illnesses, exposures, or additional questions prior to surgery.    Please reference your “My Surgical Experience Booklet” for additional information to prepare for your upcoming surgery.

## 2024-02-26 RX ORDER — OXYCODONE HYDROCHLORIDE AND ACETAMINOPHEN 5; 325 MG/1; MG/1
1 TABLET ORAL EVERY 4 HOURS PRN
Qty: 10 TABLET | Refills: 0 | Status: SHIPPED | OUTPATIENT
Start: 2024-02-26

## 2024-02-27 NOTE — DISCHARGE INSTR - AVS FIRST PAGE
Please call the office when you leave to schedule an appointment for 2 weeks.              Please call [phone]. [address], [address], [place], [address]. [address] between [place] and [address].     Activity:    May lift 10 lb as many times as desired the 1st week,       20 lb in 2 weeks,       30 lb in 3 weeks.                Walking is encouraged  Normal daily activities including climbing steps are okay  Do not engage in strenuous activity ( sit-ups or crutches) or contact sports for 4-6 weeks post-operatively    Return to Work:   Okay to return to work when you feel well if you desire.        Diet:   You may return to your normal healthy diet.    Wound Care:  Your wound is closed with dissolvable stitches and glue.  It is okay to shower. Wash incision gently with soap and water and pat dry. Do not soak incisions in bath water or swim for two weeks. Do not apply any creams or ointments.    Pain Medication:   Please take as directed if needed. May use Advil or Motrin in addition.  Recall, the pain medicine and anesthesia is associated with constipation.    No driving while taking narcotic pain medications.      Other:  It is normal to developed a “healing ridge” / firm incision after surgery.  This is your body making scar tissue.  It is a good sign  Constipation is very common after general anesthesia.  Please use milk of magnesia as needed in order to help prevent constipation.  It is normal to get bruising after surgery.  If you have questions after discharge please call the office.    If you have increased pain, fever >101.5, increased drainage, redness or a bad smell at your surgery site, please call us immediately or come directly to the Emergency Room

## 2024-02-29 ENCOUNTER — ANESTHESIA EVENT (OUTPATIENT)
Dept: PERIOP | Facility: HOSPITAL | Age: 44
End: 2024-02-29
Payer: COMMERCIAL

## 2024-02-29 NOTE — ANESTHESIA PREPROCEDURE EVALUATION
Procedure:  REPAIR HERNIA INGUINAL LAPAROSCOPIC W/ ROBOTICS (Bilateral: Groin)    Relevant Problems   ANESTHESIA (within normal limits)      GI/HEPATIC   (+) Gastroesophageal reflux disease without esophagitis      Digestive   (+) Irritable bowel syndrome with diarrhea      Other   (+) Bilateral inguinal hernia without obstruction or gangrene   (+) [place] esophagus   (+) Left groin pain        Physical Exam    Airway    Mallampati score: II  TM Distance: >3 FB  Neck ROM: full     Dental   No notable dental hx     Cardiovascular  Rhythm: regular, Rate: normal, Cardiovascular exam normal    Pulmonary  Pulmonary exam normal Breath sounds clear to auscultation    Other Findings        Anesthesia Plan  ASA Score- 2     Anesthesia Type- general with ASA Monitors.         Additional Monitors:     Airway Plan: ETT.           Plan Factors-Exercise tolerance (METS): >4 METS.    Chart reviewed.   Existing labs reviewed. Patient summary reviewed.    Patient is not a current smoker.              Induction- intravenous.    Postoperative Plan- Plan for postoperative opioid use. Planned trial extubation    Informed Consent- Anesthetic plan and risks discussed with patient.  I personally reviewed this patient with the CRNA. Discussed and agreed on the Anesthesia Plan with the CRNA..

## 2024-03-01 ENCOUNTER — ANESTHESIA (OUTPATIENT)
Dept: PERIOP | Facility: HOSPITAL | Age: 44
End: 2024-03-01
Payer: COMMERCIAL

## 2024-03-01 ENCOUNTER — HOSPITAL ENCOUNTER (OUTPATIENT)
Facility: HOSPITAL | Age: 44
Setting detail: OUTPATIENT SURGERY
Discharge: HOME | End: 2024-03-01
Attending: SURGERY | Admitting: SURGERY
Payer: COMMERCIAL

## 2024-03-01 VITALS
DIASTOLIC BLOOD PRESSURE: 69 MMHG | SYSTOLIC BLOOD PRESSURE: 110 MMHG | BODY MASS INDEX: 32.76 KG/M2 | TEMPERATURE: 96.9 F | WEIGHT: 234 LBS | HEIGHT: 71 IN | RESPIRATION RATE: 18 BRPM | HEART RATE: 75 BPM | OXYGEN SATURATION: 95 %

## 2024-03-01 DIAGNOSIS — K40.20 NON-RECURRENT BILATERAL INGUINAL HERNIA WITHOUT OBSTRUCTION OR GANGRENE: Primary | ICD-10-CM

## 2024-03-01 PROCEDURE — C1781 MESH (IMPLANTABLE): HCPCS | Performed by: SURGERY

## 2024-03-01 PROCEDURE — NC001 PR NO CHARGE: Performed by: SURGERY

## 2024-03-01 PROCEDURE — 49650 LAP ING HERNIA REPAIR INIT: CPT | Performed by: SURGERY

## 2024-03-01 PROCEDURE — 49650 LAP ING HERNIA REPAIR INIT: CPT | Performed by: PHYSICIAN ASSISTANT

## 2024-03-01 DEVICE — BARD 3DMAX MESH LEFT LARGE
Type: IMPLANTABLE DEVICE | Site: INGUINAL | Status: FUNCTIONAL
Brand: BARD 3DMAX MESH

## 2024-03-01 DEVICE — BARD 3DMAX MESH RIGHT LARGE
Type: IMPLANTABLE DEVICE | Site: INGUINAL | Status: FUNCTIONAL
Brand: BARD 3DMAX MESH

## 2024-03-01 RX ORDER — ROCURONIUM BROMIDE 10 MG/ML
INJECTION, SOLUTION INTRAVENOUS AS NEEDED
Status: DISCONTINUED | OUTPATIENT
Start: 2024-03-01 | End: 2024-03-01

## 2024-03-01 RX ORDER — HYDROMORPHONE HCL/PF 1 MG/ML
0.5 SYRINGE (ML) INJECTION
Status: DISCONTINUED | OUTPATIENT
Start: 2024-03-01 | End: 2024-03-01 | Stop reason: HOSPADM

## 2024-03-01 RX ORDER — SODIUM CHLORIDE, SODIUM LACTATE, POTASSIUM CHLORIDE, CALCIUM CHLORIDE 600; 310; 30; 20 MG/100ML; MG/100ML; MG/100ML; MG/100ML
INJECTION, SOLUTION INTRAVENOUS CONTINUOUS PRN
Status: DISCONTINUED | OUTPATIENT
Start: 2024-03-01 | End: 2024-03-01

## 2024-03-01 RX ORDER — ONDANSETRON 2 MG/ML
4 INJECTION INTRAMUSCULAR; INTRAVENOUS EVERY 4 HOURS PRN
Status: DISCONTINUED | OUTPATIENT
Start: 2024-03-01 | End: 2024-03-01 | Stop reason: HOSPADM

## 2024-03-01 RX ORDER — HYDROMORPHONE HCL/PF 1 MG/ML
SYRINGE (ML) INJECTION AS NEEDED
Status: DISCONTINUED | OUTPATIENT
Start: 2024-03-01 | End: 2024-03-01

## 2024-03-01 RX ORDER — LABETALOL HYDROCHLORIDE 5 MG/ML
10 INJECTION, SOLUTION INTRAVENOUS
Status: DISCONTINUED | OUTPATIENT
Start: 2024-03-01 | End: 2024-03-01 | Stop reason: HOSPADM

## 2024-03-01 RX ORDER — FUROSEMIDE 10 MG/ML
INJECTION INTRAMUSCULAR; INTRAVENOUS AS NEEDED
Status: DISCONTINUED | OUTPATIENT
Start: 2024-03-01 | End: 2024-03-01

## 2024-03-01 RX ORDER — HYDROMORPHONE HCL IN WATER/PF 6 MG/30 ML
0.2 PATIENT CONTROLLED ANALGESIA SYRINGE INTRAVENOUS
Status: DISCONTINUED | OUTPATIENT
Start: 2024-03-01 | End: 2024-03-01 | Stop reason: HOSPADM

## 2024-03-01 RX ORDER — SODIUM CHLORIDE, SODIUM LACTATE, POTASSIUM CHLORIDE, CALCIUM CHLORIDE 600; 310; 30; 20 MG/100ML; MG/100ML; MG/100ML; MG/100ML
125 INJECTION, SOLUTION INTRAVENOUS CONTINUOUS
Status: DISCONTINUED | OUTPATIENT
Start: 2024-03-01 | End: 2024-03-01 | Stop reason: HOSPADM

## 2024-03-01 RX ORDER — ONDANSETRON 2 MG/ML
INJECTION INTRAMUSCULAR; INTRAVENOUS AS NEEDED
Status: DISCONTINUED | OUTPATIENT
Start: 2024-03-01 | End: 2024-03-01

## 2024-03-01 RX ORDER — OXYCODONE HYDROCHLORIDE AND ACETAMINOPHEN 5; 325 MG/1; MG/1
1 TABLET ORAL EVERY 4 HOURS PRN
Status: DISCONTINUED | OUTPATIENT
Start: 2024-03-01 | End: 2024-03-01 | Stop reason: HOSPADM

## 2024-03-01 RX ORDER — DEXAMETHASONE SODIUM PHOSPHATE 10 MG/ML
INJECTION, SOLUTION INTRAMUSCULAR; INTRAVENOUS AS NEEDED
Status: DISCONTINUED | OUTPATIENT
Start: 2024-03-01 | End: 2024-03-01

## 2024-03-01 RX ORDER — KETOROLAC TROMETHAMINE 30 MG/ML
INJECTION, SOLUTION INTRAMUSCULAR; INTRAVENOUS AS NEEDED
Status: DISCONTINUED | OUTPATIENT
Start: 2024-03-01 | End: 2024-03-01

## 2024-03-01 RX ORDER — ONDANSETRON 2 MG/ML
4 INJECTION INTRAMUSCULAR; INTRAVENOUS ONCE AS NEEDED
Status: DISCONTINUED | OUTPATIENT
Start: 2024-03-01 | End: 2024-03-01 | Stop reason: HOSPADM

## 2024-03-01 RX ORDER — HYDRALAZINE HYDROCHLORIDE 20 MG/ML
5 INJECTION INTRAMUSCULAR; INTRAVENOUS
Status: DISCONTINUED | OUTPATIENT
Start: 2024-03-01 | End: 2024-03-01 | Stop reason: HOSPADM

## 2024-03-01 RX ORDER — CEFAZOLIN SODIUM 2 G/50ML
2000 SOLUTION INTRAVENOUS ONCE
Status: COMPLETED | OUTPATIENT
Start: 2024-03-01 | End: 2024-03-01

## 2024-03-01 RX ORDER — METOCLOPRAMIDE HYDROCHLORIDE 5 MG/ML
10 INJECTION INTRAMUSCULAR; INTRAVENOUS ONCE AS NEEDED
Status: DISCONTINUED | OUTPATIENT
Start: 2024-03-01 | End: 2024-03-01 | Stop reason: HOSPADM

## 2024-03-01 RX ORDER — FENTANYL CITRATE/PF 50 MCG/ML
25 SYRINGE (ML) INJECTION
Status: DISCONTINUED | OUTPATIENT
Start: 2024-03-01 | End: 2024-03-01 | Stop reason: HOSPADM

## 2024-03-01 RX ORDER — MIDAZOLAM HYDROCHLORIDE 2 MG/2ML
INJECTION, SOLUTION INTRAMUSCULAR; INTRAVENOUS AS NEEDED
Status: DISCONTINUED | OUTPATIENT
Start: 2024-03-01 | End: 2024-03-01

## 2024-03-01 RX ORDER — ALBUTEROL SULFATE 2.5 MG/3ML
2.5 SOLUTION RESPIRATORY (INHALATION) ONCE AS NEEDED
Status: DISCONTINUED | OUTPATIENT
Start: 2024-03-01 | End: 2024-03-01 | Stop reason: HOSPADM

## 2024-03-01 RX ORDER — LIDOCAINE HYDROCHLORIDE 10 MG/ML
INJECTION, SOLUTION EPIDURAL; INFILTRATION; INTRACAUDAL; PERINEURAL AS NEEDED
Status: DISCONTINUED | OUTPATIENT
Start: 2024-03-01 | End: 2024-03-01

## 2024-03-01 RX ORDER — BUPIVACAINE HYDROCHLORIDE 2.5 MG/ML
INJECTION, SOLUTION EPIDURAL; INFILTRATION; INTRACAUDAL AS NEEDED
Status: DISCONTINUED | OUTPATIENT
Start: 2024-03-01 | End: 2024-03-01 | Stop reason: HOSPADM

## 2024-03-01 RX ORDER — PROPOFOL 10 MG/ML
INJECTION, EMULSION INTRAVENOUS AS NEEDED
Status: DISCONTINUED | OUTPATIENT
Start: 2024-03-01 | End: 2024-03-01

## 2024-03-01 RX ORDER — PROMETHAZINE HYDROCHLORIDE 25 MG/ML
12.5 INJECTION, SOLUTION INTRAMUSCULAR; INTRAVENOUS ONCE AS NEEDED
Status: DISCONTINUED | OUTPATIENT
Start: 2024-03-01 | End: 2024-03-01 | Stop reason: HOSPADM

## 2024-03-01 RX ORDER — MAGNESIUM HYDROXIDE 1200 MG/15ML
LIQUID ORAL AS NEEDED
Status: DISCONTINUED | OUTPATIENT
Start: 2024-03-01 | End: 2024-03-01 | Stop reason: HOSPADM

## 2024-03-01 RX ORDER — FENTANYL CITRATE 50 UG/ML
INJECTION, SOLUTION INTRAMUSCULAR; INTRAVENOUS AS NEEDED
Status: DISCONTINUED | OUTPATIENT
Start: 2024-03-01 | End: 2024-03-01

## 2024-03-01 RX ADMIN — LIDOCAINE HYDROCHLORIDE 50 MG: 10 INJECTION, SOLUTION EPIDURAL; INFILTRATION; INTRACAUDAL; PERINEURAL at 07:36

## 2024-03-01 RX ADMIN — ROCURONIUM BROMIDE 50 MG: 10 INJECTION, SOLUTION INTRAVENOUS at 07:36

## 2024-03-01 RX ADMIN — DEXAMETHASONE SODIUM PHOSPHATE 10 MG: 10 INJECTION, SOLUTION INTRAMUSCULAR; INTRAVENOUS at 07:40

## 2024-03-01 RX ADMIN — SODIUM CHLORIDE, SODIUM LACTATE, POTASSIUM CHLORIDE, AND CALCIUM CHLORIDE: .6; .31; .03; .02 INJECTION, SOLUTION INTRAVENOUS at 07:17

## 2024-03-01 RX ADMIN — ONDANSETRON 4 MG: 2 INJECTION INTRAMUSCULAR; INTRAVENOUS at 07:31

## 2024-03-01 RX ADMIN — ROCURONIUM BROMIDE 10 MG: 10 INJECTION, SOLUTION INTRAVENOUS at 08:51

## 2024-03-01 RX ADMIN — SODIUM CHLORIDE, SODIUM LACTATE, POTASSIUM CHLORIDE, AND CALCIUM CHLORIDE: .6; .31; .03; .02 INJECTION, SOLUTION INTRAVENOUS at 09:33

## 2024-03-01 RX ADMIN — FENTANYL CITRATE 100 MCG: 50 INJECTION INTRAMUSCULAR; INTRAVENOUS at 07:36

## 2024-03-01 RX ADMIN — DEXMEDETOMIDINE HYDROCHLORIDE 8 MCG: 100 INJECTION, SOLUTION INTRAVENOUS at 08:08

## 2024-03-01 RX ADMIN — CEFAZOLIN SODIUM 2000 MG: 2 SOLUTION INTRAVENOUS at 07:31

## 2024-03-01 RX ADMIN — HYDROMORPHONE HYDROCHLORIDE 0.5 MG: 1 INJECTION, SOLUTION INTRAMUSCULAR; INTRAVENOUS; SUBCUTANEOUS at 08:15

## 2024-03-01 RX ADMIN — ROCURONIUM BROMIDE 20 MG: 10 INJECTION, SOLUTION INTRAVENOUS at 08:08

## 2024-03-01 RX ADMIN — FUROSEMIDE 5 MG: 10 INJECTION, SOLUTION INTRAMUSCULAR; INTRAVENOUS at 09:52

## 2024-03-01 RX ADMIN — SUGAMMADEX 200 MG: 100 INJECTION, SOLUTION INTRAVENOUS at 09:49

## 2024-03-01 RX ADMIN — PROPOFOL 30 MG: 10 INJECTION, EMULSION INTRAVENOUS at 09:49

## 2024-03-01 RX ADMIN — HYDROMORPHONE HYDROCHLORIDE 0.5 MG: 1 INJECTION, SOLUTION INTRAMUSCULAR; INTRAVENOUS; SUBCUTANEOUS at 09:50

## 2024-03-01 RX ADMIN — HYDROMORPHONE HYDROCHLORIDE 0.5 MG: 1 INJECTION, SOLUTION INTRAMUSCULAR; INTRAVENOUS; SUBCUTANEOUS at 08:51

## 2024-03-01 RX ADMIN — PROPOFOL 150 MG: 10 INJECTION, EMULSION INTRAVENOUS at 07:36

## 2024-03-01 RX ADMIN — KETOROLAC TROMETHAMINE 15 MG: 30 INJECTION, SOLUTION INTRAMUSCULAR; INTRAVENOUS at 08:28

## 2024-03-01 RX ADMIN — MIDAZOLAM 2 MG: 1 INJECTION INTRAMUSCULAR; INTRAVENOUS at 07:31

## 2024-03-01 RX ADMIN — DEXMEDETOMIDINE HYDROCHLORIDE 12 MCG: 100 INJECTION, SOLUTION INTRAVENOUS at 07:47

## 2024-03-01 NOTE — PROGRESS NOTES
General Surgical Care   Bennie Finn 43 y.o. male MRN: [id number]  Unit/Bed#: [facility] Encounter: [id number]          ASSESSMENT: bilateral inguinal hernias     PLAN: bilateral inguinal hernias repair with robot         Review of Systems  Constitutional:  Denies fever or chills   Eyes:  Denies change in visual acuity   HENT:  Denies nasal congestion or sore throat   Respiratory:  Denies cough or shortness of breath   Cardiovascular:  Denies chest pain or edema   GI:  Denies abdominal pain, nausea, vomiting, bloody stools or diarrhea   Musculoskeletal:  Denies back pain or joint pain   Integument:  Denies rash   Neurologic:  Denies headache, focal weakness or sensory changes   Endocrine:  Denies polyuria or polydipsia   Lymphatic:  Denies swollen glands   Psychiatric:  Denies depression or anxiety     Historical Information   Past Medical History:   Diagnosis Date    Cholelithiasis     GERD (gastroesophageal reflux disease)     Herniation of intervertebral disc of lumbar spine     IBS (irritable bowel syndrome)      Past Surgical History:   Procedure Laterality Date    CHOLECYSTECTOMY      COLONOSCOPY      [place] ESOPHAGOGASTRODUODENOSCOPY TRANSORAL DIAGNOSTIC N/A 05/02/2019    Procedure: ESOPHAGOGASTRODUODENOSCOPY (EGD);  Surgeon: Heber Mckee MD;  Location: [facility];  Service: Gastroenterology    [place] LAPAROSCOPY SURG CHOLECYSTECTOMY N/A 01/09/2017    Procedure: CHOLECYSTECTOMY LAPAROSCOPIC;  Surgeon: Ming Washington MD;  Location: [facility] OR;  Service: General    UPPER GASTROINTESTINAL ENDOSCOPY      WISDOM TOOTH EXTRACTION       Social History   Social History     Substance and Sexual Activity   Alcohol Use Yes    Comment: occassionally on holidays     Social History     Substance and Sexual Activity   Drug Use No     Social History     Tobacco Use   Smoking Status Never   Smokeless Tobacco Never     Family History   Problem Relation Age of Onset    Nephrolithiasis Mother     Arthritis Mother      "Hyperlipidemia Mother     Heart attack Father     Hypertension Father     Stroke Father     Sudden death Father     Hyperlipidemia Father     Heart disease Father     Coronary artery disease Maternal Grandfather     Alcohol abuse Neg Hx     Substance Abuse Neg Hx     Mental illness Neg Hx     Colon cancer Neg Hx     Colon polyps Neg Hx        Meds/Allergies     Medications Prior to Admission   Medication    Loperamide HCl (IMODIUM PO)     Current Facility-Administered Medications   Medication Dose Route Frequency    ceFAZolin (ANCEF) IVPB (premix in dextrose) 2,000 mg 50 mL  2,000 mg Intravenous Once     Facility-Administered Medications Ordered in Other Encounters   Medication Dose Route Frequency    lactated ringers infusion   Intravenous Continuous PRN       Allergies   Allergen Reactions    Other      Annotation - [id number]: poison ivy/oak       Objective     Blood pressure 157/81, pulse 85, temperature 97.5 °F (36.4 °C), temperature source Temporal, resp. rate 18, height 5' 11\" (1.803 m), weight 106 kg (234 lb), SpO2 97%.    No intake or output data in the 24 hours ending 03/01/24 0726    PHYSICAL EXAM  General appearance: alert and oriented, in no acute distress  Lungs: clear to auscultation bilaterally  Heart: regular rate and rhythm, S1, S2 normal, no murmur, click, rub or gallop  Abdomen: soft, non-tender; bowel sounds normal; no masses,  no organomegaly  Skin: Skin color, texture, turgor normal. No rashes or lesions    Lab Results:   No visits with results within 1 Day(s) from this visit.   Latest known visit with results is:   Appointment on 09/05/2023   Component Date Value    Cholesterol 09/05/2023 171     Triglycerides 09/05/2023 81     HDL, Direct 09/05/2023 44     LDL Calculated 09/05/2023 111 (H)     Sodium 09/05/2023 139     Potassium 09/05/2023 4.2     Chloride 09/05/2023 104     CO2 09/05/2023 25     ANION GAP 09/05/2023 10     BUN 09/05/2023 11     Creatinine 09/05/2023 1.04     Glucose, Fasting " 09/05/2023 92     Calcium 09/05/2023 9.0     AST 09/05/2023 37     ALT 09/05/2023 48     Alkaline Phosphatase 09/05/2023 71     Total Protein 09/05/2023 6.7     Albumin 09/05/2023 4.2     Total Bilirubin 09/05/2023 0.97     eGFR 09/05/2023 86     WBC 09/05/2023 5.33     RBC 09/05/2023 4.86     Hemoglobin 09/05/2023 14.5     Hematocrit 09/05/2023 42.7     MCV 09/05/2023 88     MCH 09/05/2023 29.8     MCHC 09/05/2023 34.0     RDW 09/05/2023 13.1     MPV 09/05/2023 11.7     Platelets 09/05/2023 292     nRBC 09/05/2023 0     Neutrophils Relative 09/05/2023 66     Immat GRANS % 09/05/2023 0     Lymphocytes Relative 09/05/2023 23     Monocytes Relative 09/05/2023 8     Eosinophils Relative 09/05/2023 2     Basophils Relative 09/05/2023 1     Neutrophils Absolute 09/05/2023 3.56     Immature Grans Absolute 09/05/2023 0.01     Lymphocytes Absolute 09/05/2023 1.20     Monocytes Absolute 09/05/2023 0.44     Eosinophils Absolute 09/05/2023 0.09     Basophils Absolute 09/05/2023 0.03     TSH 3RD GENERATON 09/05/2023 2.994      Imaging Studies: I have personally reviewed pertinent reports.    No results found.     Counseling / Coordination of Care  Total time spent today  15 minutes. Greater than 50% of total time was spent with the patient and / or family counseling and / or coordination of care.

## 2024-03-01 NOTE — INTERIM OP NOTE
REPAIR HERNIA INGUINAL LAPAROSCOPIC W/ ROBOTICS  Postoperative Note  PATIENT NAME: Bennie Finn  : 1980  MRN: [id number]  [facility] OR ROOM 03    Surgery Date: 3/1/2024    Preop Diagnosis:  Bilateral inguinal hernia [K40.20]    Post-Op Diagnosis Codes:     * Bilateral inguinal hernia [K40.20]    Procedure(s) (LRB):  REPAIR HERNIA INGUINAL LAPAROSCOPIC W/ ROBOTICS (Bilateral)    Surgeons and Role:     * Benito Varner MD - Primary     * Cecelia Mccray PA-C - Assisting    Specimens:  * No specimens in log *    Estimated Blood Loss:   Minimal    Anesthesia Type:   General     Findings:      Complications:         SIGNATURE: Benito Varner MD   DATE: 2024   TIME: 10:02 AM

## 2024-03-01 NOTE — OP NOTE
OPERATIVE REPORT  PATIENT NAME: Bennie Finn    :  1980  MRN: [id number]  Pt Location: [facility] ROOM 03    SURGERY DATE: 3/1/2024    Surgeons and Role:     * Benito Varner MD - Primary     * Cecelia Mccray PA-C - Assisting as no qualified surgical resident is available.  Her assistance is needed for exposure and retraction.    Preop Diagnosis:  Bilateral inguinal hernia [K40.20]    Post-Op Diagnosis Codes:     * Bilateral inguinal hernia [K40.20]    Procedure(s):  Bilateral - REPAIR HERNIA INGUINAL LAPAROSCOPIC W/ ROBOTICS    Specimen(s):  * No specimens in log *    Estimated Blood Loss:   Minimal    Drains:  * No LDAs found *    Anesthesia Type:   General    Operative Indications:  Bilateral inguinal hernia [K40.20]    Independent, non-smoker, ASA 2, wound class I, BMI 33, weight 230, height 71    Gastroesophageal reflux disease without esophagitis        Digestive   (+) Irritable bowel syndrome with diarrhea       Other   (+) Bilateral inguinal hernia without obstruction or gangrene   (+) Jackhammer esophagus   (+) Left groin pain             Patient was identified visually and by armband.  Placed in supine position.  After anesthesia the abdomen was prepped and draped in sterile fashion.  A 12 mm trocar was inserted above the umbilicus.  This was followed by CO2 insufflation.  2 additional robotic trocar insertions were placed under direct vision after Marcaine instillation.  Laparoscopic visualization revealed adhesions between the sigmoid colon and the left lower quadrant of the abdomen.   The mesh and suture for the right inguinal region were then introduced.    The patient was placed in steep Trendelenburg position.  The robot was blocked brought to the field and instrumentation was docked.  I left the robot patient region and went to the console.    Peritoneum was incised over the right inguinal region over the internal oblique and inferiorly.  Dissection was carried through the  preperitoneal tissues inferiorly.  Flaps were created.  Aadma's ligament, iliac vessels, deep epigastric vessels, cord structures and internal ring were identified.  A direct inguinal hernia was found.    Polypropylene mesh was introduced into the field.  This was sewn in place with interrupted 0 Vicryl suture along Adama's ligament and over the direct inguinal defect.  The peritoneum was reapproximated with running suture.    At this point the left inguinal region was approached.  In similar fashion the peritoneum was incised.  Dissection continued down to Adama's ligament.  The vascular structures were explored.  A direct inguinal hernia was also noted.  This was reduced.  Polypropylene mesh was then placed.  This was also pexied.    Peritoneum was reapproximated with running suture.  At this point all suture material was removed from the pelvis.  Suture count correct.  Instrumentations were removed.  The midline was closed with interrupted 0 Vicryl suture followed by 4-0 Monocryl throughout.  The patient tolerated this procedure well.  Sponge and instrument count correct x 2.      Complications:   None   I was present for the entire procedure.    Patient Disposition:  PACU         SIGNATURE: Benito Varner MD  DATE: March 1, 2024  TIME: 1:32 PM

## 2024-03-01 NOTE — ANESTHESIA POSTPROCEDURE EVALUATION
Post-Op Assessment Note    CV Status:  Stable  Pain Score: 0    Pain management: adequate       Mental Status:  Sleepy   Hydration Status:  Euvolemic   PONV Controlled:  Controlled   Airway Patency:  Patent     Post Op Vitals Reviewed: Yes    No anethesia notable event occurred.    Staff: CRNA               BP   137/78   Temp      Pulse  85   Resp   14   SpO2   98

## 2024-03-18 ENCOUNTER — OFFICE VISIT (OUTPATIENT)
Dept: SURGERY | Facility: CLINIC | Age: 44
End: 2024-03-18

## 2024-03-18 DIAGNOSIS — K40.20 NON-RECURRENT BILATERAL INGUINAL HERNIA WITHOUT OBSTRUCTION OR GANGRENE: Primary | ICD-10-CM

## 2024-03-18 PROCEDURE — 99024 POSTOP FOLLOW-UP VISIT: CPT | Performed by: SURGERY

## 2024-03-18 NOTE — PROGRESS NOTES
Assessment/Plan: Patient is status post laparoscopic bilateral inguinal hernia repair.  Overall he feels well.  He offers no complaints.  He is advance his activities well.  Incisions are clean and dry.  All questions answered.    There are no diagnoses linked to this encounter.    Pathology:       Postoperative restrictions reviewed. All questions answered.       ______________________________________________________  HPI: Patient presents post operatively.  Bilateral inguinal hernia repair laparoscopic with robotics 3/1/2024.               ROS:  General ROS: negative for - chills, fatigue, fever or night sweats, weight loss  Respiratory ROS: no cough, shortness of breath, or wheezing  Cardiovascular ROS: no chest pain or dyspnea on exertion  Genito-Urinary ROS: no dysuria, trouble voiding, or hematuria  Musculoskeletal ROS: negative for - gait disturbance, joint pain or muscle pain  Neurological ROS: no TIA or stroke symptoms  GI ROS: see HPI  Skin ROS: no new rashes or lesions   Lymphatic ROS: no new adenopathy noted by pt.   GYN ROS: see HPI, no new GYN history or bleeding noted  Psy ROS: no new mental or behavioral disturbances         Patient Active Problem List   Diagnosis    Gastroesophageal reflux disease without esophagitis    Irritable bowel syndrome with diarrhea    Jackhammer esophagus    LFT elevation    Left groin pain    Left testicular pain    Bilateral inguinal hernia without obstruction or gangrene       Allergies:  Other      Current Outpatient Medications:     Loperamide HCl (IMODIUM PO), Take by mouth, Disp: , Rfl:     oxyCODONE-acetaminophen (PERCOCET) 5-325 mg per tablet, Take 1 tablet by mouth every 4 (four) hours as needed for moderate pain for up to 10 doses Max Daily Amount: 6 tablets, Disp: 10 tablet, Rfl: 0    Past Medical History:   Diagnosis Date    Cholelithiasis     GERD (gastroesophageal reflux disease)     Herniation of intervertebral disc of lumbar spine     IBS (irritable bowel  syndrome)        Past Surgical History:   Procedure Laterality Date    CHOLECYSTECTOMY      COLONOSCOPY      [place] ESOPHAGOGASTRODUODENOSCOPY TRANSORAL DIAGNOSTIC N/A 05/02/2019    Procedure: ESOPHAGOGASTRODUODENOSCOPY (EGD);  Surgeon: Heber Mckee MD;  Location: [facility];  Service: Gastroenterology    [place] LAPAROSCOPY SURG CHOLECYSTECTOMY N/A 01/09/2017    Procedure: CHOLECYSTECTOMY LAPAROSCOPIC;  Surgeon: Ming Washington MD;  Location: [facility];  Service: General    [place] LAPAROSCOPY SURG RPR INITIAL INGUINAL HERNIA Bilateral 3/1/2024    Procedure: REPAIR HERNIA INGUINAL LAPAROSCOPIC W/ ROBOTICS;  Surgeon: Benito Varner MD;  Location: [facility];  Service: General    UPPER GASTROINTESTINAL ENDOSCOPY      WISDOM TOOTH EXTRACTION         Family History   Problem Relation Age of Onset    Nephrolithiasis Mother     Arthritis Mother     Hyperlipidemia Mother     Heart attack Father     Hypertension Father     Stroke Father     Sudden death Father     Hyperlipidemia Father     Heart disease Father     Coronary artery disease Maternal Grandfather     Alcohol abuse Neg Hx     Substance Abuse Neg Hx     Mental illness Neg Hx     Colon cancer Neg Hx     Colon polyps Neg Hx         reports that he has never smoked. He has never used smokeless tobacco. He reports current alcohol use. He reports that he does not use drugs.    PHYSICAL EXAM    There were no vitals taken for this visit.    General: normal, cooperative, no distress  Abdominal: soft, nondistended, or nontender  Incision: clean, dry, and intact and healing well      Benito Varner MD    Date: 3/18/2024 Time: 9:57 AM

## 2024-07-18 ENCOUNTER — VBI (OUTPATIENT)
Dept: ADMINISTRATIVE | Facility: OTHER | Age: 44
End: 2024-07-18

## 2024-07-18 NOTE — TELEPHONE ENCOUNTER
07/18/24 8:38 AM     Chart reviewed for CRC: Colonoscopy ; nothing is submitted to the patient's insurance at this time.     TERRELL EMERSON [facility]

## 2024-08-01 ENCOUNTER — HOSPITAL ENCOUNTER (EMERGENCY)
Facility: HOSPITAL | Age: 44
Discharge: HOME | End: 2024-08-01
Attending: EMERGENCY MEDICINE
Payer: COMMERCIAL

## 2024-08-01 ENCOUNTER — APPOINTMENT (EMERGENCY)
Dept: CT IMAGING | Facility: HOSPITAL | Age: 44
End: 2024-08-01
Payer: COMMERCIAL

## 2024-08-01 VITALS
HEART RATE: 82 BPM | TEMPERATURE: 97.8 F | SYSTOLIC BLOOD PRESSURE: 121 MMHG | RESPIRATION RATE: 16 BRPM | DIASTOLIC BLOOD PRESSURE: 63 MMHG | OXYGEN SATURATION: 98 %

## 2024-08-01 DIAGNOSIS — N23 RENAL COLIC ON LEFT SIDE: ICD-10-CM

## 2024-08-01 DIAGNOSIS — N20.1 LEFT URETERAL STONE: Primary | ICD-10-CM

## 2024-08-01 DIAGNOSIS — N13.30 HYDRONEPHROSIS: ICD-10-CM

## 2024-08-01 LAB
ALBUMIN SERPL BCG-MCNC: 4.3 G/DL (ref 3.5–5)
ALP SERPL-CCNC: 78 U/L (ref 34–104)
ALT SERPL W P-5'-P-CCNC: 54 U/L (ref 7–52)
ANION GAP SERPL CALCULATED.3IONS-SCNC: 8 MMOL/L (ref 4–13)
AST SERPL W P-5'-P-CCNC: 38 U/L (ref 13–39)
BACTERIA UR QL AUTO: ABNORMAL /HPF
BACTERIA UR QL AUTO: ABNORMAL /HPF
BASOPHILS # BLD AUTO: 0.01 THOUSANDS/ÂΜL (ref 0–0.1)
BASOPHILS NFR BLD AUTO: 0 % (ref 0–1)
BILIRUB SERPL-MCNC: 1.08 MG/DL (ref 0.2–1)
BILIRUB UR QL STRIP: NEGATIVE
BILIRUB UR QL STRIP: NEGATIVE
BUN SERPL-MCNC: 16 MG/DL (ref 5–25)
CALCIUM SERPL-MCNC: 9.2 MG/DL (ref 8.4–10.2)
CHLORIDE SERPL-SCNC: 104 MMOL/L (ref 96–108)
CLARITY UR: CLEAR
CLARITY UR: CLEAR
CO2 SERPL-SCNC: 26 MMOL/L (ref 21–32)
COLOR UR: YELLOW
COLOR UR: YELLOW
CREAT SERPL-MCNC: 1.29 MG/DL (ref 0.6–1.3)
EOSINOPHIL # BLD AUTO: 0.08 THOUSAND/ÂΜL (ref 0–0.61)
EOSINOPHIL NFR BLD AUTO: 2 % (ref 0–6)
ERYTHROCYTE [DISTWIDTH] IN BLOOD BY AUTOMATED COUNT: 12.8 % (ref 11.6–15.1)
GFR SERPL CREATININE-BSD FRML MDRD: 66 ML/MIN/1.73SQ M
GLUCOSE SERPL-MCNC: 169 MG/DL (ref 65–140)
GLUCOSE UR STRIP-MCNC: NEGATIVE MG/DL
GLUCOSE UR STRIP-MCNC: NEGATIVE MG/DL
HCT VFR BLD AUTO: 39.2 % (ref 36.5–49.3)
HGB BLD-MCNC: 13.3 G/DL (ref 12–17)
HGB UR QL STRIP.AUTO: ABNORMAL
HGB UR QL STRIP.AUTO: ABNORMAL
HYALINE CASTS #/AREA URNS LPF: ABNORMAL /LPF
IMM GRANULOCYTES # BLD AUTO: 0.03 THOUSAND/UL (ref 0–0.2)
IMM GRANULOCYTES NFR BLD AUTO: 1 % (ref 0–2)
KETONES UR STRIP-MCNC: NEGATIVE MG/DL
KETONES UR STRIP-MCNC: NEGATIVE MG/DL
LEUKOCYTE ESTERASE UR QL STRIP: NEGATIVE
LEUKOCYTE ESTERASE UR QL STRIP: NEGATIVE
LIPASE SERPL-CCNC: 27 U/L (ref 11–82)
LYMPHOCYTES # BLD AUTO: 1.3 THOUSANDS/ÂΜL (ref 0.6–4.47)
LYMPHOCYTES NFR BLD AUTO: 25 % (ref 14–44)
MCH RBC QN AUTO: 28.8 PG (ref 26.8–34.3)
MCHC RBC AUTO-ENTMCNC: 33.9 G/DL (ref 31.4–37.4)
MCV RBC AUTO: 85 FL (ref 82–98)
MONOCYTES # BLD AUTO: 0.12 THOUSAND/ÂΜL (ref 0.17–1.22)
MONOCYTES NFR BLD AUTO: 2 % (ref 4–12)
MUCOUS THREADS UR QL AUTO: ABNORMAL
MUCOUS THREADS UR QL AUTO: ABNORMAL
NEUTROPHILS # BLD AUTO: 3.59 THOUSANDS/ÂΜL (ref 1.85–7.62)
NEUTS SEG NFR BLD AUTO: 70 % (ref 43–75)
NITRITE UR QL STRIP: NEGATIVE
NITRITE UR QL STRIP: NEGATIVE
NON-SQ EPI CELLS URNS QL MICRO: ABNORMAL /HPF
NON-SQ EPI CELLS URNS QL MICRO: ABNORMAL /HPF
NRBC BLD AUTO-RTO: 0 /100 WBCS
PH UR STRIP.AUTO: 5.5 [PH]
PH UR STRIP.AUTO: 6 [PH]
PLATELET # BLD AUTO: 329 THOUSANDS/UL (ref 149–390)
PMV BLD AUTO: 10.6 FL (ref 8.9–12.7)
POTASSIUM SERPL-SCNC: 3.4 MMOL/L (ref 3.5–5.3)
PROT SERPL-MCNC: 7 G/DL (ref 6.4–8.4)
PROT UR STRIP-MCNC: ABNORMAL MG/DL
PROT UR STRIP-MCNC: NEGATIVE MG/DL
RBC # BLD AUTO: 4.62 MILLION/UL (ref 3.88–5.62)
RBC #/AREA URNS AUTO: ABNORMAL /HPF
RBC #/AREA URNS AUTO: ABNORMAL /HPF
SODIUM SERPL-SCNC: 138 MMOL/L (ref 135–147)
SP GR UR STRIP.AUTO: 1.01 (ref 1–1.03)
SP GR UR STRIP.AUTO: <1.005 (ref 1–1.03)
UROBILINOGEN UR STRIP-ACNC: <2 MG/DL
UROBILINOGEN UR STRIP-ACNC: <2 MG/DL
WBC # BLD AUTO: 5.13 THOUSAND/UL (ref 4.31–10.16)
WBC #/AREA URNS AUTO: ABNORMAL /HPF
WBC #/AREA URNS AUTO: ABNORMAL /HPF

## 2024-08-01 PROCEDURE — 83690 ASSAY OF LIPASE: CPT

## 2024-08-01 PROCEDURE — 74176 CT ABD & PELVIS W/O CONTRAST: CPT

## 2024-08-01 PROCEDURE — 36415 COLL VENOUS BLD VENIPUNCTURE: CPT

## 2024-08-01 PROCEDURE — 96361 HYDRATE IV INFUSION ADD-ON: CPT

## 2024-08-01 PROCEDURE — 81001 URINALYSIS AUTO W/SCOPE: CPT

## 2024-08-01 PROCEDURE — 96360 HYDRATION IV INFUSION INIT: CPT

## 2024-08-01 PROCEDURE — 99284 EMERGENCY DEPT VISIT MOD MDM: CPT

## 2024-08-01 PROCEDURE — 85025 COMPLETE CBC W/AUTO DIFF WBC: CPT

## 2024-08-01 PROCEDURE — 80053 COMPREHEN METABOLIC PANEL: CPT

## 2024-08-01 RX ORDER — KETOROLAC TROMETHAMINE 30 MG/ML
1 INJECTION, SOLUTION INTRAMUSCULAR; INTRAVENOUS ONCE
Status: COMPLETED | OUTPATIENT
Start: 2024-08-01 | End: 2024-08-01

## 2024-08-01 RX ORDER — OXYCODONE HYDROCHLORIDE 5 MG/1
5 TABLET ORAL EVERY 6 HOURS PRN
Qty: 10 TABLET | Refills: 0 | Status: SHIPPED | OUTPATIENT
Start: 2024-08-01

## 2024-08-01 RX ORDER — IBUPROFEN 600 MG/1
TABLET ORAL
Qty: 18 TABLET | Refills: 0 | Status: SHIPPED | OUTPATIENT
Start: 2024-08-01 | End: 2024-08-06

## 2024-08-01 RX ORDER — TAMSULOSIN HYDROCHLORIDE 0.4 MG/1
0.4 CAPSULE ORAL
Qty: 14 CAPSULE | Refills: 0 | Status: SHIPPED | OUTPATIENT
Start: 2024-08-01 | End: 2024-08-15

## 2024-08-01 RX ORDER — ACETAMINOPHEN 500 MG
1000 TABLET ORAL 3 TIMES DAILY PRN
Qty: 30 TABLET | Refills: 0 | Status: SHIPPED | OUTPATIENT
Start: 2024-08-01 | End: 2024-08-06

## 2024-08-01 RX ADMIN — SODIUM CHLORIDE 1000 ML: 0.9 INJECTION, SOLUTION INTRAVENOUS at 10:17

## 2024-08-01 NOTE — ED PROVIDER NOTES
History  Chief Complaint   Patient presents with    Flank Pain     Left flank pain started suddenly today around 0845.  No urinary s/s, no injury     The patient is a 43-year-old male with PMH of cholelithiasis s/p cholecystectomy, GERD, and lower lumbar disc herniation presenting for evaluation of left-sided abdominal pain.  The patient started at 845 (1.5 hours PTA) with a left lateral torso pain while in a morning meeting.  He describes the pain as constant, aching, and notably improved with Toradol which was given by EMS on route here.  He notes having a URI illness last week with lingering dry cough, otherwise no recent fevers, or chills.  He denies associated nausea, vomiting, change in bowel.  He does have diarrhea at baseline related to his IBS-D.  He denies flank pain, urinary urgency or frequency, foul-smelling urine, hematuria, and dysuria.      History provided by:  Patient   used: No        Prior to Admission Medications   Prescriptions Last Dose Informant Patient Reported? Taking?   Loperamide HCl (IMODIUM PO)  Self Yes No   Sig: Take by mouth   oxyCODONE-acetaminophen (PERCOCET) 5-325 mg per tablet   No No   Sig: Take 1 tablet by mouth every 4 (four) hours as needed for moderate pain for up to 10 doses Max Daily Amount: 6 tablets      Facility-Administered Medications: None       Past Medical History:   Diagnosis Date    Cholelithiasis     GERD (gastroesophageal reflux disease)     Herniation of intervertebral disc of lumbar spine     IBS (irritable bowel syndrome)        Past Surgical History:   Procedure Laterality Date    CHOLECYSTECTOMY      COLONOSCOPY      MN ESOPHAGOGASTRODUODENOSCOPY TRANSORAL DIAGNOSTIC N/A 05/02/2019    Procedure: ESOPHAGOGASTRODUODENOSCOPY (EGD);  Surgeon: Heber Mckee MD;  Location: [facility];  Service: Gastroenterology    MN LAPAROSCOPY SURG CHOLECYSTECTOMY N/A 01/09/2017    Procedure: CHOLECYSTECTOMY LAPAROSCOPIC;  Surgeon: Ming Washington MD;   Location: [facility];  Service: General    [place] LAPAROSCOPY SURG RPR INITIAL INGUINAL HERNIA Bilateral 3/1/2024    Procedure: REPAIR HERNIA INGUINAL LAPAROSCOPIC W/ ROBOTICS;  Surgeon: Benito Varner MD;  Location: [facility];  Service: General    UPPER GASTROINTESTINAL ENDOSCOPY      WISDOM TOOTH EXTRACTION         Family History   Problem Relation Age of Onset    Nephrolithiasis Mother     Arthritis Mother     Hyperlipidemia Mother     Heart attack Father     Hypertension Father     Stroke Father     Sudden death Father     Hyperlipidemia Father     Heart disease Father     Coronary artery disease Maternal Grandfather     Alcohol abuse Neg Hx     Substance Abuse Neg Hx     Mental illness Neg Hx     Colon cancer Neg Hx     Colon polyps Neg Hx      I have reviewed and agree with the history as documented.    E-Cigarette/Vaping    E-Cigarette Use Never User      E-Cigarette/Vaping Substances    Nicotine No     THC No     CBD No     Flavoring No     Other No     Unknown No      Social History     Tobacco Use    Smoking status: Never    Smokeless tobacco: Never   Vaping Use    Vaping status: Never Used   Substance Use Topics    Alcohol use: Yes     Comment: occassionally on holidays    Drug use: No       Review of Systems   Constitutional:  Negative for fever.   Respiratory:  Negative for shortness of breath.    Cardiovascular:  Negative for chest pain.   Gastrointestinal:  Positive for abdominal pain (L sided (torso)). Negative for diarrhea, nausea and vomiting.   Genitourinary: Negative.    All other systems reviewed and are negative.      Physical Exam  Physical Exam  Vitals and nursing note reviewed.   Constitutional:       General: He is not in acute distress.     Appearance: Normal appearance. He is normal weight. He is not ill-appearing or toxic-appearing.   HENT:      Head: Normocephalic and atraumatic.      Nose: Nose normal.      Mouth/Throat:      Mouth: Mucous membranes are moist.      Pharynx:  Oropharynx is clear.   Eyes:      Extraocular Movements: Extraocular movements intact.      Conjunctiva/sclera: Conjunctivae normal.   Cardiovascular:      Rate and Rhythm: Normal rate and regular rhythm.      Pulses:           Radial pulses are 2+ on the right side and 2+ on the left side.      Heart sounds: Normal heart sounds, S1 normal and S2 normal.   Pulmonary:      Effort: Pulmonary effort is normal. No respiratory distress.      Breath sounds: Normal breath sounds and air entry.   Abdominal:      General: There is no distension.      Palpations: Abdomen is soft.      Tenderness: There is no abdominal tenderness. There is no right CVA tenderness, left CVA tenderness, guarding or rebound.   Musculoskeletal:         General: Normal range of motion.      Cervical back: Normal range of motion and neck supple.   Skin:     General: Skin is warm and dry.      Capillary Refill: Capillary refill takes less than 2 seconds.   Neurological:      General: No focal deficit present.      Mental Status: He is alert and oriented to person, place, and time. Mental status is at baseline.         Vital Signs  ED Triage Vitals   Temperature Pulse Respirations Blood Pressure SpO2   08/01/24 0945 08/01/24 0945 08/01/24 0945 08/01/24 0945 08/01/24 0945   97.8 °F (36.6 °C) 76 16 143/84 97 %      Temp Source Heart Rate Source Patient Position - Orthostatic VS BP Location FiO2 (%)   08/01/24 0945 08/01/24 1100 08/01/24 0945 08/01/24 0945 --   Oral Monitor Sitting Right arm       Pain Score       08/01/24 0945       1           Vitals:    08/01/24 0945 08/01/24 1100 08/01/24 1200   BP: 143/84 128/72 121/63   Pulse: 76 80 82   Patient Position - Orthostatic VS: Sitting Sitting Sitting         Visual Acuity      ED Medications  Medications   ketorolac (FOR EMS ONLY) (TORADOL) injection 30 mg (0 mg Does not apply Given to EMS 8/1/24 0946)   sodium chloride 0.9 % bolus 1,000 mL (0 mL Intravenous Stopped 8/1/24 1150)       Diagnostic  Studies  Results Reviewed       Procedure Component Value Units Date/Time    Urine Microscopic [[id number]]  (Abnormal) Collected: 08/01/24 1331    Lab Status: Final result Specimen: Urine, Clean Catch Updated: 08/01/24 1424     RBC, UA 2-4 /hpf      WBC, UA 0-1 /hpf      Epithelial Cells None Seen /hpf      Bacteria, UA None Seen /hpf      MUCUS THREADS Occasional    UA w Reflex to Microscopic w Reflex to Culture [[id number]]  (Abnormal) Collected: 08/01/24 1331    Lab Status: Final result Specimen: Urine, Clean Catch Updated: 08/01/24 1421     Color, UA Yellow     Clarity, UA Clear     Specific Gravity, UA <1.005     pH, UA 6.0     Leukocytes, UA Negative     Nitrite, UA Negative     Protein, UA Negative mg/dl      Glucose, UA Negative mg/dl      Ketones, UA Negative mg/dl      Urobilinogen, UA <2.0 mg/dl      Bilirubin, UA Negative     Occult Blood, UA Large    Urine Microscopic [[id number]]  (Abnormal) Collected: 08/01/24 1153    Lab Status: Final result Specimen: Urine, Clean Catch Updated: 08/01/24 1320     RBC, UA 10-20 /hpf      WBC, UA 2-4 /hpf      Epithelial Cells Occasional /hpf      Bacteria, UA Moderate /hpf      MUCUS THREADS Moderate     Hyaline Casts, UA 2-4 /lpf     UA w Reflex to Microscopic w Reflex to Culture [[id number]]  (Abnormal) Collected: 08/01/24 1153    Lab Status: Final result Specimen: Urine, Clean Catch Updated: 08/01/24 1221     Color, UA Yellow     Clarity, UA Clear     Specific Gravity, UA 1.015     pH, UA 5.5     Leukocytes, UA Negative     Nitrite, UA Negative     Protein, UA Trace mg/dl      Glucose, UA Negative mg/dl      Ketones, UA Negative mg/dl      Urobilinogen, UA <2.0 mg/dl      Bilirubin, UA Negative     Occult Blood, UA Large    Comprehensive metabolic panel [[id number]]  (Abnormal) Collected: 08/01/24 1017    Lab Status: Final result Specimen: Blood from Arm, Left Updated: 08/01/24 1037     Sodium 138 mmol/L      Potassium 3.4 mmol/L      Chloride 104 mmol/L      CO2 26  mmol/L      ANION GAP 8 mmol/L      BUN 16 mg/dL      Creatinine 1.29 mg/dL      Glucose 169 mg/dL      Calcium 9.2 mg/dL      AST 38 U/L      ALT 54 U/L      Alkaline Phosphatase 78 U/L      Total Protein 7.0 g/dL      Albumin 4.3 g/dL      Total Bilirubin 1.08 mg/dL      eGFR 66 ml/min/1.73sq m     Narrative:      National Kid[employer] Foundation guidelines for Chronic Kidney Disease (CKD):     Stage 1 with normal or high GFR (GFR > 90 mL/min/1.73 square meters)    Stage 2 Mild CKD (GFR = 60-89 mL/min/1.73 square meters)    Stage 3A Moderate CKD (GFR = 45-59 mL/min/1.73 square meters)    Stage 3B Moderate CKD (GFR = 30-44 mL/min/1.73 square meters)    Stage 4 Severe CKD (GFR = 15-29 mL/min/1.73 square meters)    Stage 5 End Stage CKD (GFR <15 mL/min/1.73 square meters)  Note: GFR calculation is accurate only with a steady state creatinine    Lipase [[id number]]  (Normal) Collected: 08/01/24 1017    Lab Status: Final result Specimen: Blood from Arm, Left Updated: 08/01/24 1037     Lipase 27 u/L     CBC and differential [[id number]]  (Abnormal) Collected: 08/01/24 1017    Lab Status: Final result Specimen: Blood from Arm, Left Updated: 08/01/24 1023     WBC 5.13 Thousand/uL      RBC 4.62 Million/uL      Hemoglobin 13.3 g/dL      Hematocrit 39.2 %      MCV 85 fL      MCH 28.8 pg      MCHC 33.9 g/dL      RDW 12.8 %      MPV 10.6 fL      Platelets 329 Thousands/uL      nRBC 0 /100 WBCs      Segmented % 70 %      Immature Grans % 1 %      Lymphocytes % 25 %      Monocytes % 2 %      Eosinophils Relative 2 %      Basophils Relative 0 %      Absolute Neutrophils 3.59 Thousands/µL      Absolute Immature Grans 0.03 Thousand/uL      Absolute Lymphocytes 1.30 Thousands/µL      Absolute Monocytes 0.12 Thousand/µL      Eosinophils Absolute 0.08 Thousand/µL      Basophils Absolute 0.01 Thousands/µL                    CT abdomen pelvis wo contrast   Final Result by Fidencio Johnson MD (08/01 1123)      Obstructive 4 mm  proximal left ureteral calculus with mild left hydronephrosis      The study was marked in EPIC for immediate notification.      Workstation performed: [id number]                    Procedures  Procedures         ED Course  ED Course as of 08/01/24 1532   Thu Aug 01, 2024   1136 CT abdomen pelvis wo contrast  IMPRESSION:     Obstructive 4 mm proximal left ureteral calculus with mild left hydronephrosis     1151 Patient updated on results including 4 mm left ureteral stone.  He is aware that he is a urine sample.  Pain has been well-controlled since Toradol by EMS.   1221 UA w Reflex to Microscopic w Reflex to Culture(!)  Will await urine micro    1424 Bacteria, UA: None Seen  No evidence of infected stone, will discharged home with supportive care                                 SBIRT 22yo+      Flowsheet Row Most Recent Value   Initial Alcohol Screen: US AUDIT-C     1. How often do you have a drink containing alcohol? 0 Filed at: 08/01/2024 0946   2. How many drinks containing alcohol do you have on a typical day you are drinking?  0 Filed at: 08/01/2024 0946   3a. Male UNDER 65: How often do you have five or more drinks on one occasion? 0 Filed at: 08/01/2024 0946   3b. FEMALE Any Age, or MALE 65+: How often do you have 4 or more drinks on one occassion? 0 Filed at: 08/01/2024 0946   Audit-C Score 0 Filed at: 08/01/2024 0946   JANA: How many times in the past year have you...    Used an illegal drug or used a prescription medication for non-medical reasons? Never Filed at: 08/01/2024 0946                      Medical Decision Making  DDx including but not limited to: Ureteral stone, renal colic, UTI, pyelonephritis, GI etiology less likely    Labs, UA, CT imaging without contrast obtained to further evaluate for electrolyte derangement, organ dysfunction, anemia, screen for infection, and to evaluate for ureteral stone.  Workup with finding of stable kidney function, no urine infection by UA, and CT imaging with  finding of left-sided 4 mm ureteral stone.  Likely the cause of the patient's onset of pain this morning.  Since arrival he has been resting comfortably without recurrence of pain.  Given his stability, we will discharge him to home with plan for NSAIDs, Tylenol as needed, oxycodone as needed, Flomax, straining of all urine, and follow-up with urology.    Problems Addressed:  Hydronephrosis: acute illness or injury  Left ureteral stone: acute illness or injury  Renal colic on left side: acute illness or injury    Amount and/or Complexity of Data Reviewed  Labs: ordered. Decision-making details documented in ED Course.  Radiology: ordered. Decision-making details documented in ED Course.    Risk  OTC drugs.  Prescription drug management.                 Disposition  Final diagnoses:   Left ureteral stone   Renal colic on left side   Hydronephrosis     Time reflects when diagnosis was documented in both MDM as applicable and the Disposition within this note       Time User Action Codes Description Comment    8/1/2024  2:24 PM Shugars, Ivy Add [N20.1] Left ureteral stone     8/1/2024  2:24 PM Shugars, Ivy Add [N23] Renal colic on left side     8/1/2024  2:25 PM Shugars, Ivy Add [N13.30] Hydronephrosis           ED Disposition       ED Disposition   Discharge    Condition   Stable    Date/Time   Thu Aug 1, 2024 1424    Comment   Bennie Finn discharge to home/self care.                   Follow-up Information       Follow up With Specialties Details Why Contact Info Additional Information    [facility] Schedule an appointment as soon as possible for a visit in 1 week  [address]  [facility] [id number] [phone] [facility] [place], [address], [address], [place], [place], [address] [phone]     [facility] Emergency Department Emergency Medicine Go to  If symptoms worsen [address]  [facility] [address] [phone] [facility] Emergency Department, [address], [place], [place] [address]            Discharge Medication List as of 8/1/2024  2:28 PM        START taking these medications    Details   acetaminophen (TYLENOL) 500 mg tablet Take 2 tablets (1,000 mg total) by mouth 3 (three) times a day as needed for mild pain or moderate pain for up to 5 days, Starting u 8/1/2024, Until Tue 8/6/2024 at 2359, Normal      ibuprofen (MOTRIN) 600 mg tablet Multiple Dosages:Starting Thu 8/1/2024, Until Sat 8/3/2024 at 2359, THEN Starting Sun 8/4/2024, Until Tue 8/6/2024 at 2359Take 1 tablet (600 mg total) by mouth 3 (three) times a day with meals for 3 days, THEN 1 tablet (600 mg total) 3 (three) times a  day as needed for mild pain for up to 3 days., Normal      oxyCODONE (Roxicodone) 5 immediate release tablet Take 1 tablet (5 mg total) by mouth every 6 (six) hours as needed for severe pain Max Daily Amount: 20 mg, Starting Thu 8/1/2024, Normal      tamsulosin (FLOMAX) 0.4 mg Take 1 capsule (0.4 mg total) by mouth daily with dinner for 14 days, Starting Thu 8/1/2024, Until Thu 8/15/2024, Normal           CONTINUE these medications which have NOT CHANGED    Details   Loperamide HCl (IMODIUM PO) Take by mouth, Historical Med      oxyCODONE-acetaminophen (PERCOCET) 5-325 mg per tablet Take 1 tablet by mouth every 4 (four) hours as needed for moderate pain for up to 10 doses Max Daily Amount: 6 tablets, Starting Mon 2/26/2024, Normal                 PDMP Review       None            ED Provider  Electronically Signed by             JENNIFER Fiore  08/01/24 3264

## 2024-08-01 NOTE — DISCHARGE INSTRUCTIONS
Use the prescribed occasions as directed to support you while you hopefully pass the stone at home.  Follow-up with urology.  Return to the ER if develop fever, new or worsening pain that does not respond to the current regimen, vomiting, weakness, confusion, or lethargy.

## 2024-08-01 NOTE — Clinical Note
Bennie Finn was seen and treated in our emergency department on 8/1/2024.    No restrictions            Diagnosis: N/A (HIPPA)    Bennie  is off the rest of the shift today, may return to work on return date.    He may return on this date: 08/02/2024         If you have any questions or concerns, please don't hesitate to call.      JENNIFER Fiore    ______________________________           _______________          _______________  Hospital Representative                              Date                                Time

## 2024-08-05 ENCOUNTER — TELEPHONE (OUTPATIENT)
Age: 44
End: 2024-08-05

## 2024-08-05 NOTE — TELEPHONE ENCOUNTER
Pt calling to schedule referral for kidney stones. Pt was seen in ED on 8/1 for left flank pain and had CT scan completed which showed:    IMPRESSION:     Obstructive 4 mm proximal left ureteral calculus with mild left hydronephrosis    Pt is scheduled for next available in [place] on 8/8 with Dr Buenrostro.

## 2024-08-08 ENCOUNTER — OFFICE VISIT (OUTPATIENT)
Dept: UROLOGY | Facility: MEDICAL CENTER | Age: 44
End: 2024-08-08
Payer: COMMERCIAL

## 2024-08-08 VITALS
HEIGHT: 71 IN | OXYGEN SATURATION: 97 % | BODY MASS INDEX: 32.09 KG/M2 | SYSTOLIC BLOOD PRESSURE: 114 MMHG | WEIGHT: 229.2 LBS | HEART RATE: 91 BPM | DIASTOLIC BLOOD PRESSURE: 78 MMHG

## 2024-08-08 DIAGNOSIS — N20.1 LEFT URETERAL STONE: Primary | ICD-10-CM

## 2024-08-08 LAB
SL AMB  POCT GLUCOSE, UA: 100
SL AMB LEUKOCYTE ESTERASE,UA: ABNORMAL
SL AMB POCT BILIRUBIN,UA: ABNORMAL
SL AMB POCT BLOOD,UA: ABNORMAL
SL AMB POCT CLARITY,UA: CLEAR
SL AMB POCT COLOR,UA: ABNORMAL
SL AMB POCT KETONES,UA: ABNORMAL
SL AMB POCT NITRITE,UA: ABNORMAL
SL AMB POCT PH,UA: 5.5
SL AMB POCT SPECIFIC GRAVITY,UA: >=1.03
SL AMB POCT URINE PROTEIN: 30
SL AMB POCT UROBILINOGEN: 0.2

## 2024-08-08 PROCEDURE — 99203 OFFICE O/P NEW LOW 30 MIN: CPT | Performed by: UROLOGY

## 2024-08-08 PROCEDURE — 81003 URINALYSIS AUTO W/O SCOPE: CPT | Performed by: UROLOGY

## 2024-08-08 RX ORDER — TAMSULOSIN HYDROCHLORIDE 0.4 MG/1
0.4 CAPSULE ORAL
Qty: 14 CAPSULE | Refills: 0 | Status: SHIPPED | OUTPATIENT
Start: 2024-08-08 | End: 2024-08-22

## 2024-08-08 NOTE — ASSESSMENT & PLAN NOTE
Left 4 mm proximal ureteral stone with hydro on CT scan (8/1/2024)  No additional renal stones  Second kidney stone episode (passed prior stone)  - UA  - continue tamsulosin 0.4 mg qhs, refills stent  - strain urine for stones  - discussed medical expulsive therapy recommended for ureteral stones < 10 mm in select patients  - discussed indications for ED or urgent intervention include: fevers/chills, inability to tolerate PO due to excessive nausea/vomiting, and uncontrolled pain with PO medication    - discussed that ureteroscopy and laser lithotripsy is recommended for mid-distal ureteral stones, and that both ureteroscopy and ESWL are options for proximal ureteral stones  - discussed that ESWL is less effective when HU > 1000 and is less effective overall as compared to ureteroscopy, however, does not require a postoperative stent when stone is < 2 cm in size   - discussed that renal stones are non-obstructing and do not require any urgent intervention   - discussed option of observation of small renal stones with potential for subsequent development of stone-related events and increase in size of stone burden  - discussed ureteroscopy, ESWL and percutaneous nephrolithotomy as surgical options for the treatment of renal stones depending on size, location, and density of stones    - patient elects to proceed with MET   - will get renal sonogram in 2 weeks to evaluate for resolution of hydro  - will also evaluate for resolution of microscopic hematuria

## 2024-08-08 NOTE — PROGRESS NOTES
8/8/2024    Bennie DYLON Aakash  1980  [id number]    1. Left ureteral stone  Assessment & Plan:  Left 4 mm proximal ureteral stone with hydro on CT scan (8/1/2024)  No additional renal stones  Second kidney stone episode (passed prior stone)  - UA  - continue tamsulosin 0.4 mg qhs, refills stent  - strain urine for stones  - discussed medical expulsive therapy recommended for ureteral stones < 10 mm in select patients  - discussed indications for ED or urgent intervention include: fevers/chills, inability to tolerate PO due to excessive nausea/vomiting, and uncontrolled pain with PO medication    - discussed that ureteroscopy and laser lithotripsy is recommended for mid-distal ureteral stones, and that both ureteroscopy and ESWL are options for proximal ureteral stones  - discussed that ESWL is less effective when HU > 1000 and is less effective overall as compared to ureteroscopy, however, does not require a postoperative stent when stone is < 2 cm in size   - discussed that renal stones are non-obstructing and do not require any urgent intervention   - discussed option of observation of small renal stones with potential for subsequent development of stone-related events and increase in size of stone burden  - discussed ureteroscopy, ESWL and percutaneous nephrolithotomy as surgical options for the treatment of renal stones depending on size, location, and density of stones    - patient elects to proceed with MET   - will get renal sonogram in 2 weeks to evaluate for resolution of hydro  - will also evaluate for resolution of microscopic hematuria   Orders:  -     Ambulatory Referral to Urology  -     POCT urine dip auto non-scope  -     Urinalysis with microscopic  -     US kidney and bladder with pvr; Future; Expected date: 08/22/2024  -     tamsulosin (FLOMAX) 0.4 mg; Take 1 capsule (0.4 mg total) by mouth daily with dinner for 14 days       History of Present Illness  43 y.o. male with a history of  cholelithiasis s/p cholecystectomy, GERD and lumbar disc herniation who was evaluated in ED on 8/1/2024 for left-sided abdominal pain and found to have a 4 mm left proximal ureteral stone with associated mild left hydronephrosis.      Symptoms started on 8/1/2024  Reports left flank pain resolved since leaving ED    Currently Reports:  No flank pain  No nausea/vomiting  Still reports intermittent gross hematuria  No fevers/chills    Has been straining urine since leaving ED and has not seen stone pass in urine  Has been taking tamsulosin since leaving [facility]    Patient has a prior history of kidney stone (s)  Passed a kidney stone a few years ago  No prior surgical intervention  + family history of kidney stones: mother and father  No taking any kidney stone prevention medications  No history of primary hyperparathyroidism  No DM  No gout  No calcium or Vitamin D supplements  + gastrointestinal disease with malabsorption:  IBS-D            AUA Symptom Score  AUA SYMPTOM SCORE      Flowsheet Row Most Recent Value   AUA SYMPTOM SCORE    How often have you had a sensation of not emptying your bladder completely after you finished urinating? 0 (P)     How often have you had to urinate again less than two hours after you finished urinating? 1 (P)     How often have you found you stopped and started again several times when you urinate? 0 (P)     How often have you found it difficult to postpone urination? 0 (P)     How often have you had a weak urinary stream? 0 (P)     How often have you had to push or strain to begin urination? 0 (P)     How many times did you most typically get up to urinate from the time you went to bed at night until the time you got up in the morning? 1 (P)     Quality of Life: If you were to spend the rest of your life with your urinary condition just the way it is now, how would you feel about that? 0 (P)     AUA SYMPTOM SCORE 2 (P)              Review of Systems   All other systems reviewed  and are negative.      Past Medical History  Past Medical History:   Diagnosis Date    Cholelithiasis     GERD (gastroesophageal reflux disease)     Herniation of intervertebral disc of lumbar spine     IBS (irritable bowel syndrome)        Past Social History  Past Surgical History:   Procedure Laterality Date    CHOLECYSTECTOMY      COLONOSCOPY      NV ESOPHAGOGASTRODUODENOSCOPY TRANSORAL DIAGNOSTIC N/A 05/02/2019    Procedure: ESOPHAGOGASTRODUODENOSCOPY (EGD);  Surgeon: Heber Mckee MD;  Location: [facility];  Service: Gastroenterology    NV LAPAROSCOPY SURG CHOLECYSTECTOMY N/A 01/09/2017    Procedure: CHOLECYSTECTOMY LAPAROSCOPIC;  Surgeon: Ming Washington MD;  Location: [facility];  Service: General    NV LAPAROSCOPY SURG RPR INITIAL INGUINAL HERNIA Bilateral 3/1/2024    Procedure: REPAIR HERNIA INGUINAL LAPAROSCOPIC W/ ROBOTICS;  Surgeon: Benito Varner MD;  Location: [facility];  Service: General    UPPER GASTROINTESTINAL ENDOSCOPY      WISDOM TOOTH EXTRACTION         Past Family History  Family History   Problem Relation Age of Onset    Nephrolithiasis Mother     Arthritis Mother     Hyperlipidemia Mother     Heart attack Father     Hypertension Father     Stroke Father     Sudden death Father     Hyperlipidemia Father     Heart disease Father     Coronary artery disease Maternal Grandfather     Alcohol abuse Neg Hx     Substance Abuse Neg Hx     Mental illness Neg Hx     Colon cancer Neg Hx     Colon polyps Neg Hx        Past Social history  Social History     Socioeconomic History    Marital status: Single     Spouse name: Not on file    Number of children: Not on file    Years of education: Not on file    Highest education level: Not on file   Occupational History    Not on file   Tobacco Use    Smoking status: Never    Smokeless tobacco: Never   Vaping Use    Vaping status: Never Used   Substance and Sexual Activity    Alcohol use: Yes     Comment: occassionally on holidays    Drug use: No     "Sexual activity: Not Currently     Partners: Female     Birth control/protection: Condom Male   Other Topics Concern    Not on file   Social History Narrative    Not on file     Social Determinants of Health     Financial Resource Strain: Not on file   Food Insecurity: Not on file   Transportation Needs: Not on file   Physical Activity: Not on file   Stress: Not on file   Social Connections: Not on file   Intimate Partner Violence: Not on file   Housing Stability: Not on file       Current Medications  Current Outpatient Medications   Medication Sig Dispense Refill    Loperamide HCl (IMODIUM PO) Take by mouth      oxyCODONE (Roxicodone) 5 immediate release tablet Take 1 tablet (5 mg total) by mouth every 6 (six) hours as needed for severe pain Max Daily Amount: 20 mg 10 tablet 0    oxyCODONE-acetaminophen (PERCOCET) 5-325 mg per tablet Take 1 tablet by mouth every 4 (four) hours as needed for moderate pain for up to 10 doses Max Daily Amount: 6 tablets 10 tablet 0    tamsulosin (FLOMAX) 0.4 mg Take 1 capsule (0.4 mg total) by mouth daily with dinner for 14 days 14 capsule 0    ibuprofen (MOTRIN) 600 mg tablet Take 1 tablet (600 mg total) by mouth 3 (three) times a day with meals for 3 days, THEN 1 tablet (600 mg total) 3 (three) times a day as needed for mild pain for up to 3 days. 18 tablet 0     No current facility-administered medications for this visit.       Allergies  Allergies   Allergen Reactions    Other      Annotation - [id number]: poison ivy/oak       Past Medical History, Social History, Family History, medications and allergies were reviewed.    Vitals  Vitals:    08/08/24 1606   BP: 114/78   BP Location: Left arm   Patient Position: Sitting   Cuff Size: Adult   Pulse: 91   SpO2: 97%   Weight: 104 kg (229 lb 3.2 oz)   Height: 5' 11\" (1.803 m)       Physical Exam  Constitutional:       Appearance: Normal appearance. He is normal weight.   HENT:      Head: Normocephalic.      Nose: Nose normal. No " "congestion.   Eyes:      Conjunctiva/sclera: Conjunctivae normal.   Cardiovascular:      Rate and Rhythm: Normal rate.   Pulmonary:      Effort: Pulmonary effort is normal. No respiratory distress.   Abdominal:      General: Abdomen is flat. There is no distension.      Palpations: Abdomen is soft.      Tenderness: There is no right CVA tenderness or left CVA tenderness.   Musculoskeletal:      Comments: Normal gait   Skin:     General: Skin is warm and dry.   Neurological:      General: No focal deficit present.      Mental Status: He is alert and oriented to person, place, and time.   Psychiatric:         Mood and Affect: Mood normal.         Results  No results found for: \"PSA\"  Lab Results   Component Value Date    CALCIUM 9.2 08/01/2024    K 3.4 (L) 08/01/2024    CO2 26 08/01/2024     08/01/2024    BUN 16 08/01/2024    CREATININE 1.29 08/01/2024     Lab Results   Component Value Date    WBC 5.13 08/01/2024    HGB 13.3 08/01/2024    HCT 39.2 08/01/2024    MCV 85 08/01/2024     08/01/2024       Office Urine Dip  Recent Results (from the past 1 hour(s))   POCT urine dip auto non-scope    Collection Time: 08/08/24  4:14 PM   Result Value Ref Range     COLOR,UA Annabel     CLARITY,UA Clear     SPECIFIC GRAVITY,UA >=1.030      PH,UA 5.5     LEUKOCYTE ESTERASE,UA Neg     NITRITE,UA Neg     GLUCOSE,      KETONES,UA Trace     BILIRUBIN,UA Small     BLOOD,UA Trace-lysed     POCT URINE PROTEIN 30     SL AMB POCT UROBILINOGEN 0.2    ]    "

## 2024-08-09 LAB
APPEARANCE UR: ABNORMAL
BACTERIA URNS QL MICRO: NORMAL
BILIRUB UR QL STRIP: NEGATIVE
CASTS URNS QL MICRO: NORMAL /LPF
COLOR UR: YELLOW
EPI CELLS #/AREA URNS HPF: NORMAL /HPF (ref 0–10)
GLUCOSE UR QL: NEGATIVE
HGB UR QL STRIP: NEGATIVE
KETONES UR QL STRIP: ABNORMAL
LEUKOCYTE ESTERASE UR QL STRIP: NEGATIVE
MICRO URNS: ABNORMAL
NITRITE UR QL STRIP: NEGATIVE
PH UR STRIP: 5 [PH] (ref 5–7.5)
PROT UR QL STRIP: ABNORMAL
RBC #/AREA URNS HPF: NORMAL /HPF (ref 0–2)
SP GR UR: 1.03 (ref 1–1.03)
UROBILINOGEN UR STRIP-ACNC: 0.2 MG/DL (ref 0.2–1)
WBC #/AREA URNS HPF: NORMAL /HPF (ref 0–5)

## 2024-08-12 ENCOUNTER — RA CDI HCC (OUTPATIENT)
Dept: OTHER | Facility: HOSPITAL | Age: 44
End: 2024-08-12

## 2024-08-19 ENCOUNTER — OFFICE VISIT (OUTPATIENT)
Dept: FAMILY MEDICINE CLINIC | Facility: CLINIC | Age: 44
End: 2024-08-19
Payer: COMMERCIAL

## 2024-08-19 VITALS
DIASTOLIC BLOOD PRESSURE: 80 MMHG | TEMPERATURE: 98.2 F | RESPIRATION RATE: 16 BRPM | HEIGHT: 71 IN | HEART RATE: 78 BPM | BODY MASS INDEX: 32.06 KG/M2 | SYSTOLIC BLOOD PRESSURE: 124 MMHG | WEIGHT: 229 LBS | OXYGEN SATURATION: 98 %

## 2024-08-19 DIAGNOSIS — Z12.11 SCREENING FOR COLORECTAL CANCER: ICD-10-CM

## 2024-08-19 DIAGNOSIS — Z12.12 SCREENING FOR COLORECTAL CANCER: ICD-10-CM

## 2024-08-19 DIAGNOSIS — Z00.00 ANNUAL PHYSICAL EXAM: Primary | ICD-10-CM

## 2024-08-19 PROBLEM — N50.812 LEFT TESTICULAR PAIN: Status: RESOLVED | Noted: 2023-11-29 | Resolved: 2024-08-19

## 2024-08-19 PROBLEM — R10.32 LEFT GROIN PAIN: Status: RESOLVED | Noted: 2022-06-27 | Resolved: 2024-08-19

## 2024-08-19 PROCEDURE — 99396 PREV VISIT EST AGE 40-64: CPT | Performed by: PHYSICIAN ASSISTANT

## 2024-08-19 NOTE — PROGRESS NOTES
Adult Annual Physical  Name: Bennie Finn      : 1980      MRN: [id number]  Encounter Provider: Camila Gill PA-C  Encounter Date: 2024   Encounter department: [facility]    Assessment & Plan   1. Annual physical exam    Immunizations and preventive care screenings were discussed with patient today. Appropriate education was printed on patient's after visit summary.    Discussed risks and benefits of prostate cancer screening. We discussed the controversial history of PSA screening for prostate cancer in the [place] as well as the risk of over detection and over treatment of prostate cancer by way of PSA screening.  The patient understands that PSA blood testing is an imperfect way to screen for prostate cancer and that elevated PSA levels in the blood may also be caused by infection, inflammation, prostatic trauma or manipulation, urological procedures, or by benign prostatic enlargement.    The role of the digital rectal examination in prostate cancer screening was also discussed and I discussed with him that there is large interobserver variability in the findings of digital rectal examination.    Counseling:  Alcohol/drug use: discussed moderation in alcohol intake, the recommendations for healthy alcohol use, and avoidance of illicit drug use.  Dental Health: discussed importance of regular tooth brushing, flossing, and dental visits.  Injury prevention: discussed safety/seat belts, safety helmets, smoke detectors, carbon dioxide detectors, and smoking near bedding or upholstery.  Exercise: the importance of regular exercise/physical activity was discussed. Recommend exercise 3-5 times per week for at least 30 minutes.   Labs ordered  Colonoscopy ordered     Follow up 1 year     History of Present Illness     Adult Annual Physical:  Patient presents for annual physical.     Diet and Physical Activity:  - Diet/Nutrition: poor diet.  -  "Exercise: 1-2 times a week on average and walking. volley ball 2 x a week    Depression Screening:  - PHQ-2 Score: 0    General Health:  - Sleep: sleeps well.  - Hearing: normal hearing bilateral ears.  - Vision: goes for regular eye exams.  - Dental: regular dental visits and brushes teeth twice daily.    Review of Systems   Constitutional: Negative.    HENT: Negative.     Eyes: Negative.    Respiratory: Negative.     Cardiovascular: Negative.    Gastrointestinal: Negative.    Endocrine: Negative.    Genitourinary: Negative.    Musculoskeletal: Negative.    Skin: Negative.    Allergic/Immunologic: Negative.    Neurological: Negative.    Hematological: Negative.    Psychiatric/Behavioral: Negative.       Medical History Reviewed by provider this encounter:         Objective     /80   Pulse 78   Temp 98.2 °F (36.8 °C) (Temporal)   Resp 16   Ht 5' 11\" (1.803 m)   Wt 104 kg (229 lb)   SpO2 98%   BMI 31.94 kg/m²     Physical Exam  Constitutional:       Appearance: Normal appearance. He is well-developed and normal weight.   HENT:      Head: Normocephalic and atraumatic.      Right Ear: External ear normal.      Left Ear: External ear normal.   Eyes:      Extraocular Movements: Extraocular movements intact.      Conjunctiva/sclera: Conjunctivae normal.      Pupils: Pupils are equal, round, and reactive to light.   Neck:      Thyroid: No thyromegaly.   Cardiovascular:      Rate and Rhythm: Normal rate and regular rhythm.      Pulses: Normal pulses.      Heart sounds: Normal heart sounds. No murmur heard.  Pulmonary:      Effort: Pulmonary effort is normal.      Breath sounds: Normal breath sounds. No wheezing or rales.   Abdominal:      General: Abdomen is flat. Bowel sounds are normal.      Palpations: Abdomen is soft. There is no mass.      Tenderness: There is no abdominal tenderness. There is no rebound.   Musculoskeletal:         General: Normal range of motion.      Cervical back: Normal range of motion " and neck supple.   Lymphadenopathy:      Cervical: No cervical adenopathy.   Skin:     General: Skin is warm.   Neurological:      General: No focal deficit present.      Mental Status: He is alert and oriented to person, place, and time.      Cranial Nerves: No cranial nerve deficit.      Deep Tendon Reflexes: Reflexes normal.   Psychiatric:         Mood and Affect: Mood normal.         Behavior: Behavior normal.         Thought Content: Thought content normal.         Judgment: Judgment normal.

## 2024-08-24 ENCOUNTER — HOSPITAL ENCOUNTER (OUTPATIENT)
Dept: ULTRASOUND IMAGING | Facility: HOSPITAL | Age: 44
Discharge: HOME | End: 2024-08-24
Attending: UROLOGY
Payer: COMMERCIAL

## 2024-08-24 DIAGNOSIS — N20.1 LEFT URETERAL STONE: ICD-10-CM

## 2024-08-24 PROCEDURE — 76770 US EXAM ABDO BACK WALL COMP: CPT

## 2024-08-27 ENCOUNTER — HOSPITAL ENCOUNTER (EMERGENCY)
Facility: HOSPITAL | Age: 44
End: 2024-08-28
Attending: EMERGENCY MEDICINE
Payer: COMMERCIAL

## 2024-08-27 ENCOUNTER — APPOINTMENT (EMERGENCY)
Dept: CT IMAGING | Facility: HOSPITAL | Age: 44
End: 2024-08-27
Payer: COMMERCIAL

## 2024-08-27 DIAGNOSIS — N13.30 HYDROURETERONEPHROSIS: ICD-10-CM

## 2024-08-27 DIAGNOSIS — N20.1 LEFT URETERAL STONE: ICD-10-CM

## 2024-08-27 DIAGNOSIS — K42.9 UMBILICAL HERNIA: ICD-10-CM

## 2024-08-27 DIAGNOSIS — N20.1 URETERAL CALCULUS: Primary | ICD-10-CM

## 2024-08-27 DIAGNOSIS — D72.829 LEUKOCYTOSIS: ICD-10-CM

## 2024-08-27 LAB
ALBUMIN SERPL BCG-MCNC: 4.5 G/DL (ref 3.5–5)
ALP SERPL-CCNC: 71 U/L (ref 34–104)
ALT SERPL W P-5'-P-CCNC: 27 U/L (ref 7–52)
ANION GAP SERPL CALCULATED.3IONS-SCNC: 9 MMOL/L (ref 4–13)
AST SERPL W P-5'-P-CCNC: 23 U/L (ref 13–39)
BACTERIA UR QL AUTO: ABNORMAL /HPF
BASOPHILS # BLD AUTO: 0.02 THOUSANDS/ΜL (ref 0–0.1)
BASOPHILS NFR BLD AUTO: 0 % (ref 0–1)
BILIRUB SERPL-MCNC: 1.03 MG/DL (ref 0.2–1)
BILIRUB UR QL STRIP: NEGATIVE
BUN SERPL-MCNC: 14 MG/DL (ref 5–25)
CALCIUM SERPL-MCNC: 9.5 MG/DL (ref 8.4–10.2)
CHLORIDE SERPL-SCNC: 103 MMOL/L (ref 96–108)
CLARITY UR: CLEAR
CO2 SERPL-SCNC: 26 MMOL/L (ref 21–32)
COLOR UR: YELLOW
CREAT SERPL-MCNC: 1.58 MG/DL (ref 0.6–1.3)
EOSINOPHIL # BLD AUTO: 0.03 THOUSAND/ΜL (ref 0–0.61)
EOSINOPHIL NFR BLD AUTO: 0 % (ref 0–6)
ERYTHROCYTE [DISTWIDTH] IN BLOOD BY AUTOMATED COUNT: 13 % (ref 11.6–15.1)
GFR SERPL CREATININE-BSD FRML MDRD: 52 ML/MIN/1.73SQ M
GLUCOSE SERPL-MCNC: 100 MG/DL (ref 65–140)
GLUCOSE UR STRIP-MCNC: NEGATIVE MG/DL
HCT VFR BLD AUTO: 40.1 % (ref 36.5–49.3)
HGB BLD-MCNC: 14.2 G/DL (ref 12–17)
HGB UR QL STRIP.AUTO: ABNORMAL
IMM GRANULOCYTES # BLD AUTO: 0.07 THOUSAND/UL (ref 0–0.2)
IMM GRANULOCYTES NFR BLD AUTO: 1 % (ref 0–2)
KETONES UR STRIP-MCNC: ABNORMAL MG/DL
LEUKOCYTE ESTERASE UR QL STRIP: NEGATIVE
LIPASE SERPL-CCNC: 24 U/L (ref 11–82)
LYMPHOCYTES # BLD AUTO: 0.78 THOUSANDS/ΜL (ref 0.6–4.47)
LYMPHOCYTES NFR BLD AUTO: 8 % (ref 14–44)
MCH RBC QN AUTO: 30.3 PG (ref 26.8–34.3)
MCHC RBC AUTO-ENTMCNC: 35.4 G/DL (ref 31.4–37.4)
MCV RBC AUTO: 86 FL (ref 82–98)
MONOCYTES # BLD AUTO: 0.66 THOUSAND/ΜL (ref 0.17–1.22)
MONOCYTES NFR BLD AUTO: 6 % (ref 4–12)
MUCOUS THREADS UR QL AUTO: ABNORMAL
NEUTROPHILS # BLD AUTO: 8.69 THOUSANDS/ΜL (ref 1.85–7.62)
NEUTS SEG NFR BLD AUTO: 85 % (ref 43–75)
NITRITE UR QL STRIP: NEGATIVE
NON-SQ EPI CELLS URNS QL MICRO: ABNORMAL /HPF
NRBC BLD AUTO-RTO: 0 /100 WBCS
PH UR STRIP.AUTO: 6.5 [PH]
PLATELET # BLD AUTO: 259 THOUSANDS/UL (ref 149–390)
PMV BLD AUTO: 10.3 FL (ref 8.9–12.7)
POTASSIUM SERPL-SCNC: 3.7 MMOL/L (ref 3.5–5.3)
PROT SERPL-MCNC: 7.2 G/DL (ref 6.4–8.4)
PROT UR STRIP-MCNC: NEGATIVE MG/DL
RBC # BLD AUTO: 4.69 MILLION/UL (ref 3.88–5.62)
RBC #/AREA URNS AUTO: ABNORMAL /HPF
SODIUM SERPL-SCNC: 138 MMOL/L (ref 135–147)
SP GR UR STRIP.AUTO: 1.01 (ref 1–1.03)
UROBILINOGEN UR STRIP-ACNC: <2 MG/DL
WBC # BLD AUTO: 10.25 THOUSAND/UL (ref 4.31–10.16)
WBC #/AREA URNS AUTO: ABNORMAL /HPF

## 2024-08-27 PROCEDURE — 99285 EMERGENCY DEPT VISIT HI MDM: CPT

## 2024-08-27 PROCEDURE — 36415 COLL VENOUS BLD VENIPUNCTURE: CPT

## 2024-08-27 PROCEDURE — 74176 CT ABD & PELVIS W/O CONTRAST: CPT

## 2024-08-27 PROCEDURE — 81001 URINALYSIS AUTO W/SCOPE: CPT | Performed by: EMERGENCY MEDICINE

## 2024-08-27 PROCEDURE — 99284 EMERGENCY DEPT VISIT MOD MDM: CPT

## 2024-08-27 PROCEDURE — 83690 ASSAY OF LIPASE: CPT | Performed by: EMERGENCY MEDICINE

## 2024-08-27 PROCEDURE — 96374 THER/PROPH/DIAG INJ IV PUSH: CPT

## 2024-08-27 PROCEDURE — 85025 COMPLETE CBC W/AUTO DIFF WBC: CPT | Performed by: EMERGENCY MEDICINE

## 2024-08-27 PROCEDURE — 80053 COMPREHEN METABOLIC PANEL: CPT | Performed by: EMERGENCY MEDICINE

## 2024-08-27 PROCEDURE — 96361 HYDRATE IV INFUSION ADD-ON: CPT

## 2024-08-27 RX ORDER — MORPHINE SULFATE 4 MG/ML
4 INJECTION, SOLUTION INTRAMUSCULAR; INTRAVENOUS ONCE
Status: COMPLETED | OUTPATIENT
Start: 2024-08-27 | End: 2024-08-27

## 2024-08-27 RX ORDER — ACETAMINOPHEN 10 MG/ML
1000 INJECTION, SOLUTION INTRAVENOUS ONCE
Status: COMPLETED | OUTPATIENT
Start: 2024-08-27 | End: 2024-08-27

## 2024-08-27 RX ADMIN — ACETAMINOPHEN 1000 MG: 10 INJECTION INTRAVENOUS at 20:11

## 2024-08-27 RX ADMIN — SODIUM CHLORIDE 1000 ML: 0.9 INJECTION, SOLUTION INTRAVENOUS at 20:11

## 2024-08-27 RX ADMIN — MORPHINE SULFATE 4 MG: 4 INJECTION INTRAVENOUS at 23:00

## 2024-08-28 ENCOUNTER — TELEPHONE (OUTPATIENT)
Dept: UROLOGY | Facility: CLINIC | Age: 44
End: 2024-08-28

## 2024-08-28 ENCOUNTER — ANESTHESIA (OUTPATIENT)
Dept: PERIOP | Facility: HOSPITAL | Age: 44
End: 2024-08-28
Payer: COMMERCIAL

## 2024-08-28 ENCOUNTER — HOSPITAL ENCOUNTER (OUTPATIENT)
Facility: HOSPITAL | Age: 44
Setting detail: OUTPATIENT SURGERY
Discharge: HOME | End: 2024-08-28
Attending: UROLOGY | Admitting: UROLOGY
Payer: COMMERCIAL

## 2024-08-28 ENCOUNTER — APPOINTMENT (EMERGENCY)
Dept: RADIOLOGY | Facility: HOSPITAL | Age: 44
End: 2024-08-28
Payer: COMMERCIAL

## 2024-08-28 ENCOUNTER — ANESTHESIA EVENT (OUTPATIENT)
Dept: PERIOP | Facility: HOSPITAL | Age: 44
End: 2024-08-28
Payer: COMMERCIAL

## 2024-08-28 VITALS
OXYGEN SATURATION: 99 % | SYSTOLIC BLOOD PRESSURE: 143 MMHG | HEART RATE: 62 BPM | TEMPERATURE: 97.1 F | RESPIRATION RATE: 18 BRPM | DIASTOLIC BLOOD PRESSURE: 82 MMHG

## 2024-08-28 VITALS
OXYGEN SATURATION: 98 % | DIASTOLIC BLOOD PRESSURE: 71 MMHG | TEMPERATURE: 98.4 F | SYSTOLIC BLOOD PRESSURE: 134 MMHG | RESPIRATION RATE: 18 BRPM | HEART RATE: 64 BPM

## 2024-08-28 DIAGNOSIS — N20.1 LEFT URETERAL STONE: ICD-10-CM

## 2024-08-28 DIAGNOSIS — N20.1 LEFT URETERAL STONE: Primary | ICD-10-CM

## 2024-08-28 PROCEDURE — 82360 CALCULUS ASSAY QUANT: CPT | Performed by: UROLOGY

## 2024-08-28 PROCEDURE — 96361 HYDRATE IV INFUSION ADD-ON: CPT

## 2024-08-28 PROCEDURE — C2625 STENT, NON-COR, TEM W/DEL SY: HCPCS | Performed by: UROLOGY

## 2024-08-28 PROCEDURE — 52332 CYSTOSCOPY AND TREATMENT: CPT | Performed by: UROLOGY

## 2024-08-28 PROCEDURE — C1758 CATHETER, URETERAL: HCPCS | Performed by: UROLOGY

## 2024-08-28 PROCEDURE — 74420 UROGRAPHY RTRGR +-KUB: CPT

## 2024-08-28 PROCEDURE — 52352 CYSTOURETERO W/STONE REMOVE: CPT | Performed by: UROLOGY

## 2024-08-28 PROCEDURE — NC001 PR NO CHARGE: Performed by: UROLOGY

## 2024-08-28 PROCEDURE — 96375 TX/PRO/DX INJ NEW DRUG ADDON: CPT

## 2024-08-28 PROCEDURE — C1769 GUIDE WIRE: HCPCS | Performed by: UROLOGY

## 2024-08-28 DEVICE — INLAY OPTIMA URETERAL STENT W/O GUIDEWIRE
Type: IMPLANTABLE DEVICE | Site: URETER | Status: FUNCTIONAL
Brand: BARD® INLAY OPTIMA® URETERAL STENT

## 2024-08-28 RX ORDER — FENTANYL CITRATE 50 UG/ML
50 INJECTION, SOLUTION INTRAMUSCULAR; INTRAVENOUS ONCE
Status: COMPLETED | OUTPATIENT
Start: 2024-08-28 | End: 2024-08-28

## 2024-08-28 RX ORDER — CEFAZOLIN SODIUM 2 G/50ML
2000 SOLUTION INTRAVENOUS
Status: COMPLETED | OUTPATIENT
Start: 2024-08-28 | End: 2024-08-28

## 2024-08-28 RX ORDER — FENTANYL CITRATE 50 UG/ML
INJECTION, SOLUTION INTRAMUSCULAR; INTRAVENOUS AS NEEDED
Status: DISCONTINUED | OUTPATIENT
Start: 2024-08-28 | End: 2024-08-28

## 2024-08-28 RX ORDER — LIDOCAINE HYDROCHLORIDE 10 MG/ML
INJECTION, SOLUTION EPIDURAL; INFILTRATION; INTRACAUDAL; PERINEURAL AS NEEDED
Status: DISCONTINUED | OUTPATIENT
Start: 2024-08-28 | End: 2024-08-28

## 2024-08-28 RX ORDER — SODIUM CHLORIDE, SODIUM LACTATE, POTASSIUM CHLORIDE, CALCIUM CHLORIDE 600; 310; 30; 20 MG/100ML; MG/100ML; MG/100ML; MG/100ML
INJECTION, SOLUTION INTRAVENOUS CONTINUOUS PRN
Status: DISCONTINUED | OUTPATIENT
Start: 2024-08-28 | End: 2024-08-28

## 2024-08-28 RX ORDER — FENTANYL CITRATE/PF 50 MCG/ML
25 SYRINGE (ML) INJECTION
Status: DISCONTINUED | OUTPATIENT
Start: 2024-08-28 | End: 2024-08-28 | Stop reason: HOSPADM

## 2024-08-28 RX ORDER — ONDANSETRON 2 MG/ML
INJECTION INTRAMUSCULAR; INTRAVENOUS AS NEEDED
Status: DISCONTINUED | OUTPATIENT
Start: 2024-08-28 | End: 2024-08-28

## 2024-08-28 RX ORDER — METOCLOPRAMIDE HYDROCHLORIDE 5 MG/ML
10 INJECTION INTRAMUSCULAR; INTRAVENOUS ONCE AS NEEDED
Status: DISCONTINUED | OUTPATIENT
Start: 2024-08-28 | End: 2024-08-28 | Stop reason: HOSPADM

## 2024-08-28 RX ORDER — PROPOFOL 10 MG/ML
INJECTION, EMULSION INTRAVENOUS AS NEEDED
Status: DISCONTINUED | OUTPATIENT
Start: 2024-08-28 | End: 2024-08-28

## 2024-08-28 RX ORDER — MAGNESIUM HYDROXIDE 1200 MG/15ML
LIQUID ORAL AS NEEDED
Status: DISCONTINUED | OUTPATIENT
Start: 2024-08-28 | End: 2024-08-28 | Stop reason: HOSPADM

## 2024-08-28 RX ORDER — HYDROMORPHONE HCL/PF 1 MG/ML
0.5 SYRINGE (ML) INJECTION
Status: DISCONTINUED | OUTPATIENT
Start: 2024-08-28 | End: 2024-08-28 | Stop reason: HOSPADM

## 2024-08-28 RX ORDER — DEXAMETHASONE SODIUM PHOSPHATE 10 MG/ML
INJECTION, SOLUTION INTRAMUSCULAR; INTRAVENOUS AS NEEDED
Status: DISCONTINUED | OUTPATIENT
Start: 2024-08-28 | End: 2024-08-28

## 2024-08-28 RX ORDER — ACETAMINOPHEN 325 MG/1
650 TABLET ORAL EVERY 6 HOURS PRN
Status: DISCONTINUED | OUTPATIENT
Start: 2024-08-28 | End: 2024-08-28 | Stop reason: HOSPADM

## 2024-08-28 RX ORDER — SODIUM CHLORIDE, SODIUM LACTATE, POTASSIUM CHLORIDE, CALCIUM CHLORIDE 600; 310; 30; 20 MG/100ML; MG/100ML; MG/100ML; MG/100ML
75 INJECTION, SOLUTION INTRAVENOUS CONTINUOUS
Status: DISCONTINUED | OUTPATIENT
Start: 2024-08-28 | End: 2024-08-28 | Stop reason: HOSPADM

## 2024-08-28 RX ORDER — OXYBUTYNIN CHLORIDE 5 MG/1
5 TABLET, EXTENDED RELEASE ORAL DAILY PRN
Qty: 10 TABLET | Refills: 0 | Status: SHIPPED | OUTPATIENT
Start: 2024-08-28

## 2024-08-28 RX ORDER — OXYBUTYNIN CHLORIDE 5 MG/1
5 TABLET, EXTENDED RELEASE ORAL DAILY PRN
Status: DISCONTINUED | OUTPATIENT
Start: 2024-08-28 | End: 2024-08-28 | Stop reason: HOSPADM

## 2024-08-28 RX ORDER — ONDANSETRON 2 MG/ML
4 INJECTION INTRAMUSCULAR; INTRAVENOUS ONCE AS NEEDED
Status: DISCONTINUED | OUTPATIENT
Start: 2024-08-28 | End: 2024-08-28 | Stop reason: HOSPADM

## 2024-08-28 RX ADMIN — ONDANSETRON 4 MG: 2 INJECTION INTRAMUSCULAR; INTRAVENOUS at 13:45

## 2024-08-28 RX ADMIN — CEFAZOLIN SODIUM 2000 MG: 2 SOLUTION INTRAVENOUS at 13:50

## 2024-08-28 RX ADMIN — FENTANYL CITRATE 50 MCG: 50 INJECTION INTRAMUSCULAR; INTRAVENOUS at 13:50

## 2024-08-28 RX ADMIN — DEXAMETHASONE SODIUM PHOSPHATE 10 MG: 10 INJECTION, SOLUTION INTRAMUSCULAR; INTRAVENOUS at 13:45

## 2024-08-28 RX ADMIN — SODIUM CHLORIDE, SODIUM LACTATE, POTASSIUM CHLORIDE, AND CALCIUM CHLORIDE 75 ML/HR: .6; .31; .03; .02 INJECTION, SOLUTION INTRAVENOUS at 00:30

## 2024-08-28 RX ADMIN — SODIUM CHLORIDE, SODIUM LACTATE, POTASSIUM CHLORIDE, AND CALCIUM CHLORIDE: .6; .31; .03; .02 INJECTION, SOLUTION INTRAVENOUS at 13:34

## 2024-08-28 RX ADMIN — LIDOCAINE HYDROCHLORIDE 50 MG: 10 INJECTION, SOLUTION EPIDURAL; INFILTRATION; INTRACAUDAL; PERINEURAL at 13:39

## 2024-08-28 RX ADMIN — FENTANYL CITRATE 50 MCG: 50 INJECTION INTRAMUSCULAR; INTRAVENOUS at 13:39

## 2024-08-28 RX ADMIN — FENTANYL CITRATE 50 MCG: 50 INJECTION INTRAMUSCULAR; INTRAVENOUS at 00:25

## 2024-08-28 RX ADMIN — PROPOFOL 200 MG: 10 INJECTION, EMULSION INTRAVENOUS at 13:39

## 2024-08-28 NOTE — TELEPHONE ENCOUNTER
Patient status post left ureteroscopy and stone removal at [place] on 8/28/2024    He has 6 x 26 double-J ureteral stent in place.  String attached.    On 9/2/2024, he can take off the tape and pulled the string to remove his stent    Can you have him follow-up with [facility] in 4 months with kidney bladder ultrasound beforehand?  I will place order in this encounter.

## 2024-08-28 NOTE — EMTALA/ACUTE CARE TRANSFER
[facility] EMERGENCY DEPARTMENT  [address] [facility] [place] [facility] [address]  Dept: [phone]      EMTALA TRANSFER CONSENT    NAME Bennie Finn                                         1980                              MRN [id number]    I have been informed of my rights regarding examination, treatment, and transfer   by Dr. Gómez Cochran DO    Benefits: Specialized equipment and/or services available at the receiving facility (Include comment)________________________, Continuity of care (Urology service)    Risks: Potential for delay in receiving treatment, Potential deterioration of medical condition, Loss of IV, Increased discomfort during transfer, Possible worsening of condition or death during transfer      Consent for Transfer:  I acknowledge that my medical condition has been evaluated and explained to me by the emergency department physician or other qualified medical person and/or my attending physician, who has recommended that I be transferred to the service of  Accepting Physician: Dr. Chapman at Accepting Facility Name, City & State : [facility]. The above potential benefits of such transfer, the potential risks associated with such transfer, and the probable risks of not being transferred have been explained to me, and I fully understand them.  The doctor has explained that, in my case, the benefits of transfer outweigh the risks.  I agree to be transferred.    I authorize the performance of emergency medical procedures and treatments upon me in both transit and upon arrival at the receiving facility.  Additionally, I authorize the release of any and all medical records to the receiving facility and request they be transported with me, if possible.  I understand that the safest mode of transportation during a medical emergency is an ambulance and that the Hospital advocates the use of this mode of transport. Risks of traveling to the receiving  facility by car, including absence of medical control, life sustaining equipment, such as oxygen, and medical personnel has been explained to me and I fully understand them.    (CYNTHIA CORRECT BOX BELOW)  [  ]  I consent to the stated transfer and to be transported by ambulance/helicopter.  [  ]  I consent to the stated transfer, but refuse transportation by ambulance and accept full responsibility for my transportation by car.  I understand the risks of non-ambulance transfers and I exonerate the Hospital and its staff from any deterioration in my condition that results from this refusal.    X___________________________________________    DATE  24  TIME________  Signature of patient or legally responsible individual signing on patient behalf           RELATIONSHIP TO PATIENT_________________________          Provider Certification    NAME Bennie K Aakash                                        [facility] 1980                              MRN [id number]    A medical screening exam was performed on the above named patient.  Based on the examination:    Condition Necessitating Transfer The primary encounter diagnosis was Ureteral calculus. Diagnoses of Left ureteral stone, Hydroureteronephrosis, Umbilical hernia, and Leukocytosis were also pertinent to this visit.    Patient Condition: The patient has been stabilized such that within reasonable medical probability, no material deterioration of the patient condition or the condition of the unborn child(araceli) is likely to result from the transfer    Reason for Transfer: Level of Care needed not available at this facility, Other (Include comment)____________________ (Urology)    Transfer Requirements: Facility [facility]   Space available and qualified personnel available for treatment as acknowledged by    Agreed to accept transfer and to provide appropriate medical treatment as acknowledged by       Dr. Chapman  Appropriate medical records of the  examination and treatment of the patient are provided at the time of transfer   STAFF INITIAL WHEN COMPLETED _______  Transfer will be performed by qualified personnel from    and appropriate transfer equipment as required, including the use of necessary and appropriate life support measures.    Provider Certification: I have examined the patient and explained the following risks and benefits of being transferred/refusing transfer to the patient/family:  General risk, such as traffic hazards, adverse weather conditions, rough terrain or turbulence, possible failure of equipment (including vehicle or aircraft), or consequences of actions of persons outside the control of the transport personnel, Unanticipated needs of medical equipment and personnel during transport, Risk of worsening condition      Based on these reasonable risks and benefits to the patient and/or the unborn child(araceli), and based upon the information available at the time of the patient’s examination, I certify that the medical benefits reasonably to be expected from the provision of appropriate medical treatments at another medical facility outweigh the increasing risks, if any, to the individual’s medical condition, and in the case of labor to the unborn child, from effecting the transfer.    X____________________________________________ DATE 08/27/24        TIME_______      ORIGINAL - SEND TO MEDICAL RECORDS   COPY - SEND WITH PATIENT DURING TRANSFER

## 2024-08-28 NOTE — H&P
UROLOGY H&P NOTE     Patient Identifiers: Bennie Finn (MRN [id number])    Date of Service: 8/28/2024    History of Present Illness:     Bennie Finn is a 43 y.o. old with a history of urolithiasis    Past Medical, Past Surgical History:     Past Medical History:   Diagnosis Date    Cholelithiasis     GERD (gastroesophageal reflux disease)     Herniation of intervertebral disc of lumbar spine     IBS (irritable bowel syndrome)    :    Past Surgical History:   Procedure Laterality Date    CHOLECYSTECTOMY      COLONOSCOPY      DC ESOPHAGOGASTRODUODENOSCOPY TRANSORAL DIAGNOSTIC N/A 05/02/2019    Procedure: ESOPHAGOGASTRODUODENOSCOPY (EGD);  Surgeon: Heber Mckee MD;  Location: [facility];  Service: Gastroenterology    DC LAPAROSCOPY SURG CHOLECYSTECTOMY N/A 01/09/2017    Procedure: CHOLECYSTECTOMY LAPAROSCOPIC;  Surgeon: Ming Washington MD;  Location: [facility];  Service: General    DC LAPAROSCOPY SURG RPR INITIAL INGUINAL HERNIA Bilateral 3/1/2024    Procedure: REPAIR HERNIA INGUINAL LAPAROSCOPIC W/ ROBOTICS;  Surgeon: Benito Varner MD;  Location: [facility];  Service: General    UPPER GASTROINTESTINAL ENDOSCOPY      WISDOM TOOTH EXTRACTION     :    Medications, Allergies:     No current facility-administered medications for this encounter.       Allergies:  Allergies   Allergen Reactions    Other      Annotation - [id number]: poison ivy/oak   :    Social and Family History:   Social History:   Social History     Tobacco Use    Smoking status: Never    Smokeless tobacco: Never   Vaping Use    Vaping status: Never Used   Substance Use Topics    Alcohol use: Yes     Comment: occassionally on holidays    Drug use: No   .    Social History     Tobacco Use   Smoking Status Never   Smokeless Tobacco Never       Family History:  Family History   Problem Relation Age of Onset    Nephrolithiasis Mother     Arthritis Mother     Hyperlipidemia Mother     Heart attack Father     Hypertension Father     Stroke  Father     Sudden death Father     Hyperlipidemia Father     Heart disease Father     Coronary artery disease Maternal Grandfather     Alcohol abuse Neg Hx     Substance Abuse Neg Hx     Mental illness Neg Hx     Colon cancer Neg Hx     Colon polyps Neg Hx    :     Review of Systems:     General: Fever, chills, or night sweats: negative  Cardiac: Negative for chest pain.    Pulmonary: Negative for shortness of breath.  Gastrointestinal: Abdominal pain negative.  Nausea, vomiting, or diarrhea negative,  Genitourinary: See HPI above.  Patient does not have hematuria.  All other systems queried were negative.    Physical Exam:   General: Patient is pleasant and in NAD. Awake and alert  There were no vitals taken for this visit.Temp (24hrs), Av.4 °F (36.9 °C), Min:98.4 °F (36.9 °C), Max:98.4 °F (36.9 °C)  current;    No intake/output data recorded.  Cardiac: Peripheral edema: negative  Pulmonary: Non-labored breathing  Abdomen: Soft, non-tender, non-distended.  No surgical scars.  No masses, tenderness, hernias noted.    Genitourinary: Positive CVA tenderness, negative suprapubic tenderness.    Labs:     Lab Results   Component Value Date    HGB 14.2 2024    HCT 40.1 2024    WBC 10.25 (H) 2024     2024   ]    Lab Results   Component Value Date    K 3.7 2024     2024    CO2 26 2024    BUN 14 2024    CREATININE 1.58 (H) 2024    CALCIUM 9.5 2024   ]    Imaging:   I personally reviewed the images and report of the following studies, and reviewed them with the patient:    Reviewed, see below      ASSESSMENT:     43 y.o. old male with urolithiasis    Initially presented to the ED on 2024 with left flank pain    CT abdomen pelvis without contrast 2024: 4 mm left ureteral stone with mild left hydronephrosis    He was discharged on medical expulsive therapy and told to follow-up with urology    Kidney bladder ultrasound 2024: 6 mm stone at  "the left UVJ with mild left hydronephrosis    He presented again to the ED on 8/27/2024 with continued left flank pain    CT abdomen pelvis without contrast 8/27/2024: Previously noted left ureteral stone now located at the left UVJ measuring 4 mm resulting in severe left hydroureteronephrosis and extensive perinephric stranding    Labs 8/27/2024: White count 10.25, hemoglobin 14.2, creatinine 1.58    UA micro 8/27/2024: 4-10 RBC, 0-1 WBC, no bacteria    Due to uncontrolled pain and continued inability to pass the stone with severe hydronephrosis on imaging and ITZ, he was transferred to [place] for surgical management      We discussed ureteroscopy     I explained that ureteroscopy consisted of patient going to sleep and having scope initially placed into the urethra and bladder.  From there, x-rays would be shot in the ureter and kidney in order to assess for safety wire placement and stone location.  At that point, if possible, a smaller scope would be placed into the ureter and/or kidney to look for stones.  Once the stones were located, they would either be removed with a basket, or lasered into smaller pieces.  Once they were broken up into smaller pieces, they would either be \"dusted\" into very small fragments that would pass on their own or \"fragmented\" into slightly larger pieces that would be able to be removed in their entirety with the basket.    I explained that after the procedure, most patients needed placement of a ureteral stent.  I explained that ureteral stent is essentially a straw with 2 curly ends. One curl is in the kidney and the other curl is in the bladder.  The stent would allow urine to safely pass from the kidney to the bladder without obstruction.  I explained that in the vast majority of circumstances, the placement of the ureteral stent was not permanent.  I explained that sometimes we are able to leave a string coming out of the end of the stent which comes out of the urethra.  I " explained that these types of patients will get instructions in their discharge packet which tells them when to pull the string and remove the stent in its entirety.  I also explained that there are circumstances where we are not able to do this and there is no string coming off the stent.  In these cases, the patient's would need to come to the office for a quick 2-minute procedure called cystoscopy, removal of ureteral stent.    I also explained that there are unfortunately times where we are not able to get up with a smaller scope into the ureter.  In the circumstances, we place ureteral stent and have patient come back to the OR a few weeks later so that the ureter is more dilated and can better tolerate a scope.    I explained that based on data from the urine culture and the appearance of the urine during the case, the patient may or may not be discharged with antibiotics after the procedure.    I did explain that having ureteral stent in place can be quite uncomfortable for some patients.  I explained that I do not routinely give patient's narcotics for this type of procedure.  The patient verbalized understanding.  I did state that I often prescribe medications to help with stent discomfort including but not limited to: Daily tamsulosin 0.4 mg, daily as needed oxybutynin 5 mg XR, as needed diclofenac 50 mg twice daily.  I also told the patient that I often have patients use over-the-counter Tylenol around-the-clock for the first few days.  I also explained the most important thing for pain often is staying very well-hydrated and that patients should plan to drink plenty of water after the procedure.    I explained that it can be normal to have blood in urine after this procedure.  I explained that as long as the urine was lighter than fruit punch and that there were no blood clots being passed, and that the patient was able to urinate, nothing urgently needed to be done about blood in the urine.  I did  explain that it was very important to stay hydrated after the procedure.    We also went over the risk of infection from the procedure.  I explained that if the urine did appear to have a very infected appearance, there are times where we have to simply place a ureteral stent and defer definitive stone management a few weeks later after the patient gets additional antibiotics.  I also explained that there is a low, but nonzero risk of developing a serious infection after stone treatment which would potentially require hospitalization and IV antibiotics.    I also explained that there was a risk of injury to urethra, bladder, ureter, kidneys during the procedure.  I explained that if there appeared to be any serious injury to the urethra bladder, we would likely just leave Webb catheter for a number of days.  I explained that if there appeared to be extensive injury to the ureter or kidney, we often would elect to leave a ureteral stent in place for a longer period of time.  I explained that in extremely rare circumstances, there would be a severe injury to the ureter or kidney which would actually require placement of percutaneous nephrostomy tube in order for proper drainage of the urine.    I also explained that there are of course risks to getting general anesthesia such as cardiac or pulmonary complications.  I also explained that there could be risk of developing blood clots.    Additionally, we did review alternative treatments which include extracorporal shockwave lithotripsy, percutaneous nephrolithotomy, observation.  We discussed the risks and benefits of each of these strategies.        PLAN:     -preop ancef  -OR plan as above  -admit post op, possible dc later today if feels well

## 2024-08-28 NOTE — OP NOTE
OPERATIVE REPORT  PATIENT NAME: Bennie Finn    :  1980  MRN: [id number]  Pt Location: [facility] 01    SURGERY DATE: 2024    Surgeons and Role:     * Amadou Chapman DO - Primary    Preop Diagnosis:  Left ureteral stone [N20.1]    Post-Op Diagnosis Codes:     * Left ureteral stone [N20.1]    Procedure(s):    Cystourethroscopy  Left retrograde pyelogram with live fluoroscopic interpretation  Left ureteroscopy  Stone basket extraction  Left ureteral stent placement        Specimen(s):  ID Type Source Tests Collected by Time Destination   A : Left Ureteral Stone Calculus Ureter, Left STONE ANALYSIS Amadou Chapman DO 2024 [id number]        Estimated Blood Loss:   Minimal    Drains:  * No LDAs found *    Anesthesia Type:   General    Operative Indications:  Left ureteral stone [N20.1]      43 y.o. old male with urolithiasis     Initially presented to the ED on 2024 with left flank pain     CT abdomen pelvis without contrast 2024: 4 mm left ureteral stone with mild left hydronephrosis     He was discharged on medical expulsive therapy and told to follow-up with urology     Kidney bladder ultrasound 2024: 6 mm stone at the left UVJ with mild left hydronephrosis     He presented again to the ED on 2024 with continued left flank pain     CT abdomen pelvis without contrast 2024: Previously noted left ureteral stone now located at the left UVJ measuring 4 mm resulting in severe left hydroureteronephrosis and extensive perinephric stranding     Labs 2024: White count 10.25, hemoglobin 14.2, creatinine 1.58     UA micro 2024: 4-10 RBC, 0-1 WBC, no bacteria     Due to uncontrolled pain and continued inability to pass the stone with severe hydronephrosis on imaging and ITZ, he was transferred to [place] for surgical management        Operative Findings:        No meatal stenosis  No urethral strictures  Prostate not enlarged  Bilateral ureteral orifices in  orthotopic positions  No bladder lesions  No stones in bladder  No trabeculations  No diverticuli  Left retrograde pyelogram: Moderate hydronephrosis, no filling defects in the kidney  Left distal and mid ureteroscopy with semirigid ureteroscope demonstrated 4 mm distal ureteral stone.  Stone removed with Skylight basket.  Back out ureteroscopy: No stone fragments, no ureteral injury  Successfully placed 6 x 26 double-J ureteral stent with string attached              Complications:   None    Procedure and Technique:               Patient identified in the preop holding area.  Consent was obtained.  Risks and benefits of the procedure were explained to the patient.  Patient was in agreement.  Patient was brought back to the OR and placed upon the table.  Bilateral lower extremity SCDs were placed and turned on.  Patient received IV Ancef for antibiotics.  Patient underwent smooth induction of anesthesia.  Bilateral lower extremities were placed in stirrups.  Patient was in dorsal lithotomy position.  Area was prepped and draped in sterile fashion.  Timeout was performed by the OR team.     Case began with insertion of [place] rigid cystoscope.  Pan cystourethroscopy was performed.  See the above findings for details.     Attention was turned toward the left ureteral orifice.      5 [place] open-ended catheter was advanced through the bridge of the scope toward the ureteral orifice.  Guidewire was advanced through the 5 [place] open-ended catheter, into the ureter, and coiled in renal pelvis under fluoroscopic guidance.      Semirigid ureteroscope was assembled and entered into the urethra and bladder.  The scope was navigated into the left ureteral orifice.  See above findings for details regarding stone works.  After stone was removed, the scope was driven up into the mid ureter.  No stones present.  Retrograde pyelogram was performed through the scope.  See above findings for details.      Back out semirigid  ureteroscopy was then performed.  See above findings for details.      The ureteroscope was then removed from the bladder and urethra.     Cystoscope was reentered into the bladder over the wire toward the left ureteral orifice.  6 x 26 double-J ureteral stent was advanced over the wire toward the renal pelvis under fluoroscopic guidance.  Wire was removed and stent was deployed.  Good proximal curl was seen on fluoroscopy.  Good distal curl was seen on direct cystoscopy.        A string was left on the stent and externalized out of urethral meatus.    String was secured to the dorsal aspect of the penis using benzoin and Tegaderm.      Bladder was emptied and scope was removed.     Patient was uneventfully awoken from anesthesia and extubated.  Patient was brought to PACU in good condition.               A qualified resident physician was not available.    Patient Disposition:  PACU         SIGNATURE: Amadou Chapman DO  DATE: August 28, 2024  TIME: 2:08 PM        Plan  - Left-sided 6 x 26 double-J ureteral stent.  String attached.  He can remove his stent on 9/2/2024.  - Plan to discharge patient from hospital today  - Follow-up with [facility] in about 4 months with ultrasound

## 2024-08-28 NOTE — ED PROVIDER NOTES
History  Chief Complaint   Patient presents with    Flank Pain     Pt reports having left sided abd pain. Pt is n/v. Pt denies fevers.     Patient is a 43-year-old male with past medical history of GERD, cholecystectomy, IBS, presenting to the emergency department for evaluation of left-sided flank pain that initially started today with associated nausea. He describes the pain as constant, aching. Patient also reports dysuria, denies hematuria, urgency, frequency. Patient denies chest pain, shortness of breath, fever, chills. He was evaluated in the emergency department on 08/01/2024, where he was found to have a obstructive 4 mm proximal left ureteral calculus with mild left hydronephrosis, however given his stability at that time, patient was discharged with plan for NSAIDs, Tylenol as needed, oxycodone as needed, Flomax, straining of all urine, and follow-up with urology. Kidney bladder ultrasound 8/24/2024 showing 6 mm stone at the left UVJ with mild left hydronephrosis.              Flank Pain  Associated symptoms: dysuria, nausea and vomiting    Associated symptoms: no chest pain, no chills, no constipation, no cough, no diarrhea, no fever, no hematuria and no shortness of breath        Prior to Admission Medications   Prescriptions Last Dose Informant Patient Reported? Taking?   Loperamide HCl (IMODIUM PO)  Self Yes No   Sig: Take by mouth   tamsulosin (FLOMAX) 0.4 mg   No No   Sig: Take 1 capsule (0.4 mg total) by mouth daily with dinner for 14 days      Facility-Administered Medications: None       Past Medical History:   Diagnosis Date    Cholelithiasis     GERD (gastroesophageal reflux disease)     Herniation of intervertebral disc of lumbar spine     IBS (irritable bowel syndrome)        Past Surgical History:   Procedure Laterality Date    CHOLECYSTECTOMY      COLONOSCOPY      FL RETROGRADE PYELOGRAM  8/28/2024    [place] ESOPHAGOGASTRODUODENOSCOPY TRANSORAL DIAGNOSTIC N/A 05/02/2019    Procedure:  ESOPHAGOGASTRODUODENOSCOPY (EGD);  Surgeon: Heber Mckee MD;  Location: [facility];  Service: Gastroenterology    MN LAPAROSCOPY SURG CHOLECYSTECTOMY N/A 01/09/2017    Procedure: CHOLECYSTECTOMY LAPAROSCOPIC;  Surgeon: Ming Washington MD;  Location: [facility];  Service: General    MN LAPAROSCOPY SURG RPR INITIAL INGUINAL HERNIA Bilateral 3/1/2024    Procedure: REPAIR HERNIA INGUINAL LAPAROSCOPIC W/ ROBOTICS;  Surgeon: Benito Varner MD;  Location: [facility];  Service: General    UPPER GASTROINTESTINAL ENDOSCOPY      WISDOM TOOTH EXTRACTION         Family History   Problem Relation Age of Onset    Nephrolithiasis Mother     Arthritis Mother     Hyperlipidemia Mother     Heart attack Father     Hypertension Father     Stroke Father     Sudden death Father     Hyperlipidemia Father     Heart disease Father     Coronary artery disease Maternal Grandfather     Alcohol abuse Neg Hx     Substance Abuse Neg Hx     Mental illness Neg Hx     Colon cancer Neg Hx     Colon polyps Neg Hx      I have reviewed and agree with the history as documented.    E-Cigarette/Vaping    E-Cigarette Use Never User      E-Cigarette/Vaping Substances    Nicotine No     THC No     CBD No     Flavoring No     Other No     Unknown No      Social History     Tobacco Use    Smoking status: Never    Smokeless tobacco: Never   Vaping Use    Vaping status: Never Used   Substance Use Topics    Alcohol use: Yes     Comment: occassionally on holidays    Drug use: No       Review of Systems   Constitutional:  Negative for chills and fever.   Eyes:  Negative for pain and visual disturbance.   Respiratory:  Negative for cough and shortness of breath.    Cardiovascular:  Negative for chest pain and palpitations.   Gastrointestinal:  Positive for nausea and vomiting. Negative for abdominal distention, abdominal pain, constipation and diarrhea.   Genitourinary:  Positive for dysuria, flank pain and urgency. Negative for hematuria and testicular pain.    Musculoskeletal:  Negative for arthralgias and back pain.   Skin:  Negative for color change and rash.   Neurological:  Negative for dizziness, tremors, syncope, weakness and light-headedness.   All other systems reviewed and are negative.      Physical Exam  Physical Exam  Vitals and nursing note reviewed.   Constitutional:       General: He is not in acute distress.     Appearance: Normal appearance. He is well-developed. He is not ill-appearing.   HENT:      Head: Normocephalic and atraumatic.   Eyes:      Conjunctiva/sclera: Conjunctivae normal.   Cardiovascular:      Rate and Rhythm: Normal rate and regular rhythm.      Heart sounds: No murmur heard.  Pulmonary:      Effort: Pulmonary effort is normal. No respiratory distress.      Breath sounds: Normal breath sounds.   Abdominal:      General: Abdomen is flat.      Palpations: Abdomen is soft.      Tenderness: There is no abdominal tenderness. There is no right CVA tenderness, left CVA tenderness, guarding or rebound.   Musculoskeletal:         General: No tenderness or deformity. Normal range of motion.      Cervical back: Normal and neck supple.      Thoracic back: Normal.      Lumbar back: Normal.   Skin:     General: Skin is warm and dry.      Capillary Refill: Capillary refill takes less than 2 seconds.      Findings: No erythema or rash.   Neurological:      Mental Status: He is alert.   Psychiatric:         Mood and Affect: Mood normal.         Vital Signs  ED Triage Vitals   Temperature Pulse Respirations Blood Pressure SpO2   08/27/24 1725 08/27/24 1725 08/27/24 1725 08/27/24 1725 08/27/24 1725   98.4 °F (36.9 °C) 75 18 144/85 98 %      Temp src Heart Rate Source Patient Position - Orthostatic VS BP Location FiO2 (%)   -- 08/27/24 2115 08/28/24 0700 08/28/24 0700 --    Monitor Sitting Right arm       Pain Score       08/27/24 1725       8           Vitals:    08/28/24 0700 08/28/24 0708 08/28/24 0800 08/28/24 0905   BP: 117/69 117/69 129/75 134/71    Pulse: 72 86 72 64   Patient Position - Orthostatic VS: Sitting Lying Sitting Lying         Visual Acuity  Visual Acuity      Flowsheet Row Most Recent Value   L Pupil Size (mm) 3   R Pupil Size (mm) 3            ED Medications  Medications   sodium chloride 0.9 % bolus 1,000 mL (0 mL Intravenous Stopped 8/27/24 2111)   acetaminophen (Ofirmev) injection 1,000 mg (0 mg Intravenous Stopped 8/27/24 2026)   morphine injection 4 mg (4 mg Intravenous Given 8/27/24 2300)   fentaNYL injection 50 mcg (50 mcg Intravenous Given 8/28/24 0025)       Diagnostic Studies  Results Reviewed       Procedure Component Value Units Date/Time    Urine Microscopic [[id number]]  (Abnormal) Collected: 08/27/24 1933    Lab Status: Final result Specimen: Urine, Clean Catch Updated: 08/27/24 1956     RBC, UA 4-10 /hpf      WBC, UA 0-1 /hpf      Epithelial Cells None Seen /hpf      Bacteria, UA None Seen /hpf      MUCUS THREADS None Seen    UA w Reflex to Microscopic w Reflex to Culture [[id number]]  (Abnormal) Collected: 08/27/24 1933    Lab Status: Final result Specimen: Urine, Clean Catch Updated: 08/27/24 1942     Color, UA Yellow     Clarity, UA Clear     Specific Gravity, UA 1.015     pH, UA 6.5     Leukocytes, UA Negative     Nitrite, UA Negative     Protein, UA Negative mg/dl      Glucose, UA Negative mg/dl      Ketones, UA 40 (2+) mg/dl      Urobilinogen, UA <2.0 mg/dl      Bilirubin, UA Negative     Occult Blood, UA Small    Comprehensive metabolic panel [[id number]]  (Abnormal) Collected: 08/27/24 1727    Lab Status: Final result Specimen: Blood from Arm, Right Updated: 08/27/24 1757     Sodium 138 mmol/L      Potassium 3.7 mmol/L      Chloride 103 mmol/L      CO2 26 mmol/L      ANION GAP 9 mmol/L      BUN 14 mg/dL      Creatinine 1.58 mg/dL      Glucose 100 mg/dL      Calcium 9.5 mg/dL      AST 23 U/L      ALT 27 U/L      Alkaline Phosphatase 71 U/L      Total Protein 7.2 g/dL      Albumin 4.5 g/dL      Total Bilirubin 1.03 mg/dL       eGFR 52 ml/min/1.73sq m     Narrative:      National Kid[employer] Disease Foundation guidelines for Chronic Kidney Disease (CKD):     Stage 1 with normal or high GFR (GFR > 90 mL/min/1.73 square meters)    Stage 2 Mild CKD (GFR = 60-89 mL/min/1.73 square meters)    Stage 3A Moderate CKD (GFR = 45-59 mL/min/1.73 square meters)    Stage 3B Moderate CKD (GFR = 30-44 mL/min/1.73 square meters)    Stage 4 Severe CKD (GFR = 15-29 mL/min/1.73 square meters)    Stage 5 End Stage CKD (GFR <15 mL/min/1.73 square meters)  Note: GFR calculation is accurate only with a steady state creatinine    Lipase [[id number]]  (Normal) Collected: 08/27/24 1727    Lab Status: Final result Specimen: Blood from Arm, Right Updated: 08/27/24 1757     Lipase 24 u/L     CBC and differential [[id number]]  (Abnormal) Collected: 08/27/24 1727    Lab Status: Final result Specimen: Blood from Arm, Right Updated: 08/27/24 1741     WBC 10.25 Thousand/uL      RBC 4.69 Million/uL      Hemoglobin 14.2 g/dL      Hematocrit 40.1 %      MCV 86 fL      MCH 30.3 pg      MCHC 35.4 g/dL      RDW 13.0 %      MPV 10.3 fL      Platelets 259 Thousands/uL      nRBC 0 /100 WBCs      Segmented % 85 %      Immature Grans % 1 %      Lymphocytes % 8 %      Monocytes % 6 %      Eosinophils Relative 0 %      Basophils Relative 0 %      Absolute Neutrophils 8.69 Thousands/µL      Absolute Immature Grans 0.07 Thousand/uL      Absolute Lymphocytes 0.78 Thousands/µL      Absolute Monocytes 0.66 Thousand/µL      Eosinophils Absolute 0.03 Thousand/µL      Basophils Absolute 0.02 Thousands/µL                    FL retrograde pyelogram   Final Result by Domenic Gardner MD (08/28 1652)      Fluoroscopy provided for procedure guidance.      Please refer to the separate procedure note for additional details.                  Workstation performed: [id number]         CT renal stone study abdomen pelvis without contrast   Final Result by Brandon Nicholson MD (08/27 2059)      The previously  noted proximal left ureteral calculus is now located at the left UV junction measuring 4 mm on image 2/140 and is resulting in severe left hydroureteronephrosis and extensive perinephric stranding.         Workstation performed: [id number]                    Procedures  Procedures         ED Course                                 SBIRT 20yo+      Flowsheet Row Most Recent Value   Initial Alcohol Screen: US AUDIT-C     1. How often do you have a drink containing alcohol? 0 Filed at: 08/27/2024 1943   2. How many drinks containing alcohol do you have on a typical day you are drinking?  0 Filed at: 08/27/2024 1943   3a. Male UNDER 65: How often do you have five or more drinks on one occasion? 0 Filed at: 08/27/2024 1943   Audit-C Score 0 Filed at: 08/27/2024 1943   JANA: How many times in the past year have you...    Used an illegal drug or used a prescription medication for non-medical reasons? Never Filed at: 08/27/2024 1943                      Medical Decision Making  Patient in no acute distress, afebrile. Differential diagnosis includes, but is not limited to, renal colic, UTI, pyelonephritis, other visceral conditions, muscle strain, etc. Plan: CBC, CMP, U/A, Lipase, CT renal stone study. IVF, Tylenol.     Mild leukocytosis 10.25, Cr 1.58, BUN 14. All other findings as noted above. CT showing: The previously noted proximal left ureteral calculus is now located at the left UV junction measuring 4 mm on image 2/140 and is resulting in severe left hydroureteronephrosis and extensive perinephric stranding. Urology consulted, Ronna Galeas, and due to uncontrolled pain and continued inability to pass the stone with severe hydronephrosis on imaging and ITZ, patient will be transferred to [place] for surgical management. Patient agreeable to plan. All questions answered at bedside. Pain control. PAC notified and EMTALA documents signed. Patient care signed out to Dr. Cochran pending transfer.     Amount and/or  Complexity of Data Reviewed  Labs: ordered.  Radiology: ordered.    Risk  Prescription drug management.                 Disposition  Final diagnoses:   Ureteral calculus   Hydroureteronephrosis   Umbilical hernia   Leukocytosis     Time reflects when diagnosis was documented in both MDM as applicable and the Disposition within this note       Time User Action Codes Description Comment    8/27/2024  9:25 PM BryantClaudio mattruth MULLER Add [N20.1] Left ureteral stone     8/27/2024 10:24 PM Grygiel, Carla Add [N20.1] Ureteral calculus     8/27/2024 10:24 PM Grygiel, Carla Modify [N20.1] Left ureteral stone     8/27/2024 10:24 PM Grygiel, Carla Modify [N20.1] Ureteral calculus     8/27/2024 10:24 PM Grygiel, Carla Add [N13.30] Hydroureteronephrosis     8/27/2024 10:24 PM Grygiel, Carla Add [K42.9] Umbilical hernia     8/27/2024 10:25 PM Grygiel, Carla Add [D72.829] Leukocytosis           ED Disposition       ED Disposition   Transfer to Another Facility-In Network    Condition   --    Date/Time   Tue Aug 27, 2024 2222    Comment   Bennie Finn should be transferred out to [facility]               MD Documentation      Flowsheet Row Most Recent Value   Patient Condition The patient has been stabilized such that within reasonable medical probability, no material deterioration of the patient condition or the condition of the unborn child(araceli) is likely to result from the transfer   Reason for Transfer Level of Care needed not available at this facility, Other (Include comment)____________________  [Urology]   Benefits of Transfer Specialized equipment and/or services available at the receiving facility (Include comment)________________________, Continuity of care  [Urology service]   Risks of Transfer Potential for delay in receiving treatment, Potential deterioration of medical condition, Loss of IV, Increased discomfort during transfer, Possible worsening of condition or death during transfer    Accepting Physician Dr. Chapman   Accepting Facility Name, [facility]   Sending MD Dimas DO, Grygiel PA-C   Provider Certification General risk, such as traffic hazards, adverse weather conditions, rough terrain or turbulence, possible failure of equipment (including vehicle or aircraft), or consequences of actions of persons outside the control of the transport personnel, Unanticipated needs of medical equipment and personnel during transport, Risk of worsening condition          RN Documentation      Flowsheet Row Most Recent Value   Accepting Facility Name, [facility]          Follow-up Information    None         Discharge Medication List as of 8/28/2024 10:59 AM        CONTINUE these medications which have NOT CHANGED    Details   Loperamide HCl (IMODIUM PO) Take by mouth, Historical Med      tamsulosin (FLOMAX) 0.4 mg Take 1 capsule (0.4 mg total) by mouth daily with dinner for 14 days, Starting u 8/8/2024, Until u 8/22/2024, Normal      ibuprofen (MOTRIN) 600 mg tablet Multiple Dosages:Starting Thu 8/1/2024, Until Sat 8/3/2024 at 2359, THEN Starting Sun 8/4/2024, Until Tue 8/6/2024 at 2359Take 1 tablet (600 mg total) by mouth 3 (three) times a day with meals for 3 days, THEN 1 tablet (600 mg total) 3 (three) times a  day as needed for mild pain for up to 3 days., Normal      oxyCODONE (Roxicodone) 5 immediate release tablet Take 1 tablet (5 mg total) by mouth every 6 (six) hours as needed for severe pain Max Daily Amount: 20 mg, Starting Thu 8/1/2024, Normal      oxyCODONE-acetaminophen (PERCOCET) 5-325 mg per tablet Take 1 tablet by mouth every 4 (four) hours as needed for moderate pain for up to 10 doses Max Daily Amount: 6 tablets, Starting Mon 2/26/2024, Normal             No discharge procedures on file.    PDMP Review       None            ED Provider  Electronically Signed by             Carla Plascencia PA-C  08/29/24 1038

## 2024-08-28 NOTE — ANESTHESIA PREPROCEDURE EVALUATION
Procedure:  CYSTOSCOPY URETEROSCOPY WITH LITHOTRIPSY HOLMIUM LASER, RETROGRADE PYELOGRAM AND INSERTION STENT URETERAL (Left: Bladder)    Relevant Problems   GI/HEPATIC   (+) Gastroesophageal reflux disease without esophagitis        Physical Exam    Airway    Mallampati score: I  TM Distance: >3 FB  Neck ROM: full     Dental       Cardiovascular  Cardiovascular exam normal    Pulmonary  Pulmonary exam normal     Other Findings        Anesthesia Plan  ASA Score- 2     Anesthesia Type- general with ASA Monitors.         Additional Monitors:     Airway Plan: LMA.           Plan Factors-Exercise tolerance (METS): >4 METS.    Chart reviewed. EKG reviewed.  Existing labs reviewed. Patient summary reviewed.    Patient is not a current smoker.  Patient did not smoke on day of surgery.    Obstructive sleep apnea risk education given perioperatively.        Induction- intravenous.    Postoperative Plan- Plan for postoperative opioid use. Planned trial extubation    Perioperative Resuscitation Plan - Level 1 - Full Code.       Informed Consent- Anesthetic plan and risks discussed with patient.  I personally reviewed this patient with the CRNA. Discussed and agreed on the Anesthesia Plan with the CRNA..

## 2024-08-28 NOTE — DISCHARGE INSTR - AVS FIRST PAGE
Mr. Finn,          He did have a stone that was stuck at the bottom of your left ureter.  I was able to remove it.  You should have no stones left in your ureter or kidney on the left side.    You do have a stent.  Overall your ureter was pretty open, so I did opt to leave a string on the stent.    Please see postoperative instructions for details.    Please call office with any questions or concerns.        Sincerely,  Amadou Chapman            You had procedure on your ureter and kidney.      You had ureteral stent placed.  This is a tube that is placed in your ureter to keep urine draining from your kidney to your bladder.  It may cause discomfort in the form of back spasms or lower abdominal spasms.  This is normal and can be treated with medications if need be.      You may see some blood in your urine.  This is normal.  Please drink plenty of fluids.      Please call the office if you notice that you are passing large blood clots or if you are unable to urinate.      You already have been given a prescription for Flomax.  Please take this daily while your ureteral stent is in place. This medication can occasionally cause lightheadedness. Please stop medication if you experience this or any other concerning side effects.      You have been given a prescription for oxybutynin.  Please take this daily as needed for stent discomfort. Please note that this medication may have side effects including but not limited to: dry eyes, dry mouth, constipation, urinary retention. Please drink plenty of water with this medication.    You may take Tylenol as needed for pain.      Pain control plan: Having a ureteral stent is often very uncomfortable. In order to stay on top of your pain, please take over the counter Tylenol around the clock. Additionally take Flomax daily with your stent in place whether or not you are having discomfort. Additionally if you are having discomfort, you need to take your oxybutynin daily,  as this helps a lot with bladder spasms and stent discomfort.     Most importantly, please drink lots of fluids, as this is the best way to avoid pain with your stent!      Your ureteral stent is attached to a string.  The string is coming out of your penis and is taped to the back of your penis.  On 9/2/24, you may peel off the tape and pulled the string to remove your stent.  You should be able to remove the stent in its entirety.  A picture of a ureteral stent is below for your reference. Please call the office if you have any difficulty removing your ureteral stent or if you the stent falls out prior to date of planned removal.          With the string coming out of your urethra and taped to your body, you can still shower as usual starting the day after surgery. Try not to scrub the clear tape when you shower.    You do not have any incisions and can resume typical physical activities, as long as you are not having too much discomfort with stent in place.    Please call the office or present to the ED if you experience concerning signs or symptoms including but not limited to: fevers, chills, nausea, vomiting, worsening flank pain, worsening abdominal pain, passage of large blood clots in the urine, inability to urinate.    You will follow up in the office in about 4 months with ultrasound beforehand. Office will call you with details.

## 2024-08-30 NOTE — ED CARE HANDOFF
Emergency Department Sign Out Note        Sign out and transfer of care from Carla Plascencia PA-C. See Separate Emergency Department note.     The patient, Bennie Finn, was evaluated by the previous provider for abdominal/flank pain.    Workup Completed:  Labs, UA, CT    ED Course / Workup Pending (followup):  Pending transfer to [facility] for urology evaluation.                                     Procedures  Medical Decision Making  Amount and/or Complexity of Data Reviewed  Labs: ordered.  Radiology: ordered.    Risk  Prescription drug management.            Disposition  Final diagnoses:   Ureteral calculus   Hydroureteronephrosis   Umbilical hernia   Leukocytosis     Time reflects when diagnosis was documented in both MDM as applicable and the Disposition within this note       Time User Action Codes Description Comment    8/27/2024  9:25 PM Ronna Galeas Add [N20.1] Left ureteral stone     8/27/2024 10:24 PM Grygiel, Carla Add [N20.1] Ureteral calculus     8/27/2024 10:24 PM Grygiel, Carla Modify [N20.1] Left ureteral stone     8/27/2024 10:24 PM Grygiel, Carla Modify [N20.1] Ureteral calculus     8/27/2024 10:24 PM Grygiel, Carla Add [N13.30] Hydroureteronephrosis     8/27/2024 10:24 PM Grygiel, Carla Add [K42.9] Umbilical hernia     8/27/2024 10:25 PM Grygiel, Carla Add [D72.829] Leukocytosis           ED Disposition       ED Disposition   Transfer to Another Facility-In Network    Condition   --    Date/Time   Tue Aug 27, 2024 10:22 PM    Comment   Bennie Finn should be transferred out to [facility] Urology [facility]               MD Documentation      Flowsheet Row Most Recent Value   Patient Condition The patient has been stabilized such that within reasonable medical probability, no material deterioration of the patient condition or the condition of the unborn child(araceli) is likely to result from the transfer   Reason for Transfer Level of Care needed not available at this  facility, Other (Include comment)____________________  [Urology]   Benefits of Transfer Specialized equipment and/or services available at the receiving facility (Include comment)________________________, Continuity of care  [Urology service]   Risks of Transfer Potential for delay in receiving treatment, Potential deterioration of medical condition, Loss of IV, Increased discomfort during transfer, Possible worsening of condition or death during transfer   Accepting Physician Dr. Chapman   Accepting Facility Name, [facility]   Sending MD Dimas DO, Temo ROJAS   Provider Certification General risk, such as traffic hazards, adverse weather conditions, rough terrain or turbulence, possible failure of equipment (including vehicle or aircraft), or consequences of actions of persons outside the control of the transport personnel, Unanticipated needs of medical equipment and personnel during transport, Risk of worsening condition          RN Documentation      Flowsheet Row Most Recent Value   Accepting Facility Name, [facility]          Follow-up Information    None       Discharge Medication List as of 8/28/2024 10:59 AM        CONTINUE these medications which have NOT CHANGED    Details   Loperamide HCl (IMODIUM PO) Take by mouth, Historical Med      tamsulosin (FLOMAX) 0.4 mg Take 1 capsule (0.4 mg total) by mouth daily with dinner for 14 days, Starting Thu 8/8/2024, Until u 8/22/2024, Normal      ibuprofen (MOTRIN) 600 mg tablet Multiple Dosages:Starting Thu 8/1/2024, Until Sat 8/3/2024 at 2359, THEN Starting Sun 8/4/2024, Until Tue 8/6/2024 at 2359Take 1 tablet (600 mg total) by mouth 3 (three) times a day with meals for 3 days, THEN 1 tablet (600 mg total) 3 (three) times a  day as needed for mild pain for up to 3 days., Normal      oxyCODONE (Roxicodone) 5 immediate release tablet Take 1 tablet (5 mg total) by mouth every 6 (six) hours as needed for severe pain  Max Daily Amount: 20 mg, Starting Thu 8/1/2024, Normal      oxyCODONE-acetaminophen (PERCOCET) 5-325 mg per tablet Take 1 tablet by mouth every 4 (four) hours as needed for moderate pain for up to 10 doses Max Daily Amount: 6 tablets, Starting Mon 2/26/2024, Normal           No discharge procedures on file.       ED Provider  Electronically Signed by     Gómez Cochran DO  08/29/24 [id number]

## 2024-08-30 NOTE — TELEPHONE ENCOUNTER
Called and left message for return call, number provided.    When patient calls back please ensure he understands self stent removal for 9/2/24. He can pre medicate with tylenol approx 45 mins prior to removal. Sten discomfort will subside 24-48 hrs after removal. He should continue Tylenol/NSAIDs during that time as well as aggressive hydration. He also needs 4 month AP follow up with US prior. He no longer needs appt on 9/5, this was cancelled. Please assist with scheduling.

## 2024-08-30 NOTE — TELEPHONE ENCOUNTER
Patient returned call and the message was relayed.    Pt is scheduled with ALEXIS Wadsworth on 12/19 at 9:20 AM for a 4 mo f/u.    Pt has the tel number to CS and will call within next few days  to schedule US 2 weeks prior to appt date.    Pt verbalized understanding and didn't have any questions.    No further action needed at this time.

## 2024-08-31 NOTE — Clinical Note
1140:   Name, , and allergies verified. Right hand 4th digit cleansed and irrigated with sterile 0.9 NS and sterile gauze. Applied 2x3 in Telfa with sterile gauze and secured with coban. Patient tolerated. Mother and patient provided with verbal instruction on wound care. Mother provided with school and cheer note. Verbalized understanding of instruction.    Timoteovikram Lanre was seen and treated in our emergency department on 6/15/2023  Diagnosis:     Kashif Oneill  may return to work on return date  He may return on this date: 06/19/2023    Ian tape toe as shown in ER and wear supportive shoes until feeling improved  If you have any questions or concerns, please don't hesitate to call        Malik Mendoza MD    ______________________________           _______________          _______________  Hospital Representative                              Date                                Time

## 2024-09-10 LAB
CALCIUM OXALATE DIHYDRATE MFR STONE IR: 30 %
COLOR STONE: NORMAL
COM MFR STONE: 70 %
COMMENT-STONE3: NORMAL
COMPOSITION: NORMAL
LABORATORY COMMENT REPORT: NORMAL
PHOTO: NORMAL
SIZE STONE: NORMAL MM
SPEC SOURCE SUBJ: NORMAL
STONE ANALYSIS-IMP: NORMAL
STONE ANALYSIS-IMP: NORMAL
WT STONE: 28 MG

## 2024-09-30 ENCOUNTER — APPOINTMENT (OUTPATIENT)
Dept: LAB | Facility: CLINIC | Age: 44
End: 2024-09-30
Payer: COMMERCIAL

## 2024-09-30 DIAGNOSIS — Z00.00 ANNUAL PHYSICAL EXAM: ICD-10-CM

## 2024-09-30 LAB
ALBUMIN SERPL BCG-MCNC: 4.3 G/DL (ref 3.5–5)
ALP SERPL-CCNC: 72 U/L (ref 34–104)
ALT SERPL W P-5'-P-CCNC: 28 U/L (ref 7–52)
ANION GAP SERPL CALCULATED.3IONS-SCNC: 9 MMOL/L (ref 4–13)
AST SERPL W P-5'-P-CCNC: 20 U/L (ref 13–39)
BASOPHILS # BLD AUTO: 0.03 THOUSANDS/ÂΜL (ref 0–0.1)
BASOPHILS NFR BLD AUTO: 1 % (ref 0–1)
BILIRUB SERPL-MCNC: 0.87 MG/DL (ref 0.2–1)
BUN SERPL-MCNC: 10 MG/DL (ref 5–25)
CALCIUM SERPL-MCNC: 9 MG/DL (ref 8.4–10.2)
CHLORIDE SERPL-SCNC: 103 MMOL/L (ref 96–108)
CHOLEST SERPL-MCNC: 177 MG/DL
CO2 SERPL-SCNC: 28 MMOL/L (ref 21–32)
CREAT SERPL-MCNC: 0.98 MG/DL (ref 0.6–1.3)
EOSINOPHIL # BLD AUTO: 0.08 THOUSAND/ÂΜL (ref 0–0.61)
EOSINOPHIL NFR BLD AUTO: 2 % (ref 0–6)
ERYTHROCYTE [DISTWIDTH] IN BLOOD BY AUTOMATED COUNT: 13.3 % (ref 11.6–15.1)
GFR SERPL CREATININE-BSD FRML MDRD: 92 ML/MIN/1.73SQ M
GLUCOSE P FAST SERPL-MCNC: 101 MG/DL (ref 65–99)
HCT VFR BLD AUTO: 43 % (ref 36.5–49.3)
HDLC SERPL-MCNC: 43 MG/DL
HGB BLD-MCNC: 14.4 G/DL (ref 12–17)
IMM GRANULOCYTES # BLD AUTO: 0.02 THOUSAND/UL (ref 0–0.2)
IMM GRANULOCYTES NFR BLD AUTO: 0 % (ref 0–2)
LDLC SERPL CALC-MCNC: 115 MG/DL (ref 0–100)
LYMPHOCYTES # BLD AUTO: 1.22 THOUSANDS/ÂΜL (ref 0.6–4.47)
LYMPHOCYTES NFR BLD AUTO: 22 % (ref 14–44)
MCH RBC QN AUTO: 29.3 PG (ref 26.8–34.3)
MCHC RBC AUTO-ENTMCNC: 33.5 G/DL (ref 31.4–37.4)
MCV RBC AUTO: 87 FL (ref 82–98)
MONOCYTES # BLD AUTO: 0.43 THOUSAND/ÂΜL (ref 0.17–1.22)
MONOCYTES NFR BLD AUTO: 8 % (ref 4–12)
NEUTROPHILS # BLD AUTO: 3.68 THOUSANDS/ÂΜL (ref 1.85–7.62)
NEUTS SEG NFR BLD AUTO: 67 % (ref 43–75)
NRBC BLD AUTO-RTO: 0 /100 WBCS
PLATELET # BLD AUTO: 308 THOUSANDS/UL (ref 149–390)
PMV BLD AUTO: 11.4 FL (ref 8.9–12.7)
POTASSIUM SERPL-SCNC: 4.4 MMOL/L (ref 3.5–5.3)
PROT SERPL-MCNC: 6.9 G/DL (ref 6.4–8.4)
RBC # BLD AUTO: 4.92 MILLION/UL (ref 3.88–5.62)
SODIUM SERPL-SCNC: 140 MMOL/L (ref 135–147)
TRIGL SERPL-MCNC: 93 MG/DL
TSH SERPL DL<=0.05 MIU/L-ACNC: 3.44 UIU/ML (ref 0.45–4.5)
WBC # BLD AUTO: 5.46 THOUSAND/UL (ref 4.31–10.16)

## 2024-09-30 PROCEDURE — 36415 COLL VENOUS BLD VENIPUNCTURE: CPT

## 2024-09-30 PROCEDURE — 80061 LIPID PANEL: CPT

## 2024-09-30 PROCEDURE — 84443 ASSAY THYROID STIM HORMONE: CPT

## 2024-09-30 PROCEDURE — 85025 COMPLETE CBC W/AUTO DIFF WBC: CPT

## 2024-09-30 PROCEDURE — 80053 COMPREHEN METABOLIC PANEL: CPT

## 2024-12-07 ENCOUNTER — HOSPITAL ENCOUNTER (OUTPATIENT)
Dept: ULTRASOUND IMAGING | Facility: HOSPITAL | Age: 44
Discharge: HOME | End: 2024-12-07
Attending: UROLOGY
Payer: COMMERCIAL

## 2024-12-07 DIAGNOSIS — N20.1 LEFT URETERAL STONE: ICD-10-CM

## 2024-12-07 PROCEDURE — 76775 US EXAM ABDO BACK WALL LIM: CPT

## 2024-12-19 ENCOUNTER — OFFICE VISIT (OUTPATIENT)
Dept: UROLOGY | Facility: MEDICAL CENTER | Age: 44
End: 2024-12-19
Payer: COMMERCIAL

## 2024-12-19 VITALS
OXYGEN SATURATION: 88 % | WEIGHT: 222 LBS | BODY MASS INDEX: 31.08 KG/M2 | DIASTOLIC BLOOD PRESSURE: 80 MMHG | SYSTOLIC BLOOD PRESSURE: 140 MMHG | HEIGHT: 71 IN | HEART RATE: 76 BPM

## 2024-12-19 DIAGNOSIS — N20.1 LEFT URETERAL STONE: ICD-10-CM

## 2024-12-19 DIAGNOSIS — Z12.11 ENCOUNTER FOR SCREENING COLONOSCOPY: Primary | ICD-10-CM

## 2024-12-19 PROCEDURE — 99213 OFFICE O/P EST LOW 20 MIN: CPT

## 2024-12-19 NOTE — PROGRESS NOTES
12/19/2024      Assessment and Plan    44 y.o. male managed by Dr. Buenrostro    Left ureteral stone  Status post cystoscopy, left uteroscopy with laser lithotripsy, basket stone extraction, retrograde pyelogram, and left ureteral stent placement performed 8/28/2024 for treatment of a 4 mm left ureteral stone with mild left-sided hydronephrosis  Stone analysis returned as calcium oxalate  Ultrasound of the kidney and bladder performed 12/7/2024 noted no remaining stone burden.  I did however note an extrarenal pelvis on the right kidney as well as a mild extra renal pelvis and the left kidney.  We discussed dietary modifications and recommendations for further kidney stone prevention.  We discussed that with no further stones visualized on ultrasound, that now would be the best time to implement these changes.  Patient will follow-up with the office on an as-needed basis at this time        History of Present Illness  Bennie Finn is a 44 y.o. male here for evaluation of nephrolithiasis status post cystoscopy, left uteroscopy with laser lithotripsy, basket stone extraction, retrograde pyelogram, and left ureteral stent placement performed 8/28/2024 for treatment of a 4 mm left ureteral stone with mild left-sided hydronephrosis.  Patient's stent was left on a string and he was advised to remove it 1 week following his procedure.  Patient was also advised undergo an ultrasound of the kidney and bladder in 4 months following his recent stone removal procedure.  Ultrasound of the kidney and bladder performed 12/7/2024 noted an extrarenal pelvis in the right kidney as well as mild pelvic extrarenal pelvis in the left kidney, but no hydronephrosis bilaterally or any remaining stone burden.  Today, the patient reports that for the first few days following his stent removal he had intermittent gross hematuria, but since that period has had no changes in his voiding pattern.  Patient is overall feeling well today and  "offers no new lower urinary tract complaints.        Review of Systems   Constitutional:  Negative for chills and fever.   HENT:  Negative for ear pain and sore throat.    Eyes:  Negative for pain and visual disturbance.   Respiratory:  Negative for cough and shortness of breath.    Cardiovascular:  Negative for chest pain and palpitations.   Gastrointestinal:  Negative for abdominal pain and vomiting.   Genitourinary:  Negative for decreased urine volume, difficulty urinating, dysuria, flank pain, frequency, hematuria and urgency.   Musculoskeletal:  Negative for arthralgias and back pain.   Skin:  Negative for color change and rash.   Neurological:  Negative for seizures and syncope.   All other systems reviewed and are negative.          AUA SYMPTOM SCORE      Flowsheet Row Most Recent Value   AUA SYMPTOM SCORE    How often have you had a sensation of not emptying your bladder completely after you finished urinating? 0 (P)     How often have you had to urinate again less than two hours after you finished urinating? 1 (P)     How often have you found you stopped and started again several times when you urinate? 0 (P)     How often have you found it difficult to postpone urination? 0 (P)     How often have you had a weak urinary stream? 0 (P)     How often have you had to push or strain to begin urination? 0 (P)     How many times did you most typically get up to urinate from the time you went to bed at night until the time you got up in the morning? 1 (P)     Quality of Life: If you were to spend the rest of your life with your urinary condition just the way it is now, how would you feel about that? 1 (P)     AUA SYMPTOM SCORE 2 (P)               Vitals  Vitals:    12/19/24 0906   BP: 140/80   BP Location: Left arm   Patient Position: Sitting   Cuff Size: Standard   Pulse: 76   SpO2: (!) 88%   Weight: 101 kg (222 lb)   Height: 5' 11\" (1.803 m)       Physical Exam  Vitals reviewed.   Constitutional:       General: " He is not in acute distress.     Appearance: Normal appearance. He is not ill-appearing.   HENT:      Head: Normocephalic and atraumatic.      Nose: Nose normal.   Eyes:      General: No scleral icterus.  Pulmonary:      Effort: No respiratory distress.   Abdominal:      General: Abdomen is flat. There is no distension.      Palpations: Abdomen is soft.      Tenderness: There is no abdominal tenderness.   Musculoskeletal:         General: Normal range of motion.      Cervical back: Normal range of motion.   Skin:     General: Skin is warm.      Coloration: Skin is not jaundiced.   Neurological:      Mental Status: He is alert and oriented to person, place, and time.      Gait: Gait normal.   Psychiatric:         Mood and Affect: Mood normal.         Behavior: Behavior normal.           Past History  Past Medical History:   Diagnosis Date    Cholelithiasis     GERD (gastroesophageal reflux disease)     Herniation of intervertebral disc of lumbar spine     IBS (irritable bowel syndrome)      Social History     Socioeconomic History    Marital status: Single     Spouse name: None    Number of children: None    Years of education: None    Highest education level: None   Occupational History    None   Tobacco Use    Smoking status: Never    Smokeless tobacco: Never   Vaping Use    Vaping status: Never Used   Substance and Sexual Activity    Alcohol use: Yes     Comment: occassionally on holidays    Drug use: No    Sexual activity: Not Currently     Partners: Female     Birth control/protection: Condom Male   Other Topics Concern    None   Social History Narrative    None     Social Drivers of Health     Financial Resource Strain: Not on file   Food Insecurity: Not on file   Transportation Needs: Not on file   Physical Activity: Not on file   Stress: Not on file   Social Connections: Not on file   Intimate Partner Violence: Not on file   Housing Stability: Not on file     Social History     Tobacco Use   Smoking Status  "Never   Smokeless Tobacco Never     Family History   Problem Relation Age of Onset    Nephrolithiasis Mother     Arthritis Mother     Hyperlipidemia Mother     Heart attack Father     Hypertension Father     Stroke Father     Sudden death Father     Hyperlipidemia Father     Heart disease Father     Coronary artery disease Maternal Grandfather     Alcohol abuse Neg Hx     Substance Abuse Neg Hx     Mental illness Neg Hx     Colon cancer Neg Hx     Colon polyps Neg Hx        The following portions of the patient's history were reviewed and updated as appropriate: allergies, current medications, past medical history, past social history, past surgical history and problem list.    Results  No results found for this or any previous visit (from the past hour).]  No results found for: \"PSA\"  Lab Results   Component Value Date    CALCIUM 9.0 09/30/2024    K 4.4 09/30/2024    CO2 28 09/30/2024     09/30/2024    BUN 10 09/30/2024    CREATININE 0.98 09/30/2024     Lab Results   Component Value Date    WBC 5.46 09/30/2024    HGB 14.4 09/30/2024    HCT 43.0 09/30/2024    MCV 87 09/30/2024     09/30/2024      "

## 2024-12-19 NOTE — ASSESSMENT & PLAN NOTE
Status post cystoscopy, left uteroscopy with laser lithotripsy, basket stone extraction, retrograde pyelogram, and left ureteral stent placement performed 8/28/2024 for treatment of a 4 mm left ureteral stone with mild left-sided hydronephrosis  Stone analysis returned as calcium oxalate  Ultrasound of the kidney and bladder performed 12/7/2024 noted no remaining stone burden.  I did however note an extrarenal pelvis on the right kidney as well as a mild extra renal pelvis and the left kidney.  We discussed dietary modifications and recommendations for further kidney stone prevention.  We discussed that with no further stones visualized on ultrasound, that now would be the best time to implement these changes.  Patient will follow-up with the office on an as-needed basis at this time

## 2025-05-08 ENCOUNTER — TELEPHONE (OUTPATIENT)
Age: 45
End: 2025-05-08

## 2025-05-08 NOTE — TELEPHONE ENCOUNTER
Pt calling to sched colonoscopy, would like consult first. OV sched. Pt would like to sched w/Dr. Zhou if he still has any availability, if not open to other docs as well. Would prefer a Mon.

## 2025-05-28 ENCOUNTER — OFFICE VISIT (OUTPATIENT)
Dept: GASTROENTEROLOGY | Facility: CLINIC | Age: 45
End: 2025-05-28
Payer: COMMERCIAL

## 2025-05-28 ENCOUNTER — TELEPHONE (OUTPATIENT)
Dept: GASTROENTEROLOGY | Facility: CLINIC | Age: 45
End: 2025-05-28

## 2025-05-28 VITALS
SYSTOLIC BLOOD PRESSURE: 140 MMHG | BODY MASS INDEX: 32.48 KG/M2 | HEIGHT: 71 IN | DIASTOLIC BLOOD PRESSURE: 89 MMHG | WEIGHT: 232 LBS

## 2025-05-28 DIAGNOSIS — K22.4 JACKHAMMER ESOPHAGUS: ICD-10-CM

## 2025-05-28 DIAGNOSIS — Z12.11 COLON CANCER SCREENING: Primary | ICD-10-CM

## 2025-05-28 DIAGNOSIS — K58.0 IRRITABLE BOWEL SYNDROME WITH DIARRHEA: ICD-10-CM

## 2025-05-28 DIAGNOSIS — K76.0 FATTY LIVER: ICD-10-CM

## 2025-05-28 DIAGNOSIS — K21.9 GASTROESOPHAGEAL REFLUX DISEASE WITHOUT ESOPHAGITIS: ICD-10-CM

## 2025-05-28 PROBLEM — R79.89 LFT ELEVATION: Status: RESOLVED | Noted: 2021-10-05 | Resolved: 2025-05-28

## 2025-05-28 PROCEDURE — 99244 OFF/OP CNSLTJ NEW/EST MOD 40: CPT | Performed by: PHYSICIAN ASSISTANT

## 2025-05-28 RX ORDER — SODIUM CHLORIDE, SODIUM LACTATE, POTASSIUM CHLORIDE, CALCIUM CHLORIDE 600; 310; 30; 20 MG/100ML; MG/100ML; MG/100ML; MG/100ML
125 INJECTION, SOLUTION INTRAVENOUS CONTINUOUS
Status: CANCELLED | OUTPATIENT
Start: 2025-05-28

## 2025-05-28 RX ORDER — POLYETHYLENE GLYCOL 3350, SODIUM SULFATE ANHYDROUS, SODIUM BICARBONATE, SODIUM CHLORIDE, POTASSIUM CHLORIDE 236; 22.74; 6.74; 5.86; 2.97 G/4L; G/4L; G/4L; G/4L; G/4L
4000 POWDER, FOR SOLUTION ORAL ONCE
Qty: 4000 ML | Refills: 0 | Status: SHIPPED | OUTPATIENT
Start: 2025-05-28 | End: 2025-06-02 | Stop reason: HOSPADM

## 2025-05-28 NOTE — PROGRESS NOTES
Name: Bennie Finn      : 1980      MRN: [id number]  Encounter Provider: Eloina Carvajal PA-C  Encounter Date: 2025   Encounter department: [facility]  :  Assessment & Plan  Colon cancer screening  I did recommend a colonoscopy for colorectal cancer screening. I reviewed the indications, risks, benefits and alternatives of a colonoscopy and the patient is willing to proceed.     Orders:    Colonoscopy; Future    polyethylene glycol (Golytely) 4000 mL solution; Take 4,000 mL by mouth once for 1 dose Take 4000 mL by mouth once for 1 dose. Use as directed    Irritable bowel syndrome with diarrhea  Has longstanding history of IBS-D managed with diet and Imodium as needed.  An antidiarrheal diet was reviewed.  Can have colon biopsies taken at time of colonoscopy to exclude underlying colitis     -cont Imodium prn  Gastroesophageal reflux disease without esophagitis  Reflux and chest pain resolved, patient off PPI.  An antireflux regimen and lifestyle modifications were reviewed.  Advised to call with recurrent issues.       Jackhammer esophagus  As above       Fatty liver  Pt with fatty liver.  LFTs normal.  Ultrasound elastography  S2, F0-F1, IQR 4%.  Offered to repeat now but patient declined. If not immune pt should have HAV and HBV vaccinations. The pathogenesis of fatty liver and potential progression to cirrhosis was reviewed with the patient.  Pt was advised to lose weight, avoid alcohol, live a liver healthy lifestyle and control any comorbidities.        Follow up: COLON      History of Present Illness   HPI  Bennie Finn is a 44 y.o. male with PMH kidney stones who presents at the kind request of Dr. Cyndi Rm PA-C for evaluation for colonoscopy for screening.  Reports normal colonoscopy 8 to 10 years ago results unavailable.  No significant family history of GI malignancy.    2019 EGD for chest pain showed erosive gastritis and duodenitis,  "Sb bx + IEL, gastric bx neg. celiac panel neg    Had chest pain esophageal manometry 2020 consistent with jackhammer esophagus improved with PPI.    Saw Dr. Zhou 10/2021 with history of IBS-D managed with diet failed amitriptyline reflux stable had some elevated LFTs for which labs and elastography ordered.  At that time viral hepatitis panel ASMA, JOSE CRUZ, iron panel, ferritin all negative and LFTs returned to normal    9/2024 CBC, TSH, CMP normal.    6/2021 abdominal ultrasound showed mild hepatic steatosis status postcholecystectomy without biliary dilation.    11/2021 ultrasound elastography S2, F0 - F1 with absent to mild fibrosis. IQR 4%    Reports formed stool with intermittent diarrhea up to 6 times a day without bleeding.  This is depending on diet and managed with Imodium which he takes about 3 times a week.  Diarrhea predated cholecystectomy.  Eating well and weight stable.  No longer experiencing reflux or chest pain and has been off PPI.  Denies NSAID use.  No alcohol.  Otherwise denies all other GI and constitutional symptoms.      Review of Systems  Constitutional: denies fatigue, fever.  HEENT: denies visual disturbance, postnasal drip, sore throat.  Respiratory: denies cough, shortness of breath.  Cardiovascular: denies chest pain, leg swelling.  Gastrointestinal: as noted above in HPI.  : denies difficulty urinating, dysuria.  Musculoskeletal: denies arthralgias, back pain.  Neurological: denies dizziness, syncope.  Psychiatric: denies confusion, anxiety.       Objective   /89   Ht 5' 11\" (1.803 m)   Wt 105 kg (232 lb)   BMI 32.36 kg/m²      Physical Exam  Constitutional: Well-developed, no acute distress  HEENT: normocephalic, mucous membranes moist.  Neck: Supple  Skin: warm and dry  Respiratory: Lungs are clear to auscultation B/L.  Cardiovascular: Heart is regular rate and rhythm.  Gastrointestinal: obese, Soft, nontender, nondistended with normal active bowel sounds.  No masses, " guarding, rebound.   Rectal Exam: Deferred.  Extremities: No edema.  Neurologic: Nonfocal. A & O ×3.   Psychiatric: Normal affect.

## 2025-05-28 NOTE — H&P (VIEW-ONLY)
Name: Bennie Finn      : 1980      MRN: [id number]  Encounter Provider: Eloina Carvajal PA-C  Encounter Date: 2025   Encounter department: [facility]  :  Assessment & Plan  Colon cancer screening  I did recommend a colonoscopy for colorectal cancer screening. I reviewed the indications, risks, benefits and alternatives of a colonoscopy and the patient is willing to proceed.     Orders:    Colonoscopy; Future    polyethylene glycol (Golytely) 4000 mL solution; Take 4,000 mL by mouth once for 1 dose Take 4000 mL by mouth once for 1 dose. Use as directed    Irritable bowel syndrome with diarrhea  Has longstanding history of IBS-D managed with diet and Imodium as needed.  An antidiarrheal diet was reviewed.  Can have colon biopsies taken at time of colonoscopy to exclude underlying colitis     -cont Imodium prn  Gastroesophageal reflux disease without esophagitis  Reflux and chest pain resolved, patient off PPI.  An antireflux regimen and lifestyle modifications were reviewed.  Advised to call with recurrent issues.       Jackhammer esophagus  As above       Fatty liver  Pt with fatty liver.  LFTs normal.  Ultrasound elastography  S2, F0-F1, IQR 4%.  Offered to repeat now but patient declined. If not immune pt should have HAV and HBV vaccinations. The pathogenesis of fatty liver and potential progression to cirrhosis was reviewed with the patient.  Pt was advised to lose weight, avoid alcohol, live a liver healthy lifestyle and control any comorbidities.        Follow up: COLON      History of Present Illness   HPI  Bennie Finn is a 44 y.o. male with PMH kidney stones who presents at the kind request of Dr. Cyndi Rm PA-C for evaluation for colonoscopy for screening.  Reports normal colonoscopy 8 to 10 years ago results unavailable.  No significant family history of GI malignancy.    2019 EGD for chest pain showed erosive gastritis and duodenitis,  "Sb bx + IEL, gastric bx neg. celiac panel neg    Had chest pain esophageal manometry 2020 consistent with jackhammer esophagus improved with PPI.    Saw Dr. Zhou 10/2021 with history of IBS-D managed with diet failed amitriptyline reflux stable had some elevated LFTs for which labs and elastography ordered.  At that time viral hepatitis panel ASMA, JOSE CRUZ, iron panel, ferritin all negative and LFTs returned to normal    9/2024 CBC, TSH, CMP normal.    6/2021 abdominal ultrasound showed mild hepatic steatosis status postcholecystectomy without biliary dilation.    11/2021 ultrasound elastography S2, F0 - F1 with absent to mild fibrosis. IQR 4%    Reports formed stool with intermittent diarrhea up to 6 times a day without bleeding.  This is depending on diet and managed with Imodium which he takes about 3 times a week.  Diarrhea predated cholecystectomy.  Eating well and weight stable.  No longer experiencing reflux or chest pain and has been off PPI.  Denies NSAID use.  No alcohol.  Otherwise denies all other GI and constitutional symptoms.      Review of Systems  Constitutional: denies fatigue, fever.  HEENT: denies visual disturbance, postnasal drip, sore throat.  Respiratory: denies cough, shortness of breath.  Cardiovascular: denies chest pain, leg swelling.  Gastrointestinal: as noted above in HPI.  : denies difficulty urinating, dysuria.  Musculoskeletal: denies arthralgias, back pain.  Neurological: denies dizziness, syncope.  Psychiatric: denies confusion, anxiety.       Objective   /89   Ht 5' 11\" (1.803 m)   Wt 105 kg (232 lb)   BMI 32.36 kg/m²      Physical Exam  Constitutional: Well-developed, no acute distress  HEENT: normocephalic, mucous membranes moist.  Neck: Supple  Skin: warm and dry  Respiratory: Lungs are clear to auscultation B/L.  Cardiovascular: Heart is regular rate and rhythm.  Gastrointestinal: obese, Soft, nontender, nondistended with normal active bowel sounds.  No masses, " guarding, rebound.   Rectal Exam: Deferred.  Extremities: No edema.  Neurologic: Nonfocal. A & O ×3.   Psychiatric: Normal affect.

## 2025-05-28 NOTE — PATIENT INSTRUCTIONS
-colonoscopy with biopsies  -antidiarrheal diet  -fatty liver precautions  -follow up pending scope results

## 2025-05-28 NOTE — ASSESSMENT & PLAN NOTE
Reflux and chest pain resolved, patient off PPI.  An antireflux regimen and lifestyle modifications were reviewed.  Advised to call with recurrent issues.

## 2025-05-28 NOTE — ASSESSMENT & PLAN NOTE
Has longstanding history of IBS-D managed with diet and Imodium as needed.  An antidiarrheal diet was reviewed.  Can have colon biopsies taken at time of colonoscopy to exclude underlying colitis     -cont Imodium prn

## 2025-05-28 NOTE — TELEPHONE ENCOUNTER
Scheduled date of colonoscopy (as of today):6/2/2025  Physician performing colonoscopy:Dr Gamez  Location of colonoscopy:[place]  Bowel prep reviewed with patient:Zheng  Instructions reviewed with patient by:Dianna  Clearances: N/A

## 2025-06-02 ENCOUNTER — ANESTHESIA (OUTPATIENT)
Dept: GASTROENTEROLOGY | Facility: HOSPITAL | Age: 45
End: 2025-06-02
Payer: COMMERCIAL

## 2025-06-02 ENCOUNTER — ANESTHESIA EVENT (OUTPATIENT)
Dept: GASTROENTEROLOGY | Facility: HOSPITAL | Age: 45
End: 2025-06-02
Payer: COMMERCIAL

## 2025-06-02 ENCOUNTER — HOSPITAL ENCOUNTER (OUTPATIENT)
Dept: GASTROENTEROLOGY | Facility: HOSPITAL | Age: 45
Setting detail: OUTPATIENT SURGERY
Discharge: HOME | End: 2025-06-02
Attending: PHYSICIAN ASSISTANT
Payer: COMMERCIAL

## 2025-06-02 VITALS
DIASTOLIC BLOOD PRESSURE: 78 MMHG | TEMPERATURE: 97.8 F | RESPIRATION RATE: 15 BRPM | OXYGEN SATURATION: 96 % | HEART RATE: 93 BPM | SYSTOLIC BLOOD PRESSURE: 111 MMHG

## 2025-06-02 DIAGNOSIS — Z12.11 COLON CANCER SCREENING: ICD-10-CM

## 2025-06-02 PROCEDURE — 45380 COLONOSCOPY AND BIOPSY: CPT | Performed by: INTERNAL MEDICINE

## 2025-06-02 PROCEDURE — 88305 TISSUE EXAM BY PATHOLOGIST: CPT | Performed by: PATHOLOGY

## 2025-06-02 RX ORDER — PROPOFOL 10 MG/ML
INJECTION, EMULSION INTRAVENOUS AS NEEDED
Status: DISCONTINUED | OUTPATIENT
Start: 2025-06-02 | End: 2025-06-02

## 2025-06-02 RX ORDER — LIDOCAINE HYDROCHLORIDE 10 MG/ML
INJECTION, SOLUTION EPIDURAL; INFILTRATION; INTRACAUDAL; PERINEURAL AS NEEDED
Status: DISCONTINUED | OUTPATIENT
Start: 2025-06-02 | End: 2025-06-02

## 2025-06-02 RX ORDER — SODIUM CHLORIDE, SODIUM LACTATE, POTASSIUM CHLORIDE, CALCIUM CHLORIDE 600; 310; 30; 20 MG/100ML; MG/100ML; MG/100ML; MG/100ML
125 INJECTION, SOLUTION INTRAVENOUS CONTINUOUS
Status: DISCONTINUED | OUTPATIENT
Start: 2025-06-02 | End: 2025-06-06 | Stop reason: HOSPADM

## 2025-06-02 RX ORDER — SODIUM CHLORIDE 9 MG/ML
INJECTION, SOLUTION INTRAVENOUS CONTINUOUS PRN
Status: DISCONTINUED | OUTPATIENT
Start: 2025-06-02 | End: 2025-06-02

## 2025-06-02 RX ADMIN — PROPOFOL 50 MG: 10 INJECTION, EMULSION INTRAVENOUS at 11:00

## 2025-06-02 RX ADMIN — PROPOFOL 50 MG: 10 INJECTION, EMULSION INTRAVENOUS at 10:56

## 2025-06-02 RX ADMIN — PROPOFOL 50 MG: 10 INJECTION, EMULSION INTRAVENOUS at 10:52

## 2025-06-02 RX ADMIN — LIDOCAINE HYDROCHLORIDE 50 MG: 10 INJECTION, SOLUTION EPIDURAL; INFILTRATION; INTRACAUDAL at 10:50

## 2025-06-02 RX ADMIN — SODIUM CHLORIDE: 0.9 INJECTION, SOLUTION INTRAVENOUS at 10:29

## 2025-06-02 RX ADMIN — PROPOFOL 100 MG: 10 INJECTION, EMULSION INTRAVENOUS at 10:51

## 2025-06-02 NOTE — ANESTHESIA POSTPROCEDURE EVALUATION
Post-Op Assessment Note    CV Status:  Stable    Pain management: adequate       Mental Status:  Alert and awake   Hydration Status:  Euvolemic   PONV Controlled:  Controlled   Airway Patency:  Patent     Post Op Vitals Reviewed: Yes    No anethesia notable event occurred.    Staff: Anesthesiologist, with CRNAs           Last Filed PACU Vitals:  Vitals Value Taken Time   Temp     Pulse 78 06/02/25 11:07   /78 06/02/25 11:07   Resp 17 06/02/25 11:07   SpO2 96 % 06/02/25 11:07

## 2025-06-02 NOTE — INTERVAL H&P NOTE
H&P reviewed. After examining the patient I find no changes in the patients condition since the H&P had been written.    Vitals:    06/02/25 1022   BP: 141/85   Pulse: 92   Resp: 18   Temp: 97.8 °F (36.6 °C)   SpO2: 95%

## 2025-06-02 NOTE — ANESTHESIA PREPROCEDURE EVALUATION
Procedure:  COLONOSCOPY    Relevant Problems   GI/HEPATIC   (+) Gastroesophageal reflux disease without esophagitis        Physical Exam    Airway     Mallampati score: II  TM Distance: >3 FB  Neck ROM: full  Mouth opening: >= 4 cm      Cardiovascular  Rhythm: regular, Rate: normal, Pulse is strong.     Dental   No notable dental hx     Pulmonary   Breath sounds clear to auscultation    Neurological    He appears alert and oriented x3.      Other Findings              Past Medical History[1]  Lab Results   Component Value Date    WBC 5.46 09/30/2024    HGB 14.4 09/30/2024    HCT 43.0 09/30/2024    MCV 87 09/30/2024     09/30/2024         Anesthesia Plan  ASA Score- 2     Anesthesia Type- IV sedation with anesthesia with ASA Monitors.         Additional Monitors:     Airway Plan:     Comment: NPO appropriate. Discussed benefits/risks of monitored anesthetic care which involves providing a dynamic level of mild to deep sedation. Complications include awareness and/or airway obstruction/aspiration which may necessitate conversion to general anesthesia. All questions answered. Patient understands and wishes to proceed. .       Plan Factors-Exercise tolerance (METS): >4 METS.    Chart reviewed. EKG reviewed.  Existing labs reviewed.                   Induction-     Postoperative Plan- Plan for postoperative opioid use.   Monitoring Plan -   Post Operative Pain Plan - plan for postoperative opioid use        Informed Consent- Anesthetic plan and risks discussed with patient.  I personally reviewed this patient with the CRNA. Discussed and agreed on the Anesthesia Plan with the CRNA..      NPO Status:  No vitals data found for the desired time range.             [1]   Past Medical History:  Diagnosis Date    Cholelithiasis     GERD (gastroesophageal reflux disease)     Herniation of intervertebral disc of lumbar spine     IBS (irritable bowel syndrome)

## 2025-06-04 ENCOUNTER — RESULTS FOLLOW-UP (OUTPATIENT)
Dept: GASTROENTEROLOGY | Facility: CLINIC | Age: 45
End: 2025-06-04

## 2025-06-04 PROCEDURE — 88305 TISSUE EXAM BY PATHOLOGIST: CPT | Performed by: PATHOLOGY

## 2025-06-26 ENCOUNTER — OFFICE VISIT (OUTPATIENT)
Dept: FAMILY MEDICINE CLINIC | Facility: CLINIC | Age: 45
End: 2025-06-26
Payer: COMMERCIAL

## 2025-06-26 VITALS
OXYGEN SATURATION: 97 % | BODY MASS INDEX: 32.11 KG/M2 | SYSTOLIC BLOOD PRESSURE: 128 MMHG | HEART RATE: 90 BPM | HEIGHT: 71 IN | RESPIRATION RATE: 14 BRPM | DIASTOLIC BLOOD PRESSURE: 92 MMHG | WEIGHT: 229.4 LBS | TEMPERATURE: 98.2 F

## 2025-06-26 DIAGNOSIS — K13.79 MOUTH SORE: Primary | ICD-10-CM

## 2025-06-26 PROCEDURE — 99213 OFFICE O/P EST LOW 20 MIN: CPT | Performed by: NURSE PRACTITIONER

## 2025-06-26 NOTE — PROGRESS NOTES
"Name: Bennie Finn      : 1980      MRN: [id number]  Encounter Provider: JENNIFER Sherman  Encounter Date: 2025   Encounter department: [facility]  :  Assessment & Plan  Mouth sore       Area appears to be resolving w/ home use of Oral B rinse. Pt will continue this and follow bland diet until area fully heals. Fluids encouraged. Patient is encouraged to call our office for any questions/concerns, persistent or worsening symptoms. Patient states they understand and agree with treatment plan.       Pt to f/u PRN.         History of Present Illness   Pt presents today after noting last Tuesday he started with blister to roof of his mouth.  He then developed 2 white blisters that he could manually pop and drain.  They did refill w/ fluid several times.  Yesterday he started Oral b mouthwash w/ peroxide which he notes has helped.      Review of Systems  As noted per HPI.  Objective   /92   Pulse 90   Temp 98.2 °F (36.8 °C)   Resp 14   Ht 5' 11\" (1.803 m)   Wt 104 kg (229 lb 6.4 oz)   SpO2 97%   BMI 31.99 kg/m²      Physical Exam  Vitals reviewed.   Constitutional:       Appearance: Normal appearance.   HENT:      Mouth/Throat:     Pulmonary:      Effort: Pulmonary effort is normal.     Neurological:      Mental Status: He is alert and oriented to person, place, and time. Mental status is at baseline.     Psychiatric:         Mood and Affect: Mood normal.         Behavior: Behavior normal.         Thought Content: Thought content normal.         Judgment: Judgment normal.         "

## (undated) DEVICE — SUT VICRYL 0 UR-6 27 IN J603H

## (undated) DEVICE — GLOVE SRG BIOGEL 6.5

## (undated) DEVICE — CATH URETERAL 5FR X 70 CM FLEX TIP POLYUR BARD

## (undated) DEVICE — SPECIMEN CONTAINER STERILE PEEL PACK

## (undated) DEVICE — SUT STRATAFIX SPIRAL PDS PLUS 2-0 CT-2 23CM SXPP1B432

## (undated) DEVICE — INTENDED FOR TISSUE SEPARATION, AND OTHER PROCEDURES THAT REQUIRE A SHARP SURGICAL BLADE TO PUNCTURE OR CUT.: Brand: BARD-PARKER SAFETY BLADES SIZE 11, STERILE

## (undated) DEVICE — GUIDEWIRE STRGHT TIP 0.035 IN  SOLO PLUS

## (undated) DEVICE — MEGA NEEDLE DRIVER: Brand: ENDOWRIST;DAVINCI SI

## (undated) DEVICE — ADHESIVE SKIN HIGH VISCOSITY EXOFIN 1ML

## (undated) DEVICE — PROGRASP FORCEPS: Brand: ENDOWRIST

## (undated) DEVICE — IRRIG ENDO FLO TUBING

## (undated) DEVICE — PACK TUR

## (undated) DEVICE — MONOPOLAR CURVED SCISSORS: Brand: ENDOWRIST

## (undated) DEVICE — GLOVE INDICATOR PI UNDERGLOVE SZ 8 BLUE

## (undated) DEVICE — ASTOUND STANDARD SURGICAL GOWN, XL: Brand: CONVERTORS

## (undated) DEVICE — BLUE HEAT SCOPE WARMER

## (undated) DEVICE — BLADELESS OBTURATOR: Brand: WECK VISTA

## (undated) DEVICE — SEAL

## (undated) DEVICE — HEAVY DUTY TABLE COVER: Brand: CONVERTORS

## (undated) DEVICE — SUT VICRYL 2-0 SH 27 IN UNDYED J417H

## (undated) DEVICE — ALLENTOWN LAP CHOLE APP PACK: Brand: CARDINAL HEALTH

## (undated) DEVICE — PMI DISPOSABLE PUNCTURE CLOSURE DEVICE / SUTURE GRASPER: Brand: PMI

## (undated) DEVICE — GLOVE SRG BIOGEL 7

## (undated) DEVICE — SUT MONOCRYL 4-0 PS-2 27 IN Y426H

## (undated) DEVICE — VISUALIZATION SYSTEM: Brand: CLEARIFY

## (undated) DEVICE — ARM DRAPE

## (undated) DEVICE — DECANTER: Brand: UNBRANDED

## (undated) DEVICE — GLOVE SRG BIOGEL ECLIPSE 7

## (undated) DEVICE — PACK PBDS LAP CHOLE RF

## (undated) DEVICE — TIP COVER ACCESSORY

## (undated) DEVICE — SCD SEQUENTIAL COMPRESSION COMFORT SLEEVE MEDIUM KNEE LENGTH: Brand: KENDALL SCD

## (undated) DEVICE — UNDYED BRAIDED (POLYGLACTIN 910), SYNTHETIC ABSORBABLE SUTURE: Brand: COATED VICRYL

## (undated) DEVICE — REDUCER: Brand: ENDOWRIST

## (undated) DEVICE — BASKET SPECIMEN RETRIVAL 1.9FR 120CM

## (undated) DEVICE — NEEDLE 23G X 1 1/2 SAFETY-GLIDE THIN WALL

## (undated) DEVICE — ENDOPATH XCEL BLADELESS TROCARS WITH STABILITY SLEEVES: Brand: ENDOPATH XCEL

## (undated) DEVICE — MAYO STAND COVER: Brand: CONVERTORS

## (undated) DEVICE — ADHESIVE SKN CLSR HISTOACRYL FLEX 0.5ML LF

## (undated) DEVICE — REM POLYHESIVE ADULT PATIENT RETURN ELECTRODE: Brand: VALLEYLAB

## (undated) DEVICE — CHLORAPREP HI-LITE 26ML ORANGE

## (undated) DEVICE — SYRINGE 10ML LL

## (undated) DEVICE — COLUMN DRAPE

## (undated) DEVICE — GLOVE INDICATOR PI UNDERGLOVE SZ 7 BLUE

## (undated) DEVICE — AEM CORD

## (undated) DEVICE — ENDOPOUCH RETRIEVER SPECIMEN RETRIEVAL BAGS: Brand: ENDOPOUCH RETRIEVER

## (undated) DEVICE — [HIGH FLOW INSUFFLATOR,  DO NOT USE IF PACKAGE IS DAMAGED,  KEEP DRY,  KEEP AWAY FROM SUNLIGHT,  PROTECT FROM HEAT AND RADIOACTIVE SOURCES.]: Brand: PNEUMOSURE

## (undated) DEVICE — GLOVE SRG BIOGEL ECLIPSE 7.5

## (undated) DEVICE — CANNULA SEAL

## (undated) DEVICE — LAP AEM SCISSOR TIP .75 IN

## (undated) DEVICE — 3000CC GUARDIAN II: Brand: GUARDIAN

## (undated) DEVICE — LIGAMAX 5 MM ENDOSCOPIC MULTIPLE CLIP APPLIER: Brand: LIGAMAX

## (undated) DEVICE — INSUFLATION TUBING INSUFLOW (LEXION)

## (undated) DEVICE — ENDOPATH XCEL UNIVERSAL TROCAR STABLILITY SLEEVES: Brand: ENDOPATH XCEL

## (undated) DEVICE — DRAPE SHEET THREE QUARTER